# Patient Record
Sex: MALE | Race: WHITE | Employment: OTHER | URBAN - METROPOLITAN AREA
[De-identification: names, ages, dates, MRNs, and addresses within clinical notes are randomized per-mention and may not be internally consistent; named-entity substitution may affect disease eponyms.]

---

## 2017-06-29 PROBLEM — I72.3 ILIAC ARTERY ANEURYSM, BILATERAL (HCC): Status: ACTIVE | Noted: 2017-06-29

## 2017-07-27 PROBLEM — Z87.891 HISTORY OF TOBACCO USE: Status: ACTIVE | Noted: 2017-07-27

## 2019-06-25 ENCOUNTER — HOSPITAL ENCOUNTER (OUTPATIENT)
Age: 74
Discharge: HOME | End: 2019-06-25
Payer: MEDICARE

## 2019-06-25 LAB
ALBUMIN SERPL-MCNC: 4.1 G/DL (ref 3.5–5.2)
ALP BLD-CCNC: 73 U/L (ref 40–129)
ALT SERPL-CCNC: 20 U/L (ref 0–40)
ANION GAP SERPL CALCULATED.3IONS-SCNC: 12 MMOL/L (ref 7–16)
AST SERPL-CCNC: 25 U/L (ref 0–39)
BASOPHILS ABSOLUTE: 0.07 E9/L (ref 0–0.2)
BASOPHILS RELATIVE PERCENT: 0.8 % (ref 0–2)
BILIRUB SERPL-MCNC: 0.3 MG/DL (ref 0–1.2)
BUN BLDV-MCNC: 37 MG/DL (ref 8–23)
CALCIUM SERPL-MCNC: 9 MG/DL (ref 8.6–10.2)
CHLORIDE BLD-SCNC: 107 MMOL/L (ref 98–107)
CHOLESTEROL, FASTING: 167 MG/DL (ref 0–199)
CO2: 25 MMOL/L (ref 22–29)
CREAT SERPL-MCNC: 2.4 MG/DL (ref 0.7–1.2)
EOSINOPHILS ABSOLUTE: 0.24 E9/L (ref 0.05–0.5)
EOSINOPHILS RELATIVE PERCENT: 2.7 % (ref 0–6)
GFR AFRICAN AMERICAN: 32
GFR NON-AFRICAN AMERICAN: 26 ML/MIN/1.73
GLUCOSE FASTING: 104 MG/DL (ref 74–99)
HCT VFR BLD CALC: 44.1 % (ref 37–54)
HDLC SERPL-MCNC: 45 MG/DL
HEMOGLOBIN: 14.1 G/DL (ref 12.5–16.5)
IMMATURE GRANULOCYTES #: 0.04 E9/L
IMMATURE GRANULOCYTES %: 0.5 % (ref 0–5)
LDL CHOLESTEROL CALCULATED: 109 MG/DL (ref 0–99)
LYMPHOCYTES ABSOLUTE: 1.92 E9/L (ref 1.5–4)
LYMPHOCYTES RELATIVE PERCENT: 21.9 % (ref 20–42)
MCH RBC QN AUTO: 30.8 PG (ref 26–35)
MCHC RBC AUTO-ENTMCNC: 32 % (ref 32–34.5)
MCV RBC AUTO: 96.3 FL (ref 80–99.9)
MONOCYTES ABSOLUTE: 0.86 E9/L (ref 0.1–0.95)
MONOCYTES RELATIVE PERCENT: 9.8 % (ref 2–12)
NEUTROPHILS ABSOLUTE: 5.62 E9/L (ref 1.8–7.3)
NEUTROPHILS RELATIVE PERCENT: 64.3 % (ref 43–80)
PDW BLD-RTO: 14.6 FL (ref 11.5–15)
PLATELET # BLD: 197 E9/L (ref 130–450)
PMV BLD AUTO: 10.9 FL (ref 7–12)
POTASSIUM SERPL-SCNC: 5.1 MMOL/L (ref 3.5–5)
PROSTATE SPECIFIC ANTIGEN: 2.37 NG/ML (ref 0–4)
RBC # BLD: 4.58 E12/L (ref 3.8–5.8)
SODIUM BLD-SCNC: 144 MMOL/L (ref 132–146)
TOTAL PROTEIN: 7.1 G/DL (ref 6.4–8.3)
TRIGLYCERIDE, FASTING: 67 MG/DL (ref 0–149)
VLDLC SERPL CALC-MCNC: 13 MG/DL
WBC # BLD: 8.8 E9/L (ref 4.5–11.5)

## 2019-06-25 PROCEDURE — 83695 ASSAY OF LIPOPROTEIN(A): CPT

## 2019-06-25 PROCEDURE — 80061 LIPID PANEL: CPT

## 2019-06-25 PROCEDURE — 85025 COMPLETE CBC W/AUTO DIFF WBC: CPT

## 2019-06-25 PROCEDURE — 80053 COMPREHEN METABOLIC PANEL: CPT

## 2019-06-25 PROCEDURE — G0103 PSA SCREENING: HCPCS

## 2019-06-25 PROCEDURE — 36415 COLL VENOUS BLD VENIPUNCTURE: CPT

## 2019-06-27 LAB — LIPOPROTEIN (A): 20 MG/DL

## 2020-03-13 ENCOUNTER — HOSPITAL ENCOUNTER (OUTPATIENT)
Age: 75
Discharge: HOME | End: 2020-03-13
Payer: MEDICARE

## 2020-03-13 LAB
ALBUMIN SERPL-MCNC: 3.9 G/DL (ref 3.5–5.2)
ALP BLD-CCNC: 53 U/L (ref 40–129)
ALT SERPL-CCNC: 12 U/L (ref 0–40)
ANION GAP SERPL CALCULATED.3IONS-SCNC: 12 MMOL/L (ref 7–16)
AST SERPL-CCNC: 19 U/L (ref 0–39)
BACTERIA: ABNORMAL /HPF
BILIRUB SERPL-MCNC: 0.3 MG/DL (ref 0–1.2)
BILIRUBIN URINE: NEGATIVE
BLOOD, URINE: ABNORMAL
BUN BLDV-MCNC: 47 MG/DL (ref 8–23)
CALCIUM SERPL-MCNC: 8.7 MG/DL (ref 8.6–10.2)
CHLORIDE BLD-SCNC: 106 MMOL/L (ref 98–107)
CHOLESTEROL, FASTING: 167 MG/DL (ref 0–199)
CLARITY: CLEAR
CO2: 24 MMOL/L (ref 22–29)
COLOR: YELLOW
CREAT SERPL-MCNC: 2.8 MG/DL (ref 0.7–1.2)
CREATININE URINE: 95 MG/DL (ref 40–278)
GFR AFRICAN AMERICAN: 27
GFR NON-AFRICAN AMERICAN: 22 ML/MIN/1.73
GLUCOSE BLD-MCNC: 96 MG/DL (ref 74–99)
GLUCOSE URINE: NEGATIVE MG/DL
HBA1C MFR BLD: 5.6 % (ref 4–5.6)
HCT VFR BLD CALC: 40.1 % (ref 37–54)
HDLC SERPL-MCNC: 56 MG/DL
HEMOGLOBIN: 12.8 G/DL (ref 12.5–16.5)
KETONES, URINE: NEGATIVE MG/DL
LDL CHOLESTEROL CALCULATED: 100 MG/DL (ref 0–99)
LEUKOCYTE ESTERASE, URINE: NEGATIVE
MAGNESIUM: 2.5 MG/DL (ref 1.6–2.6)
MCH RBC QN AUTO: 31.3 PG (ref 26–35)
MCHC RBC AUTO-ENTMCNC: 31.9 % (ref 32–34.5)
MCV RBC AUTO: 98 FL (ref 80–99.9)
MICROALBUMIN UR-MCNC: 1410 MG/L
MICROALBUMIN/CREAT UR-RTO: 1484.2 (ref 0–30)
NITRITE, URINE: NEGATIVE
PARATHYROID HORMONE INTACT: 71 PG/ML (ref 15–65)
PDW BLD-RTO: 13.9 FL (ref 11.5–15)
PH UA: 5.5 (ref 5–9)
PHOSPHORUS: 5.1 MG/DL (ref 2.5–4.5)
PLATELET # BLD: 219 E9/L (ref 130–450)
PMV BLD AUTO: 11 FL (ref 7–12)
POTASSIUM SERPL-SCNC: 4.9 MMOL/L (ref 3.5–5)
PROSTATE SPECIFIC ANTIGEN: 2.42 NG/ML (ref 0–4)
PROTEIN UA: 100 MG/DL
RBC # BLD: 4.09 E12/L (ref 3.8–5.8)
RBC UA: ABNORMAL /HPF (ref 0–2)
SODIUM BLD-SCNC: 142 MMOL/L (ref 132–146)
SPECIFIC GRAVITY UA: >=1.03 (ref 1–1.03)
TOTAL PROTEIN: 6.3 G/DL (ref 6.4–8.3)
TRIGLYCERIDE, FASTING: 56 MG/DL (ref 0–149)
TSH SERPL DL<=0.05 MIU/L-ACNC: 3.22 UIU/ML (ref 0.27–4.2)
URIC ACID, SERUM: 4.1 MG/DL (ref 3.4–7)
UROBILINOGEN, URINE: 0.2 E.U./DL
VITAMIN D 25-HYDROXY: 35 NG/ML (ref 30–100)
VLDLC SERPL CALC-MCNC: 11 MG/DL
WBC # BLD: 7.7 E9/L (ref 4.5–11.5)
WBC UA: ABNORMAL /HPF (ref 0–5)

## 2020-03-13 PROCEDURE — 82570 ASSAY OF URINE CREATININE: CPT

## 2020-03-13 PROCEDURE — 83695 ASSAY OF LIPOPROTEIN(A): CPT

## 2020-03-13 PROCEDURE — 80053 COMPREHEN METABOLIC PANEL: CPT

## 2020-03-13 PROCEDURE — G0103 PSA SCREENING: HCPCS

## 2020-03-13 PROCEDURE — 84100 ASSAY OF PHOSPHORUS: CPT

## 2020-03-13 PROCEDURE — 80061 LIPID PANEL: CPT

## 2020-03-13 PROCEDURE — 84443 ASSAY THYROID STIM HORMONE: CPT

## 2020-03-13 PROCEDURE — 83735 ASSAY OF MAGNESIUM: CPT

## 2020-03-13 PROCEDURE — 83036 HEMOGLOBIN GLYCOSYLATED A1C: CPT

## 2020-03-13 PROCEDURE — 82306 VITAMIN D 25 HYDROXY: CPT

## 2020-03-13 PROCEDURE — 82044 UR ALBUMIN SEMIQUANTITATIVE: CPT

## 2020-03-13 PROCEDURE — 81001 URINALYSIS AUTO W/SCOPE: CPT

## 2020-03-13 PROCEDURE — 84550 ASSAY OF BLOOD/URIC ACID: CPT

## 2020-03-13 PROCEDURE — 85027 COMPLETE CBC AUTOMATED: CPT

## 2020-03-13 PROCEDURE — 36415 COLL VENOUS BLD VENIPUNCTURE: CPT

## 2020-03-13 PROCEDURE — 83970 ASSAY OF PARATHORMONE: CPT

## 2020-03-15 LAB — LIPOPROTEIN (A): 28 MG/DL

## 2021-05-11 ENCOUNTER — HOSPITAL ENCOUNTER (OUTPATIENT)
Age: 76
Discharge: HOME | End: 2021-05-11
Payer: MEDICARE

## 2021-05-11 LAB
ALBUMIN SERPL-MCNC: 3.9 G/DL (ref 3.5–5.2)
ALP BLD-CCNC: 56 U/L (ref 40–129)
ALT SERPL-CCNC: 14 U/L (ref 0–40)
ANION GAP SERPL CALCULATED.3IONS-SCNC: 13 MMOL/L (ref 7–16)
AST SERPL-CCNC: 21 U/L (ref 0–39)
BILIRUB SERPL-MCNC: 0.3 MG/DL (ref 0–1.2)
BUN BLDV-MCNC: 62 MG/DL (ref 6–23)
CALCIUM SERPL-MCNC: 8.5 MG/DL (ref 8.6–10.2)
CHLORIDE BLD-SCNC: 106 MMOL/L (ref 98–107)
CHOLESTEROL, FASTING: 183 MG/DL (ref 0–199)
CO2: 23 MMOL/L (ref 22–29)
CREAT SERPL-MCNC: 3.1 MG/DL (ref 0.7–1.2)
GFR AFRICAN AMERICAN: 24
GFR NON-AFRICAN AMERICAN: 20 ML/MIN/1.73
GLUCOSE BLD-MCNC: 95 MG/DL (ref 74–99)
HCT VFR BLD CALC: 39.3 % (ref 37–54)
HDLC SERPL-MCNC: 58 MG/DL
HEMOGLOBIN: 12.3 G/DL (ref 12.5–16.5)
LDL CHOLESTEROL CALCULATED: 111 MG/DL (ref 0–99)
MCH RBC QN AUTO: 30.4 PG (ref 26–35)
MCHC RBC AUTO-ENTMCNC: 31.3 % (ref 32–34.5)
MCV RBC AUTO: 97.3 FL (ref 80–99.9)
PDW BLD-RTO: 13.5 FL (ref 11.5–15)
PLATELET # BLD: 231 E9/L (ref 130–450)
PMV BLD AUTO: 9.8 FL (ref 7–12)
POTASSIUM SERPL-SCNC: 5.2 MMOL/L (ref 3.5–5)
PROSTATE SPECIFIC ANTIGEN: 2.68 NG/ML (ref 0–4)
RBC # BLD: 4.04 E12/L (ref 3.8–5.8)
SODIUM BLD-SCNC: 142 MMOL/L (ref 132–146)
TOTAL PROTEIN: 6.7 G/DL (ref 6.4–8.3)
TRIGLYCERIDE, FASTING: 72 MG/DL (ref 0–149)
VLDLC SERPL CALC-MCNC: 14 MG/DL
WBC # BLD: 9 E9/L (ref 4.5–11.5)

## 2021-05-11 PROCEDURE — 80053 COMPREHEN METABOLIC PANEL: CPT

## 2021-05-11 PROCEDURE — 85027 COMPLETE CBC AUTOMATED: CPT

## 2021-05-11 PROCEDURE — 36415 COLL VENOUS BLD VENIPUNCTURE: CPT

## 2021-05-11 PROCEDURE — 80061 LIPID PANEL: CPT

## 2021-05-11 PROCEDURE — 84153 ASSAY OF PSA TOTAL: CPT

## 2021-05-11 PROCEDURE — 83695 ASSAY OF LIPOPROTEIN(A): CPT

## 2021-05-14 LAB — LIPOPROTEIN (A): 27 MG/DL

## 2022-02-10 ENCOUNTER — HOSPITAL ENCOUNTER (OUTPATIENT)
Dept: ULTRASOUND IMAGING | Age: 77
Discharge: HOME | End: 2022-02-12
Payer: MEDICARE

## 2022-02-10 DIAGNOSIS — E78.2 MIXED HYPERLIPIDEMIA: ICD-10-CM

## 2022-02-10 DIAGNOSIS — N18.4 CHRONIC KIDNEY DISEASE (CKD), STAGE IV (SEVERE) (HCC): ICD-10-CM

## 2022-02-10 DIAGNOSIS — I10 ESSENTIAL HYPERTENSION: ICD-10-CM

## 2022-02-10 DIAGNOSIS — I48.0 PAROXYSMAL ATRIAL FIBRILLATION (HCC): ICD-10-CM

## 2022-02-10 PROCEDURE — 76770 US EXAM ABDO BACK WALL COMP: CPT

## 2022-02-10 PROCEDURE — 93975 VASCULAR STUDY: CPT

## 2022-02-16 ENCOUNTER — NURSE ONLY (OUTPATIENT)
Dept: CARDIOLOGY CLINIC | Age: 77
End: 2022-02-16

## 2022-02-16 ENCOUNTER — OFFICE VISIT (OUTPATIENT)
Dept: CARDIOLOGY CLINIC | Age: 77
End: 2022-02-16
Payer: MEDICARE

## 2022-02-16 VITALS
BODY MASS INDEX: 25.18 KG/M2 | OXYGEN SATURATION: 98 % | WEIGHT: 170 LBS | RESPIRATION RATE: 20 BRPM | HEART RATE: 44 BPM | DIASTOLIC BLOOD PRESSURE: 64 MMHG | HEIGHT: 69 IN | SYSTOLIC BLOOD PRESSURE: 150 MMHG

## 2022-02-16 DIAGNOSIS — R00.1 BRADYCARDIA: Primary | ICD-10-CM

## 2022-02-16 PROCEDURE — 99203 OFFICE O/P NEW LOW 30 MIN: CPT | Performed by: INTERNAL MEDICINE

## 2022-02-16 PROCEDURE — 93000 ELECTROCARDIOGRAM COMPLETE: CPT | Performed by: INTERNAL MEDICINE

## 2022-02-16 RX ORDER — TAMSULOSIN HYDROCHLORIDE 0.4 MG/1
0.4 CAPSULE ORAL DAILY
COMMUNITY

## 2022-02-16 ASSESSMENT — ENCOUNTER SYMPTOMS
BACK PAIN: 0
VOMITING: 0
COUGH: 0
BLOOD IN STOOL: 0
CONSTIPATION: 0
SHORTNESS OF BREATH: 0
NAUSEA: 0
DIARRHEA: 0
ABDOMINAL PAIN: 0
WHEEZING: 0

## 2022-02-16 NOTE — PROGRESS NOTES
OUTPATIENT CARDIOLOGY CONSULT    Name: Rica Hogan    Age: 64 y.o. Date of Service: 2/16/2022    Reason for Consultation:   Chief Complaint   Patient presents with    New Patient     Referral of Dr. Willis Malloy for bradycardia. Per wife, has had longstanding bradycardia and has always been asymptomatic. Referring Physician: Freddie Haddad MD    History of Present Illness:  78-year-old ex-smoker male who is referred for cardiac evaluation due to bradycardia. Patient is accompanied by his wife. He has longstanding history of bradycardia, hypertension, gout, peripheral arterial disease, paroxysmal atrial fibrillation during anesthesia prior to abdominal aortic aneurysm stenting at [facility] in 2017, status post bilateral bypass surgery to the lower extremity, syncopal event 3 years ago attributed to dehydration, chronic kidney disease and DVT. Patient is fairly active. He exercising at the  once or twice a week. He walks 2 to 3 miles on treadmill and bicycle and lifting. He denies chest discomfort or dyspnea on exertion. He denies palpitations, dizziness or syncope. He denies orthopnea or PND but admits to have chronic lower extremity edema. EKG done today revealed probable atrial fibrillation with slow ventricular response at 44 bpm, low voltage in frontal leads with nonspecific T wave changes, artifacts and prolonged QTC at 482 ms. Review of Systems:  Review of Systems   Constitutional: Negative for fatigue and fever. HENT: Negative for nosebleeds. Respiratory: Negative for cough, shortness of breath and wheezing. Cardiovascular: Positive for leg swelling. Gastrointestinal: Negative for abdominal pain, blood in stool, constipation, diarrhea, nausea and vomiting. Genitourinary: Negative for dysuria and hematuria. Musculoskeletal: Negative for back pain, joint swelling and myalgias. Neurological: Negative for syncope and light-headedness.    Psychiatric/Behavioral: The patient is not nervous/anxious. Past Medical History:  Past Medical History:   Diagnosis Date    Aneurysm of abdominal aorta ([facility] [place] [address] 2012    Deep vein thrombosis of calf (HCC)     R calf vein thrombosis involving post tibial vein with extension in close proximity  to popliteal vein    Gout     History of DVT (deep vein thrombosis) 3/12/2015    History of tobacco use 2017    Hypertension     Iliac artery aneurysm, bilateral ([facility] [place] [address] 2017    Loss of weight     diet and exercise    [email or web address] 3/12/2015       Past Surgical History:  Past Surgical History:   Procedure Laterality Date    ABDOMINAL AORTIC ANEURYSM REPAIR      2018    HERNIA REPAIR      OPEN REPAIR PERIPHERAL ANEURYSM Bilateral 2018    TONSILLECTOMY         Family History:  No family history on file. Social History:  Social History     Socioeconomic History    Marital status:      Spouse name: Not on file    Number of children: Not on file    Years of education: Not on file    Highest education level: Not on file   Occupational History    Not on file   Tobacco Use    Smoking status: Former Smoker     Packs/day: 2.00     Quit date: 3/12/1995     Years since quittin.9    Smokeless tobacco: Never Used   Substance and Sexual Activity    Alcohol use: Yes     Comment: socially    Drug use: Never    Sexual activity: Not on file   Other Topics Concern    Not on file   Social History Narrative    1 -2 cup coffee, 1 tea at night     Social Determinants of Health     Financial Resource Strain:     Difficulty of Paying Living Expenses: Not on file   Food Insecurity:     Worried About Running Out of Food in the Last Year: Not on file    Tate of Food in the Last Year: Not on file   Transportation Needs:     Lack of Transportation (Medical): Not on file    Lack of Transportation (Non-Medical):  Not on file   Physical Activity:     Days of Exercise per Week: Not on file    Minutes of Exercise per Session: Not on file   Stress:     Feeling of Stress : Not on file   Social Connections:     Frequency of Communication with Friends and Family: Not on file    Frequency of Social Gatherings with Friends and Family: Not on file    Attends Sabianism Services: Not on file    Active Member of Clubs or Organizations: Not on file    Attends Club or Organization Meetings: Not on file    Marital Status: Not on file   Intimate Partner Violence:     Fear of Current or Ex-Partner: Not on file    Emotionally Abused: Not on file    Physically Abused: Not on file    Sexually Abused: Not on file   Housing Stability:     Unable to Pay for Housing in the Last Year: Not on file    Number of [place] in the Last Year: Not on file    Unstable Housing in the Last Year: Not on file       Allergies: Allergies   Allergen Reactions    Penicillins     Seasonal        Current Medications:  Current Outpatient Medications   Medication Sig Dispense Refill    Apoaequorin (PREVAGEN PO) Take 1 capsule by mouth      apixaban (ELIQUIS) 2.5 MG TABS tablet Take 2.5 mg by mouth 2 times daily      tamsulosin (FLOMAX) 0.4 MG capsule Take 0.4 mg by mouth daily      Rosuvastatin Calcium 10 MG CPSP Take 1 capsule by mouth at bedtime      Probiotic Product (PROBIOTIC BLEND PO) Take by mouth      allopurinol (ZYLOPRIM) 100 MG tablet Take 100 mg by mouth daily       amLODIPine (NORVASC) 5 MG tablet Take 5 mg by mouth daily       aliskiren (TEKTURNA) 150 MG tablet Take 150 mg by mouth daily.  Cholecalciferol (VITAMIN D) 2000 UNITS CAPS capsule Take  by mouth daily. No current facility-administered medications for this visit.        Physical Exam:  BP (!) 160/62 (Site: Left Upper Arm, Position: Sitting, Cuff Size: Medium Adult)   Pulse (!) 44   Resp 20   Ht 5' 9\" (1.753 m)   Wt 170 lb (77.1 kg)   SpO2 98%   BMI 25.10 kg/m²   Wt Readings from Last 3 Encounters:   02/16/22 170 lb (77.1 kg)   10/14/17 191 lb (86.6 kg) Physical Exam:  BP (!) 160/62 (Site: Left Upper Arm, Position: Sitting, Cuff Size: Medium Adult)   Pulse (!) 44   Resp 20   Ht 5' 9\" (1.753 m)   Wt 170 lb (77.1 kg)   SpO2 98%   BMI 25.10 kg/m²   Wt Readings from Last 3 Encounters:   02/16/22 170 lb (77.1 kg)   10/14/17 191 lb (86.6 kg)     Physical Exam  Constitutional:       General: He is not in acute distress. Appearance: He is well-developed. HENT:      Head: Normocephalic and atraumatic. Neck:      Vascular: Carotid bruit present. No JVD. Comments: Soft bilateral carotid bruit versus radiating heart murmur. Cardiovascular:      Rate and Rhythm: Bradycardia present. Rhythm irregular. Heart sounds: No murmur heard. No friction rub. No gallop. Comments: 1/6 systolic murmur best heard at second intercostal space with decreased S1 and S2 intensity. Pulmonary:      Breath sounds: Normal breath sounds. No wheezing or rales. Chest:      Chest wall: No tenderness. Abdominal:      General: Bowel sounds are normal. There is no distension. Palpations: Abdomen is soft. There is no mass. Tenderness: There is no abdominal tenderness. Comments: No abdominal bruit. Musculoskeletal:      Cervical back: Neck supple. Right lower leg: Edema present. Left lower leg: Edema present. Comments: 2+ bilateral pitting leg edema. Skin:     General: Skin is warm and dry. Neurological:      Mental Status: He is alert and oriented to person, place, and time.           Laboratory Tests:  Lab Results   Component Value Date    CREATININE 3.1 (H) 05/11/2021    BUN 62 (H) 05/11/2021     05/11/2021    K 5.2 (H) 05/11/2021     05/11/2021    CO2 23 05/11/2021     Lab Results   Component Value Date    MG 2.5 03/13/2020     Lab Results   Component Value Date    WBC 9.0 05/11/2021    HGB 12.3 (L) 05/11/2021    HCT 39.3 05/11/2021    MCV 97.3 05/11/2021     05/11/2021     Lab Results   Component Value Date ALT 14 05/11/2021    AST 21 05/11/2021    ALKPHOS 56 05/11/2021    BILITOT 0.3 05/11/2021     Lab Results   Component Value Date    TROPONINI 0.01 10/14/2017     Lab Results   Component Value Date    INR 1.1 10/14/2017    PROTIME 12.0 10/14/2017     Lab Results   Component Value Date    TSH 3.220 03/13/2020     Lab Results   Component Value Date    LABA1C 5.6 03/13/2020     No results found for: EAG  Lab Results   Component Value Date    CHOL 188 07/17/2017    CHOL 189 09/08/2016    CHOL 194 06/27/2015     Lab Results   Component Value Date    TRIG 182 (H) 07/17/2017    TRIG 88 09/08/2016    TRIG 80 06/27/2015     Lab Results   Component Value Date    HDL 58 05/11/2021    HDL 56 03/13/2020    HDL 45 06/25/2019     Lab Results   Component Value Date    LDLCALC 111 (H) 05/11/2021    LDLCALC 100 (H) 03/13/2020    LDLCALC 109 (H) 06/25/2019     Lab Results   Component Value Date    LABVLDL 14 05/11/2021    LABVLDL 11 03/13/2020    LABVLDL 13 06/25/2019           ASSESSMENT / PLAN:  -Probable atrial fibrillation with slow ventricular response due to sick sinus syndrome. Patient is asymptomatic with no dizziness or syncope. -Hypertension: Mildly elevated but could be exacerbated by whitecoat hypertension.  -Chronic kidney disease.  -Lower extremity edema: Probably due to amlodipine use. -Status post stenting to abdominal aortic aneurysm and status post bypass surgery of both lower extremities.  -History of DVT. Will continue current cardiac meds. Will arrange for the patient to have a 48 hours Holter to assess his average heart rate and rule out significant pauses. Will arrange for the patient to have a carotid ultrasound to rule out significant carotid disease. Will arrange for the patient to have an echocardiogram to assess the etiology of his systolic murmur and assess his left and right ventricular systolic function.   We will arrange for the patient to have a modified Damir protocol with nuclear imaging to rule out significant myocardial ischemia. Will refer patient for EP consultation due to probable A. fib with sick sinus syndrome. We will follow up in my office in 6 months. Thank you for allowing me to participate in your patient's care. Please feel free to contact me if you have any questions or concerns.     Drew Cameron MD [facility] [place] [address] [facility]

## 2022-02-21 ENCOUNTER — HOSPITAL ENCOUNTER (OUTPATIENT)
Age: 77
Discharge: HOME | End: 2022-02-21
Payer: MEDICARE

## 2022-02-21 LAB
ALBUMIN SERPL-MCNC: 4.1 G/DL (ref 3.5–5.2)
ALP BLD-CCNC: 50 U/L (ref 40–129)
ALT SERPL-CCNC: 17 U/L (ref 0–40)
ANION GAP SERPL CALCULATED.3IONS-SCNC: 13 MMOL/L (ref 7–16)
AST SERPL-CCNC: 23 U/L (ref 0–39)
BASOPHILS ABSOLUTE: 0.05 E9/L (ref 0–0.2)
BASOPHILS RELATIVE PERCENT: 0.7 % (ref 0–2)
BILIRUB SERPL-MCNC: 0.4 MG/DL (ref 0–1.2)
BUN BLDV-MCNC: 54 MG/DL (ref 6–23)
CALCIUM SERPL-MCNC: 8.7 MG/DL (ref 8.6–10.2)
CHLORIDE BLD-SCNC: 106 MMOL/L (ref 98–107)
CHOLESTEROL, TOTAL: 109 MG/DL (ref 0–199)
CO2: 19 MMOL/L (ref 22–29)
CREAT SERPL-MCNC: 3.8 MG/DL (ref 0.7–1.2)
CREATININE URINE: 75 MG/DL (ref 40–278)
CREATININE URINE: 76 MG/DL (ref 40–278)
EOSINOPHILS ABSOLUTE: 0.23 E9/L (ref 0.05–0.5)
EOSINOPHILS RELATIVE PERCENT: 3.2 % (ref 0–6)
GFR AFRICAN AMERICAN: 19
GFR NON-AFRICAN AMERICAN: 15 ML/MIN/1.73
GLUCOSE BLD-MCNC: 90 MG/DL (ref 74–99)
HBA1C MFR BLD: 4.8 % (ref 4–5.6)
HCT VFR BLD CALC: 32.9 % (ref 37–54)
HDLC SERPL-MCNC: 65 MG/DL
HEMOGLOBIN: 10.3 G/DL (ref 12.5–16.5)
IMMATURE GRANULOCYTES #: 0.03 E9/L
IMMATURE GRANULOCYTES %: 0.4 % (ref 0–5)
IRON SATURATION: 29 % (ref 20–55)
IRON: 59 MCG/DL (ref 59–158)
LDL CHOLESTEROL CALCULATED: 28 MG/DL (ref 0–99)
LYMPHOCYTES ABSOLUTE: 1.57 E9/L (ref 1.5–4)
LYMPHOCYTES RELATIVE PERCENT: 22.1 % (ref 20–42)
MAGNESIUM: 2.6 MG/DL (ref 1.6–2.6)
MCH RBC QN AUTO: 30.8 PG (ref 26–35)
MCHC RBC AUTO-ENTMCNC: 31.3 % (ref 32–34.5)
MCV RBC AUTO: 98.5 FL (ref 80–99.9)
MICROALBUMIN UR-MCNC: 965.9 MG/L
MICROALBUMIN/CREAT UR-RTO: 1287.9 (ref 0–30)
MONOCYTES ABSOLUTE: 0.64 E9/L (ref 0.1–0.95)
MONOCYTES RELATIVE PERCENT: 9 % (ref 2–12)
NEUTROPHILS ABSOLUTE: 4.58 E9/L (ref 1.8–7.3)
NEUTROPHILS RELATIVE PERCENT: 64.6 % (ref 43–80)
PARATHYROID HORMONE INTACT: 55 PG/ML (ref 15–65)
PDW BLD-RTO: 13.7 FL (ref 11.5–15)
PHOSPHORUS: 4.9 MG/DL (ref 2.5–4.5)
PLATELET # BLD: 189 E9/L (ref 130–450)
PMV BLD AUTO: 9.9 FL (ref 7–12)
POTASSIUM SERPL-SCNC: 5.1 MMOL/L (ref 3.5–5)
PROTEIN PROTEIN: 134 MG/DL (ref 0–12)
PROTEIN/CREAT RATIO: 1.8
PROTEIN/CREAT RATIO: 1.8 (ref 0–0.2)
RBC # BLD: 3.34 E12/L (ref 3.8–5.8)
SODIUM BLD-SCNC: 138 MMOL/L (ref 132–146)
TOTAL IRON BINDING CAPACITY: 207 MCG/DL (ref 250–450)
TOTAL PROTEIN: 6.6 G/DL (ref 6.4–8.3)
TRIGL SERPL-MCNC: 82 MG/DL (ref 0–149)
URIC ACID, SERUM: 6.2 MG/DL (ref 3.4–7)
VITAMIN D 25-HYDROXY: 60 NG/ML (ref 30–100)
VLDLC SERPL CALC-MCNC: 16 MG/DL
WBC # BLD: 7.1 E9/L (ref 4.5–11.5)

## 2022-02-21 PROCEDURE — 84156 ASSAY OF PROTEIN URINE: CPT

## 2022-02-21 PROCEDURE — 83540 ASSAY OF IRON: CPT

## 2022-02-21 PROCEDURE — 36415 COLL VENOUS BLD VENIPUNCTURE: CPT

## 2022-02-21 PROCEDURE — 83036 HEMOGLOBIN GLYCOSYLATED A1C: CPT

## 2022-02-21 PROCEDURE — 80053 COMPREHEN METABOLIC PANEL: CPT

## 2022-02-21 PROCEDURE — 85025 COMPLETE CBC W/AUTO DIFF WBC: CPT

## 2022-02-21 PROCEDURE — 82306 VITAMIN D 25 HYDROXY: CPT

## 2022-02-21 PROCEDURE — 83550 IRON BINDING TEST: CPT

## 2022-02-21 PROCEDURE — 84100 ASSAY OF PHOSPHORUS: CPT

## 2022-02-21 PROCEDURE — 82044 UR ALBUMIN SEMIQUANTITATIVE: CPT

## 2022-02-21 PROCEDURE — 83735 ASSAY OF MAGNESIUM: CPT

## 2022-02-21 PROCEDURE — 84550 ASSAY OF BLOOD/URIC ACID: CPT

## 2022-02-21 PROCEDURE — 80061 LIPID PANEL: CPT

## 2022-02-21 PROCEDURE — 82570 ASSAY OF URINE CREATININE: CPT

## 2022-02-21 PROCEDURE — 83970 ASSAY OF PARATHORMONE: CPT

## 2022-03-01 ENCOUNTER — TELEPHONE (OUTPATIENT)
Dept: ADMINISTRATIVE | Age: 77
End: 2022-03-01

## 2022-03-01 DIAGNOSIS — R00.1 BRADYCARDIA: ICD-10-CM

## 2022-03-01 NOTE — TELEPHONE ENCOUNTER
Please call patient on cell. He states that no one has explained anything to him about the stress test that is scheduled for 03/04. He would like someone to call him and explain the details. He said he called hospital and was told he is not on schedule for stress test.   He would just like Dr Destin Atkins office to call him and explain things.  Thanks

## 2022-03-02 ENCOUNTER — TELEPHONE (OUTPATIENT)
Dept: CARDIOLOGY | Age: 77
End: 2022-03-02

## 2022-03-02 NOTE — TELEPHONE ENCOUNTER
Spoke to [place]'s wife June on the phone. She was reminded of his 8:30am stress test at [facility]. Cardiology and where to report. She was instructed to remind him of NPO after MN except meds with water and to avoid caffeine products for 12 hours. Covid questions and protocol was reviewed with her. She verbalized an understanding and will let him know.

## 2022-03-04 ENCOUNTER — HOSPITAL ENCOUNTER (OUTPATIENT)
Dept: CARDIOLOGY | Age: 77
Discharge: HOME | End: 2022-03-04
Payer: MEDICARE

## 2022-03-04 VITALS
DIASTOLIC BLOOD PRESSURE: 70 MMHG | WEIGHT: 160 LBS | SYSTOLIC BLOOD PRESSURE: 134 MMHG | BODY MASS INDEX: 23.7 KG/M2 | OXYGEN SATURATION: 98 % | HEIGHT: 69 IN | HEART RATE: 48 BPM

## 2022-03-04 DIAGNOSIS — R94.31 ABNORMAL EKG: Primary | ICD-10-CM

## 2022-03-04 DIAGNOSIS — R00.1 BRADYCARDIA: ICD-10-CM

## 2022-03-04 LAB
LV EF: 75 %
LVEF MODALITY: NORMAL

## 2022-03-04 PROCEDURE — 93017 CV STRESS TEST TRACING ONLY: CPT

## 2022-03-04 PROCEDURE — 78452 HT MUSCLE IMAGE SPECT MULT: CPT | Performed by: INTERNAL MEDICINE

## 2022-03-04 PROCEDURE — 93016 CV STRESS TEST SUPVJ ONLY: CPT | Performed by: INTERNAL MEDICINE

## 2022-03-04 PROCEDURE — 78452 HT MUSCLE IMAGE SPECT MULT: CPT

## 2022-03-04 PROCEDURE — 93018 CV STRESS TEST I&R ONLY: CPT | Performed by: INTERNAL MEDICINE

## 2022-03-04 PROCEDURE — 2580000003 HC RX 258: Performed by: INTERNAL MEDICINE

## 2022-03-04 PROCEDURE — 3430000000 HC RX DIAGNOSTIC RADIOPHARMACEUTICAL: Performed by: INTERNAL MEDICINE

## 2022-03-04 PROCEDURE — A9502 TC99M TETROFOSMIN: HCPCS | Performed by: INTERNAL MEDICINE

## 2022-03-04 RX ORDER — SODIUM CHLORIDE 0.9 % (FLUSH) 0.9 %
10 SYRINGE (ML) INJECTION PRN
Status: DISCONTINUED | OUTPATIENT
Start: 2022-03-04 | End: 2022-03-05 | Stop reason: HOSPADM

## 2022-03-04 RX ORDER — FUROSEMIDE 40 MG/1
TABLET ORAL
COMMUNITY
Start: 2022-02-22 | End: 2022-05-10 | Stop reason: ALTCHOICE

## 2022-03-04 RX ADMIN — SODIUM CHLORIDE, PRESERVATIVE FREE 10 ML: 5 INJECTION INTRAVENOUS at 08:57

## 2022-03-04 RX ADMIN — SODIUM CHLORIDE, PRESERVATIVE FREE 10 ML: 5 INJECTION INTRAVENOUS at 10:25

## 2022-03-04 RX ADMIN — TETROFOSMIN 8.3 MILLICURIE: 0.23 INJECTION, POWDER, LYOPHILIZED, FOR SOLUTION INTRAVENOUS at 08:57

## 2022-03-04 RX ADMIN — TETROFOSMIN 27.5 MILLICURIE: 0.23 INJECTION, POWDER, LYOPHILIZED, FOR SOLUTION INTRAVENOUS at 10:25

## 2022-03-04 NOTE — PROCEDURES
[id number] [facility],[address] and Vascular Lab - [address]. WINNIE erickson, [address] [place] [address]  [phone]                  Exercise Stress Nuclear Gated SPECT Study    Name: Kali Chandler Account Number: [de-identified]    :  1945      Sex: male              Date of Study:  3/4/2022    Height: 5' 9\" (175.3 cm)  Weight: 160 lb (72.6 kg)     Ordering Provider: Federica Washburn MD          PCP: Marcela Apley, MD      Cardiologist: Federica Washburn MD                    Interpreting Physician: Federica Washburn MD _________________________________________________________________________________    Indication: Bradycardia, assess chronotropic response to exercise and rule out ischemia. Clinical History:   Patient has no known history of coronary artery disease. Resting ECG: Sinus rhythm at 64 bpm with nonspecific T wave changes. Exercise: The patient exercised on a modified Damir protocol but could not get his heart rate above 80 bpm.  The stress test was then switched to a Damir protocol, completing 8:01 minutes and reaching an estimated work load of 46.6 metabolic equivalents (METS). Resting HR was 64. Peak exercise heart rate was 117 ( 82% of maximum predicted heart rate for age). Baseline /62. Peak exercise /70. The blood pressure response to exercise was normal      Exercise was terminated due to dyspnea and leg fatigue. The patient experienced no chest pain with exercise. Pulse oximetry was used to monitor oxygen saturation during the stress test.  The study was performed on Room Air. The resting pulse oximeter was 98%. The post stress O2 saturation seen during exercise was 98 %. Exercise ECG:   The patient demonstrated occasional PVC's during exercise and recovery. EKG revealed no ischemia above his abnormal baseline.     IMAGING: Myocardial perfusion imaging was performed at rest 30-35 minutes following the intravenous injection of 8.3 mCi of (Tc-tetrofosmin) followed by 10 ml of Normal Saline. At peak exercise, the patient was injected intravenously with 27.5 mCi of (Tc-tetrofosmin) followed by 10 ml of Normal Saline. Gated post-stress tomographic imaging was performed 20-25 minutes after stress. FINDINGS: The overall quality of the study was good. Left ventricular cavity size was noted to be at upper limit of normal during exercise. TID 1.25     Rotational analog analysis demonstrated increased bowel uptake noted on both stress and resting imaging. The gated SPECT stress imaging in the short, vertical long, and horizontal long axis demonstrated moderate size inferior defect of mild to moderate intensity. This defect was even worse at rest.    Gated SPECT left ventricular ejection fraction was calculated to be 75%, with absence of segmental wall motion abnormalities. Impression:    -Nondiagnostic exercise stress test by EKG criteria. -Acceptable chronotropic response to exercise.  -Moderate size inferior defect worse at rest and probably due to increased bowel uptake.  -Absence of segmental wall motion abnormalities. -TID at upper limit of normal.  -Ejection fraction 75%. -Low risk exercise nuclear stress test.    Thank you for sending your patient to this [employer].      Electronically signed by Beatriz Sacks, MD on 3/4/2022 at 11:46 AM

## 2022-03-30 ENCOUNTER — HOSPITAL ENCOUNTER (EMERGENCY)
Age: 77
Discharge: HOME | End: 2022-03-30
Attending: EMERGENCY MEDICINE
Payer: MEDICARE

## 2022-03-30 VITALS
RESPIRATION RATE: 16 BRPM | HEART RATE: 60 BPM | SYSTOLIC BLOOD PRESSURE: 150 MMHG | TEMPERATURE: 98.5 F | DIASTOLIC BLOOD PRESSURE: 64 MMHG | OXYGEN SATURATION: 97 %

## 2022-03-30 DIAGNOSIS — S81.811A LACERATION OF RIGHT LOWER EXTREMITY, INITIAL ENCOUNTER: Primary | ICD-10-CM

## 2022-03-30 PROCEDURE — 90471 IMMUNIZATION ADMIN: CPT | Performed by: EMERGENCY MEDICINE

## 2022-03-30 PROCEDURE — 6370000000 HC RX 637 (ALT 250 FOR IP): Performed by: EMERGENCY MEDICINE

## 2022-03-30 PROCEDURE — 90714 TD VACC NO PRESV 7 YRS+ IM: CPT | Performed by: EMERGENCY MEDICINE

## 2022-03-30 PROCEDURE — 6360000002 HC RX W HCPCS: Performed by: EMERGENCY MEDICINE

## 2022-03-30 PROCEDURE — 99283 EMERGENCY DEPT VISIT LOW MDM: CPT

## 2022-03-30 RX ORDER — DOXYCYCLINE HYCLATE 100 MG
100 TABLET ORAL 2 TIMES DAILY
Qty: 20 TABLET | Refills: 0 | Status: SHIPPED | OUTPATIENT
Start: 2022-03-30 | End: 2022-04-09

## 2022-03-30 RX ORDER — DOXYCYCLINE HYCLATE 100 MG/1
100 CAPSULE ORAL ONCE
Status: COMPLETED | OUTPATIENT
Start: 2022-03-30 | End: 2022-03-30

## 2022-03-30 RX ORDER — TETANUS AND DIPHTHERIA TOXOIDS ADSORBED 2; 2 [LF]/.5ML; [LF]/.5ML
0.5 INJECTION INTRAMUSCULAR ONCE
Status: COMPLETED | OUTPATIENT
Start: 2022-03-30 | End: 2022-03-30

## 2022-03-30 RX ORDER — LIDOCAINE HYDROCHLORIDE AND EPINEPHRINE 10; 10 MG/ML; UG/ML
INJECTION, SOLUTION INFILTRATION; PERINEURAL
Status: DISCONTINUED
Start: 2022-03-30 | End: 2022-03-30 | Stop reason: HOSPADM

## 2022-03-30 RX ADMIN — DOXYCYCLINE HYCLATE 100 MG: 100 CAPSULE ORAL at 19:05

## 2022-03-30 RX ADMIN — TETANUS AND DIPHTHERIA TOXOIDS ADSORBED 0.5 ML: 2; 2 INJECTION INTRAMUSCULAR at 19:05

## 2022-04-02 ASSESSMENT — ENCOUNTER SYMPTOMS
EYE REDNESS: 0
NAUSEA: 0
VOMITING: 0
SHORTNESS OF BREATH: 0
ABDOMINAL PAIN: 0

## 2022-04-02 NOTE — ED PROVIDER NOTES
Chief complaint: Laceration      HPI:  4/2/22, Time: 4:56 PM EDT    HPI               Jarred Lewis is a 64 y.o. male presenting to the ED for laceration. The history is obtained from the patient as well as patient's medical record. Patient is presenting emergency department the chief complaint laceration. He states that just prior to arrival he was walking and struck his right foot on a tree branch on the ground which caused a laceration. The patient denies any pain. Laceration is moderate in severity. Nothing made it better. Worsened by the tree branch. He does not recall the date of his last tetanus immunization. He did have a bandage placed by EMS. He denies any other injuries. ROS:   Review of Systems   Constitutional: Negative for chills and fever. HENT: Negative for congestion. Eyes: Negative for redness. Respiratory: Negative for shortness of breath. Cardiovascular: Negative for chest pain. Gastrointestinal: Negative for abdominal pain, nausea and vomiting. Genitourinary: Negative for dysuria. Musculoskeletal: Negative for arthralgias. Skin: Positive for wound. Negative for rash. Neurological: Negative for light-headedness. Psychiatric/Behavioral: Negative for confusion. All other systems reviewed and are negative.      --------------------------------------------- PAST HISTORY ---------------------------------------------  Past Medical History:  has a past medical history of Aneurysm of abdominal aorta (HCC), Deep vein thrombosis of calf ([address] Gout, History of DVT (deep vein thrombosis), History of tobacco use, Hypertension, Iliac artery aneurysm, bilateral ([address] Loss of weight, and Right leg DVT ([facility] [address] Past Surgical History:  has a past surgical history that includes hernia repair; Tonsillectomy; Abdominal aortic aneurysm repair; and open thoracic aortic aneurysm repair (Bilateral, 2018). Social History:  reports that he quit smoking about 27 years ago.  He smoked 2.00 packs per day. He has never used smokeless tobacco. He reports current alcohol use. He reports that he does not use drugs. Family History: family history is not on file. The patients home medications have been reviewed. Allergies: Penicillins and Seasonal    ---------------------------------------------------PHYSICAL EXAM--------------------------------------      Constitutional/General: Alert and oriented x3, well appearing, non toxic in NAD  Head: Normocephalic and atraumatic  Mouth: Oropharynx clear, handling secretions, no trismus  Neck: Supple, full ROM,  Pulmonary: Lungs clear to auscultation bilaterally, no wheezes, rales, or rhonchi. Not in respiratory distress  Cardiovascular:  Regular rate. Regular rhythm. No murmurs  Chest: no chest wall tenderness  Abdomen: Soft. Non tender. Non distended. No rebound, guarding, or rigidity. No pulsatile masses appreciated. Musculoskeletal: Moves all extremities x 4. Warm and well perfused, no clubbing, cyanosis, or edema. Capillary refill <3 seconds, patient does have a 9 cm see crescent-shaped laceration on his right anterior shin, no foreign body seen, there is 2+ dorsalis pedis and posterior tibial pulses present. Bleeding currently controlled. Skin: warm and dry. No rashes. 9 cm crescent-shaped laceration on his right anterior shin, no foreign body seen, there is 2+ dorsalis pedis and posterior tibial pulses present. Bleeding currently controlled. Neurologic: GCS 15, no gross focal neurologic deficits  Psych: Normal Affect    -------------------------------------------------- RESULTS -------------------------------------------------  I have personally reviewed all laboratory and imaging results for this patient. Results are listed below. LABS:  No results found for this visit on 03/30/22.     RADIOLOGY:  Interpreted by Radiologist.  No orders to display       ------------------------- NURSING NOTES AND VITALS REVIEWED ---------------------------   The nursing notes within the ED encounter and vital signs as below have been reviewed by myself. BP (!) 150/64   Pulse 60   Temp 98.5 °F (36.9 °C) (Oral)   Resp 16   SpO2 97%   Oxygen Saturation Interpretation: Normal    The patients available past medical records and past encounters were reviewed. ------------------------------ ED COURSE/MEDICAL DECISION MAKING----------------------  Medications   diptheria-tetanus toxoids [facility]) 2-2 LF/0.5ML injection 0.5 mL (0.5 mLs IntraMUSCular Given 3/30/22 1905)   doxycycline hyclate (VIBRAMYCIN) capsule 100 mg (100 mg Oral Given 3/30/22 1905)       Laceration Repair Procedure Note    Indication: Laceration    Procedure: The patient was placed in the appropriate position and anesthesia around the laceration was obtained by infiltration using 6.0 cc of 1% Lidocaine with epinephrine. The area was then irrigated with normal saline. The laceration was closed with 4-0 Ethilon using interrupted sutures. There were no additional lacerations requiring repair. The wound area was then dressed with a sterile dressing. Minimal debridement was preformed, flaps were aligned. No foreign body was identified. Total repaired wound length: 9 cm. Other Items: Suture count: 13    The patient tolerated the procedure well. Complications: None          Medical Decision Making:   I, Dr. Krupa Holland am the primary physician of record. Damian Avery is a 64 y.o. male who presents to the ED for laceration. The patient did present with laceration from a tree. His tetanus was updated. The patient did have his laceration irrigated and repaired in the emergency department. As this was a large gaping and dirty wound the patient will be discharged on antibiotics he will follow-up outpatient. Re-Evaluations/Consultations:              Patient in the bed no acute distress. Laceration was repaired.   He will be discharged            This patient's ED course included: History, physical examination, reevaluation prior to disposition, laceration repair    This patient has remained hemodynamically stable during their ED course. Counseling: The emergency provider has spoken with the patient and discussed todays results, in addition to providing specific details for the plan of care and counseling regarding the diagnosis and prognosis. Questions are answered at this time and they are agreeable with the plan.       --------------------------------- IMPRESSION AND DISPOSITION ---------------------------------    IMPRESSION  1. Laceration of right lower extremity, initial encounter        DISPOSITION  Disposition: Discharge to home  Patient condition is stable        NOTE: This report was transcribed using voice recognition software.  Every effort was made to ensure accuracy; however, inadvertent computerized transcription errors may be present         Verona Li DO  04/02/22 1740

## 2022-04-11 ENCOUNTER — HOSPITAL ENCOUNTER (OUTPATIENT)
Dept: ULTRASOUND IMAGING | Age: 77
Discharge: HOME | End: 2022-04-13
Payer: MEDICARE

## 2022-04-11 ENCOUNTER — HOSPITAL ENCOUNTER (OUTPATIENT)
Age: 77
Discharge: HOME | End: 2022-04-13
Payer: MEDICARE

## 2022-04-11 ENCOUNTER — OFFICE VISIT (OUTPATIENT)
Dept: SURGERY | Age: 77
End: 2022-04-11
Payer: MEDICARE

## 2022-04-11 VITALS
TEMPERATURE: 97.2 F | RESPIRATION RATE: 16 BRPM | WEIGHT: 162.6 LBS | OXYGEN SATURATION: 100 % | HEART RATE: 58 BPM | DIASTOLIC BLOOD PRESSURE: 71 MMHG | BODY MASS INDEX: 24.08 KG/M2 | SYSTOLIC BLOOD PRESSURE: 154 MMHG | HEIGHT: 69 IN

## 2022-04-11 DIAGNOSIS — I82.403 DEEP VEIN THROMBOSIS (DVT) OF BOTH LOWER EXTREMITIES, UNSPECIFIED CHRONICITY, UNSPECIFIED VEIN (HCC): ICD-10-CM

## 2022-04-11 DIAGNOSIS — M79.89 LEG SWELLING: ICD-10-CM

## 2022-04-11 DIAGNOSIS — S81.801D OPEN WOUND OF RIGHT LOWER LEG, SUBSEQUENT ENCOUNTER: Primary | ICD-10-CM

## 2022-04-11 PROCEDURE — 11042 DBRDMT SUBQ TIS 1ST 20SQCM/<: CPT | Performed by: SURGERY

## 2022-04-11 PROCEDURE — 93970 EXTREMITY STUDY: CPT

## 2022-04-11 PROCEDURE — 99203 OFFICE O/P NEW LOW 30 MIN: CPT | Performed by: SURGERY

## 2022-04-11 RX ORDER — COLOSTOMY BAG, NON-STERILE 1/3" (14")
EACH MISCELLANEOUS
Qty: 1 EACH | Refills: 5 | Status: SHIPPED
Start: 2022-04-11 | End: 2022-05-10

## 2022-04-11 RX ORDER — ROSUVASTATIN CALCIUM 10 MG/1
TABLET, COATED ORAL
COMMUNITY
Start: 2022-04-01

## 2022-04-11 NOTE — PROGRESS NOTES
History and Physical - General Surgery    Patient's Name/Date of Birth: Anurag Bowden / 1945    Date: 4/11/2022    PCP: Corrie Ingram MD    Referring Physician:   Sofia Orellana MD  [phone]      CHIEF COMPLAINT:    Chief Complaint   Patient presents with   [facility] New Patient    Wound Check     R leg Suture removal / venus insuffiency w/ swelling         HISTORY OF PRESENT ILLNESS:    Anurag Bowden is an 64 y.o. male who presents with a laceration s/p repair in the ER with sutures in place. He fell in the woods. This was two weeks ago. He said the wound doesn't look great and is black. The stitches are still in. He has some pain over the area. He also has swelling of the BLE, right greater than left. He has over the counter compression hose which he doesn't use because he said they are too tight.        Past Medical History:   Past Medical History:   Diagnosis Date    Aneurysm of abdominal aorta ([address] 9/27/2012    Deep vein thrombosis of calf (HCC)     R calf vein thrombosis involving post tibial vein with extension in close proximity  to popliteal vein    Gout     History of DVT (deep vein thrombosis) 3/12/2015    History of tobacco use 7/27/2017    Hypertension     Iliac artery aneurysm, bilateral ([address] 6/29/2017    Loss of weight     diet and exercise    Right leg DVT (HCC) 3/12/2015        Past Surgical History:   Past Surgical History:   Procedure Laterality Date    ABDOMINAL AORTIC ANEURYSM REPAIR      2018    HERNIA REPAIR      OPEN REPAIR PERIPHERAL ANEURYSM Bilateral 2018    TONSILLECTOMY          Allergies: Penicillins and Seasonal     Medications:   Current Outpatient Medications   Medication Sig Dispense Refill    mupirocin (BACTROBAN) 2 % ointment APPLY TOPICALLY 2 TIMES DAILY AS DIRECTED      rosuvastatin (CRESTOR) 10 MG tablet TAKE ONE TABLET BY MOUTH EVERY EVENING      furosemide (LASIX) 40 MG tablet TAKE ONE TABLET BY MOUTH EVERY DAY      Apoaequorin (PREVAGEN PO) Take 1 capsule by mouth      apixaban (ELIQUIS) 2.5 MG TABS tablet Take 2.5 mg by mouth 2 times daily      tamsulosin (FLOMAX) 0.4 MG capsule Take 0.4 mg by mouth daily      Rosuvastatin Calcium 10 MG CPSP Take 1 capsule by mouth at bedtime      Probiotic Product (PROBIOTIC BLEND PO) Take by mouth      allopurinol (ZYLOPRIM) 100 MG tablet Take 100 mg by mouth daily       amLODIPine (NORVASC) 5 MG tablet Take 5 mg by mouth daily       Cholecalciferol (VITAMIN D) 2000 UNITS CAPS capsule Take  by mouth daily.  aliskiren (TEKTURNA) 150 MG tablet Take 150 mg by mouth daily. (Patient not taking: Reported on 2022)       No current facility-administered medications for this visit. Social History:   Social History     Tobacco Use    Smoking status: Former Smoker     Packs/day: 2.00     Quit date: 3/12/1995     Years since quittin.1    Smokeless tobacco: Never Used   Substance Use Topics    Alcohol use: Yes     Comment: socially        Family History:   No family history on file. REVIEW OF SYSTEMS:    Constitutional: negative  Eyes: negative  Ears, nose, mouth, throat, and face: negative  Respiratory: negative  Cardiovascular: negative  Gastrointestinal: negative  Genitourinary:negative  Integument/breast: as in hpi  Hematologic/lymphatic: negative  Musculoskeletal:negative  Neurological: negative  Allergic/Immunologic: negative    PHYSICAL EXAM   BP (!) 154/71   Pulse 58   Temp 97.2 °F (36.2 °C)   Resp 16   Ht 5' 9\" (1.753 m)   Wt 162 lb 9.6 oz (73.8 kg)   SpO2 100%   BMI 24.01 kg/m²     General appearance: alert, cooperative and in no acute distress. Eyes: Grossly normal   Lungs: Normal work of breathing  Heart: regular rate  Abdomen: soft, non-tender, non distended  Skin: BLE edema with large eschar over right anterior calf  Neurologic: Alert and oriented x 3. Grossly normal  Musculoskeletal: No edema.       ASSESSMENT AND PLAN:     Rashad Pineda is an 64 y.o. male who presents with

## 2022-04-26 ENCOUNTER — HOSPITAL ENCOUNTER (OUTPATIENT)
Dept: WOUND CARE | Age: 77
Discharge: HOME | End: 2022-04-26
Payer: MEDICARE

## 2022-04-26 VITALS
WEIGHT: 155 LBS | HEART RATE: 58 BPM | HEIGHT: 69 IN | TEMPERATURE: 96.3 F | BODY MASS INDEX: 22.96 KG/M2 | RESPIRATION RATE: 18 BRPM | SYSTOLIC BLOOD PRESSURE: 144 MMHG | DIASTOLIC BLOOD PRESSURE: 66 MMHG

## 2022-04-26 DIAGNOSIS — S81.801D OPEN LEG WOUND, RIGHT, SUBSEQUENT ENCOUNTER: ICD-10-CM

## 2022-04-26 PROCEDURE — 11042 DBRDMT SUBQ TIS 1ST 20SQCM/<: CPT

## 2022-04-26 PROCEDURE — 11042 DBRDMT SUBQ TIS 1ST 20SQCM/<: CPT | Performed by: SURGERY

## 2022-04-26 PROCEDURE — 99213 OFFICE O/P EST LOW 20 MIN: CPT

## 2022-04-26 RX ORDER — CLOBETASOL PROPIONATE 0.5 MG/G
OINTMENT TOPICAL ONCE
Status: CANCELLED | OUTPATIENT
Start: 2022-04-26 | End: 2022-04-26

## 2022-04-26 RX ORDER — LIDOCAINE HYDROCHLORIDE 20 MG/ML
JELLY TOPICAL ONCE
Status: CANCELLED | OUTPATIENT
Start: 2022-04-26 | End: 2022-04-26

## 2022-04-26 RX ORDER — LIDOCAINE 40 MG/G
CREAM TOPICAL ONCE
Status: CANCELLED | OUTPATIENT
Start: 2022-04-26 | End: 2022-04-26

## 2022-04-26 RX ORDER — BETAMETHASONE DIPROPIONATE 0.05 %
OINTMENT (GRAM) TOPICAL ONCE
Status: CANCELLED | OUTPATIENT
Start: 2022-04-26 | End: 2022-04-26

## 2022-04-26 RX ORDER — LIDOCAINE 50 MG/G
OINTMENT TOPICAL ONCE
Status: CANCELLED | OUTPATIENT
Start: 2022-04-26 | End: 2022-04-26

## 2022-04-26 RX ORDER — GINSENG 100 MG
CAPSULE ORAL ONCE
Status: CANCELLED | OUTPATIENT
Start: 2022-04-26 | End: 2022-04-26

## 2022-04-26 RX ORDER — LIDOCAINE HYDROCHLORIDE 40 MG/ML
SOLUTION TOPICAL ONCE
Status: COMPLETED | OUTPATIENT
Start: 2022-04-26 | End: 2022-04-26

## 2022-04-26 RX ORDER — GENTAMICIN SULFATE 1 MG/G
OINTMENT TOPICAL ONCE
Status: CANCELLED | OUTPATIENT
Start: 2022-04-26 | End: 2022-04-26

## 2022-04-26 RX ORDER — BACITRACIN ZINC AND POLYMYXIN B SULFATE 500; 1000 [USP'U]/G; [USP'U]/G
OINTMENT TOPICAL ONCE
Status: CANCELLED | OUTPATIENT
Start: 2022-04-26 | End: 2022-04-26

## 2022-04-26 RX ORDER — LIDOCAINE HYDROCHLORIDE 40 MG/ML
SOLUTION TOPICAL ONCE
Status: CANCELLED | OUTPATIENT
Start: 2022-04-26 | End: 2022-04-26

## 2022-04-26 RX ORDER — BACITRACIN, NEOMYCIN, POLYMYXIN B 400; 3.5; 5 [USP'U]/G; MG/G; [USP'U]/G
OINTMENT TOPICAL ONCE
Status: CANCELLED | OUTPATIENT
Start: 2022-04-26 | End: 2022-04-26

## 2022-04-26 RX ADMIN — LIDOCAINE HYDROCHLORIDE: 40 SOLUTION TOPICAL at 14:24

## 2022-04-26 NOTE — PROGRESS NOTES
[address] [facility],[address]:     [facility] [address] [facility] Derek Oh  [phone] f: [address]:     [address]  [address]  [address] Dept: Purallieshannan Mcmanus6 [id number] [phone]    Patient Information:      Damian Avery  [address] [facility] [address]   [phone]   : 1945  AGE: 64 y.o. GENDER: male   EPISODE DATE: 2022    Insurance:      PRIMARY INSURANCE:  Plan: [id number] ESSENTIAL/PLUS  Coverage: BCBS MEDICARE  Effective Date: 2017  Group Number: [unfilled]  Subscriber Number: [id number] - (Medicare Managed)    Payor/Plan Subscr  Sex Relation Sub. Ins. ID Effective Group Num   1.  BCBS MEDICARERushie Loud 1945 Male Self [id number] [facility] [address]                                    [id number]       Patient Wound Information:      Problem List Items Addressed This Visit        Other    * (Principal) Open leg wound, right, subsequent encounter          WOUNDS REQUIRING DRESSING SUPPLIES:     Wound 22 Pretibial Right #1 (Active)   Wound Image   22 1421   Wound Etiology Traumatic 22 1421   Dressing Status New dressing applied 22 1456   Wound Cleansed Cleansed with saline 22 1456   Dressing/Treatment Alginate;Dry dressing 22 1456   Offloading for Diabetic Foot Ulcers Offloading not required 22 1456   Wound Length (cm) 5.3 cm 22 1421   Wound Width (cm) 2.9 cm 22 1421   Wound Depth (cm) 0.3 cm 22 1421   Wound Surface Area (cm^2) 15.37 cm^2 22 1421   Wound Volume (cm^3) 4.611 cm^3 22 1421   Post-Procedure Length (cm) 5.4 cm 22 1446   Post-Procedure Width (cm) 3 cm 22 1446   Post-Procedure Depth (cm) 0.3 cm 22 1446   Post-Procedure Surface Area (cm^2) 16.2 cm^2 22 1446   Post-Procedure Volume (cm^3) 4.86 cm^3 226   Wound Assessment Fibrin;Pink/red;Slough 04/26/22 1421   Drainage Amount Moderate 04/26/22 1421   Drainage Description Serosanguinous;Brown 04/26/22 1421   Odor None 04/26/22 1421   Mary Grace-wound Assessment Maceration;Blanchable erythema 04/26/22 1421   Number of days: 0          Supplies Requested :      WOUND #: 1   PRIMARY DRESSING:  Alginate pad   Cover and Secure with:  Other chioma noble     FREQUENCY OF DRESSING CHANGES:  Daily           ADDITIONAL ITEMS:  [] Gloves Small  [x] Gloves Medium [] Gloves Large [] Gloves Henrine Casey  [] Tape 1\" [x] Tape 2\" [] Tape 3\"  [] Medipore Tape  [x] Saline  [] Skin Prep   [] Adhesive Remover   [] Cotton Tip Applicators   [] Other:    Patient Wound(s) Debrided: [x] Yes if yes please add date 4/26/22  [] No    Debribement Type: Excisional/Sharp    Is the patient currently on an antibiotic for their Wound(s): [] Yes if yes please add name and dose   [x] No    Patient currently being seen by Home Health: [] Yes   [x] No    Duration for needed supplies:  []15  []30  []60  [x]90 Days    Electronically signed by Jatin Ellison RN on 4/26/2022 at 4:05 PM     Provider Information:      PROVIDER'S NAME: Matti Andrade    NPI: [id number]

## 2022-04-26 NOTE — PROGRESS NOTES
[facility] Followup Visit Note    Referring Physician : Deloris Nielsen MD  [address] RECORD NUMBER:  [id number]  AGE: 64 y.o. GENDER: male  : 1945  EPISODE DATE:  2022    Subjective:     Chief Complaint   Patient presents with    Wound Check     right leg      HISTORY of PRESENT ILLNESS HPI   Reed Albright is a 64 y.o. male who presents today in regards to follow up evaluation and treatment of wound/ulcer. That patient's past medical, family and social hx were reviewed and changes were made if present. History of Wound Context:    Reed Albright is an 64 y.o. male who presents with a laceration s/p repair in the ER with sutures in place. He fell in the woods. This was two weeks ago. He said the wound doesn't look great and is black. The stitches are still in. He has some pain over the area. He also has swelling of the BLE, right greater than left. He has over the counter compression hose which he doesn't use because he said they are too tight.     Wound/Ulcer Pain Timing/Severity: intermittent  Quality of pain: aching  Severity:  6 / 10   Modifying Factors: Pain worsens with walking and Pain is relieved/improved with rest  Associated Signs/Symptoms: pain    Ulcer Identification:  Ulcer Type: traumatic  Contributing Factors: edema    Diabetic/Pressure/Non Pressure Ulcers only:  Ulcer: Non-Pressure ulcer, fat layer exposed    Wound: N/A        PAST MEDICAL HISTORY      Diagnosis Date    Aneurysm of abdominal aorta (HCC) 2012    Deep vein thrombosis [facility] calf ([facility])     R calf vein thrombosis involving post tibial vein with extension in close proximity  to popliteal vein    Gout     History of DVT (deep vein thrombosis) 3/12/2015    History of tobacco use 2017    Hypertension     Iliac artery aneurysm, bilateral ([facility] 2017    Loss of weight     diet and exercise    Right leg DVT ([facility] 3/12/2015     Past Surgical History:   Procedure Laterality Date    ABDOMINAL AORTIC ANEURYSM REPAIR      2018    HERNIA REPAIR      OPEN REPAIR PERIPHERAL ANEURYSM Bilateral 2018    TONSILLECTOMY       History reviewed. No pertinent family history. Social History     Tobacco Use    Smoking status: Former Smoker     Packs/day: 2.00     Quit date: 3/12/1995     Years since quittin.1    Smokeless tobacco: Never Used   Vaping Use    Vaping Use: Never used   Substance Use Topics    Alcohol use: Yes     Comment: socially    Drug use: Never     Allergies   Allergen Reactions    Penicillins     Seasonal      Current Outpatient Medications on File Prior to Encounter   Medication Sig Dispense Refill    mupirocin (BACTROBAN) 2 % ointment APPLY TOPICALLY 2 TIMES DAILY AS DIRECTED      rosuvastatin (CRESTOR) 10 MG tablet TAKE ONE TABLET BY MOUTH EVERY EVENING      Wound Dressings (AQUACEL HYDROFIBER 0.39\"X18\") MISC APPLY DAILY 1 each 5    furosemide (LASIX) 40 MG tablet TAKE ONE TABLET BY MOUTH EVERY DAY      Apoaequorin (PREVAGEN PO) Take 1 capsule by mouth      apixaban (ELIQUIS) 2.5 MG TABS tablet Take 2.5 mg by mouth 2 times daily      tamsulosin (FLOMAX) 0.4 MG capsule Take 0.4 mg by mouth daily      Rosuvastatin Calcium 10 MG CPSP Take 1 capsule by mouth at bedtime      Probiotic Product (PROBIOTIC BLEND PO) Take by mouth      allopurinol (ZYLOPRIM) 100 MG tablet Take 100 mg by mouth daily       amLODIPine (NORVASC) 5 MG tablet Take 5 mg by mouth daily       Cholecalciferol (VITAMIN D) 2000 UNITS CAPS capsule Take  by mouth daily. No current facility-administered medications on file prior to encounter.        REVIEW OF SYSTEMS See HPI    Objective:    BP (!) 144/66   Pulse 58   Temp 96.3 °F (35.7 °C) (Temporal)   Resp 18   Ht 5' 9\" (1.753 m)   Wt 155 lb (70.3 kg)   BMI 22.89 kg/m²   Wt Readings from Last 3 Encounters:   22 155 lb (70.3 kg)   22 162 lb 9.6 oz (73.8 kg)   22 160 lb (72.6 kg)     PHYSICAL EXAM  CONSTITUTIONAL: Awake, alert, cooperative   EYES:  lids and lashes normal   ENT: external ears and nose without lesions   NECK:  supple, symmetrical, trachea midline   SKIN:  Open wound Present    Assessment:     Problem List Items Addressed This Visit     * (Principal) Open leg wound, right, subsequent encounter          Pre Debridement Measurements:  Are located in the [facility]  Documentation Flow Sheet  Post Debridement Measurements:  Wound/Ulcer Descriptions are Pre Debridement except measurements:     Wound 04/26/22 Pretibial Right #1 (Active)   Wound Image   04/26/22 1421   Wound Etiology Traumatic 04/26/22 1421   Wound Length (cm) 5.3 cm 04/26/22 1421   Wound Width (cm) 2.9 cm 04/26/22 1421   Wound Depth (cm) 0.3 cm 04/26/22 1421   Wound Surface Area (cm^2) 15.37 cm^2 04/26/22 1421   Wound Volume (cm^3) 4.611 cm^3 04/26/22 1421   Post-Procedure Length (cm) 5.4 cm 04/26/22 1446   Post-Procedure Width (cm) 3 cm 04/26/22 1446   Post-Procedure Depth (cm) 0.3 cm 04/26/22 1446   Post-Procedure Surface Area (cm^2) 16.2 cm^2 04/26/22 1446   Post-Procedure Volume (cm^3) 4.86 cm^3 04/26/22 1446   Wound Assessment Fibrin;Pink/red;Slough 04/26/22 1421   Drainage Amount Moderate 04/26/22 1421   Drainage Description Serosanguinous;Brown 04/26/22 1421   Odor None 04/26/22 1421   Mary Grace-wound Assessment Maceration;Blanchable erythema 04/26/22 1421   Number of days: 0          Procedure Note  Indications:  Based on my examination of this patient's wound(s)/ulcer(s) today, debridement is required to promote healing and evaluate the wound base. Performed by: Mone Stevenson MD    Consent obtained:  Yes    Time out taken:  Yes    Pain Control: Anesthetic  Anesthetic: 4% Lidocaine Liquid Topical     Debridement:Excisional Debridement    Using curette the wound(s)/ulcer(s) was/were sharply debrided down through and including the removal of subcutaneous tissue.         Devitalized Tissue Debrided:  fibrin, biofilm, slough, exudate and callus to stimulate bleeding to promote healing, post debridement good bleeding base and wound edges noted    Wound/Ulcer #: 1    Percent of Wound/Ulcer Debrided: 100%    Total Surface Area Debrided:  16.2 sq cm     Estimated Blood Loss:  Minimal  Hemostasis Achieved: By pressure    Procedural Pain:  5  / 10   Post Procedural Pain:  3 / 10     Response to treatment:  Well tolerated by patient. Plan:   Treatment Note please see attached Discharge Instructions    Written patient dismissal instructions given to patient and signed by patient or POA. Discharge Instructions       Visit Discharge/Physician Orders    Discharge condition: Stable    Assessment of pain at discharge:  none    Anesthetic used: 4% lidocaine solution    Discharge to: Home    Left via:Private automobile    Accompanied by: accompanied by self    ECF/HHA:     Dressing Orders: right leg ulcer cleanse with normal saline apply alginate and dry dressing daily    Treatment Orders:  Elevate right leg    Eat foods high in protein and vitamin c    Take multivitamin daily  schedule follow up with [facility]  [address] [place] [address],[address] followup visit _________one week____________________  (Please note your next appointment above and if you are unable to keep, kindly give a 24 hour notice. Thank you.)    Physician signature:__________________________      If you experience any of the following, please call the [facility] during business hours:    * Increase in Pain  * Temperature over 101  * Increase in drainage from your wound  * Drainage with a foul odor  * Bleeding  * Increase in swelling  * Need for compression bandage changes due to slippage, breakthrough drainage. If you need medical attention outside of the business hours of the [facility] please contact your PCP or go to the nearest emergency room.         Electronically signed by Thomas Morales MD on 4/26/2022 at 2:51 PM

## 2022-05-03 ENCOUNTER — HOSPITAL ENCOUNTER (OUTPATIENT)
Dept: WOUND CARE | Age: 77
Discharge: HOME | End: 2022-05-03
Payer: MEDICARE

## 2022-05-03 VITALS
BODY MASS INDEX: 22.96 KG/M2 | DIASTOLIC BLOOD PRESSURE: 70 MMHG | SYSTOLIC BLOOD PRESSURE: 138 MMHG | WEIGHT: 155 LBS | HEIGHT: 69 IN | HEART RATE: 50 BPM | TEMPERATURE: 97 F | RESPIRATION RATE: 18 BRPM

## 2022-05-03 DIAGNOSIS — S81.801D OPEN LEG WOUND, RIGHT, SUBSEQUENT ENCOUNTER: Primary | ICD-10-CM

## 2022-05-03 PROCEDURE — 6370000000 HC RX 637 (ALT 250 FOR IP): Performed by: SURGERY

## 2022-05-03 PROCEDURE — 11042 DBRDMT SUBQ TIS 1ST 20SQCM/<: CPT | Performed by: SURGERY

## 2022-05-03 PROCEDURE — 11042 DBRDMT SUBQ TIS 1ST 20SQCM/<: CPT

## 2022-05-03 RX ORDER — BETAMETHASONE DIPROPIONATE 0.05 %
OINTMENT (GRAM) TOPICAL ONCE
Status: CANCELLED | OUTPATIENT
Start: 2022-05-03 | End: 2022-05-03

## 2022-05-03 RX ORDER — BACITRACIN, NEOMYCIN, POLYMYXIN B 400; 3.5; 5 [USP'U]/G; MG/G; [USP'U]/G
OINTMENT TOPICAL ONCE
Status: CANCELLED | OUTPATIENT
Start: 2022-05-03 | End: 2022-05-03

## 2022-05-03 RX ORDER — LIDOCAINE 50 MG/G
OINTMENT TOPICAL ONCE
Status: CANCELLED | OUTPATIENT
Start: 2022-05-03 | End: 2022-05-03

## 2022-05-03 RX ORDER — LIDOCAINE HYDROCHLORIDE 20 MG/ML
JELLY TOPICAL ONCE
Status: CANCELLED | OUTPATIENT
Start: 2022-05-03 | End: 2022-05-03

## 2022-05-03 RX ORDER — LIDOCAINE HYDROCHLORIDE 40 MG/ML
SOLUTION TOPICAL ONCE
Status: CANCELLED | OUTPATIENT
Start: 2022-05-03 | End: 2022-05-03

## 2022-05-03 RX ORDER — LIDOCAINE HYDROCHLORIDE 40 MG/ML
SOLUTION TOPICAL ONCE
Status: COMPLETED | OUTPATIENT
Start: 2022-05-03 | End: 2022-05-03

## 2022-05-03 RX ORDER — CLOBETASOL PROPIONATE 0.5 MG/G
OINTMENT TOPICAL ONCE
Status: CANCELLED | OUTPATIENT
Start: 2022-05-03 | End: 2022-05-03

## 2022-05-03 RX ORDER — GINSENG 100 MG
CAPSULE ORAL ONCE
Status: CANCELLED | OUTPATIENT
Start: 2022-05-03 | End: 2022-05-03

## 2022-05-03 RX ORDER — BACITRACIN ZINC AND POLYMYXIN B SULFATE 500; 1000 [USP'U]/G; [USP'U]/G
OINTMENT TOPICAL ONCE
Status: CANCELLED | OUTPATIENT
Start: 2022-05-03 | End: 2022-05-03

## 2022-05-03 RX ORDER — LIDOCAINE 40 MG/G
CREAM TOPICAL ONCE
Status: CANCELLED | OUTPATIENT
Start: 2022-05-03 | End: 2022-05-03

## 2022-05-03 RX ORDER — GENTAMICIN SULFATE 1 MG/G
OINTMENT TOPICAL ONCE
Status: CANCELLED | OUTPATIENT
Start: 2022-05-03 | End: 2022-05-03

## 2022-05-03 RX ADMIN — LIDOCAINE HYDROCHLORIDE 10 ML: 40 SOLUTION TOPICAL at 15:40

## 2022-05-03 ASSESSMENT — PAIN SCALES - GENERAL: PAINLEVEL_OUTOF10: 0

## 2022-05-03 NOTE — PROGRESS NOTES
Compression [address] [facility] for [facility]ression Stockings:     [facility] [address] [place], [place]  [phone] f: [phone]     Sophia:     Leonardo Duffy [address]  [address] [place] [address]  [phone]  WOUND CARE Dept: [facility] [place] APEZ [phone]    Patient Information:      Tuan Butler  [address] [place] [address]   [phone]   : 1945  AGE: 64 y.o. GENDER: male   TODAYS DATE:  5/3/2022    Insurance:      PRIMARY INSURANCE:  Plan: [employer] ESSENTIAL/PLUS  Coverage: BCBS MEDICARE  Effective Date: 2017  Group Number: [unfilled]  Subscriber Number: [id number] - (Medicare Managed)    Payor/Plan Subscr  Sex Relation Sub. Ins. ID Effective Group Num   1.  BCBS MEDICAREArntonjao Pucker 1945 Male Self [id number] [facility]                                    [id number]       Patient Information:      Problem List Items Addressed This Visit        Other    Open leg wound, right, subsequent encounter - Primary    Relevant Orders    Initiate Outpatient Wound Care Protocol          Wound 22 Pretibial Right #1 (Active)   Wound Image   22 1421   Wound Etiology Traumatic 22 1421   Dressing Status New dressing applied 22 1456   Wound Cleansed Cleansed with saline 22 1456   Dressing/Treatment Alginate;Dry dressing 22 1456   Offloading for Diabetic Foot Ulcers Offloading not required 22 1456   Wound Length (cm) 6.2 cm 22 1535   Wound Width (cm) 2.8 cm 22 1535   Wound Depth (cm) 0.1 cm 22 1535   Wound Surface Area (cm^2) 17.36 cm^2 22 1535   Change in Wound Size % (l*w) -12.95 22 1535   Wound Volume (cm^3) 1.736 cm^3 22 1535   Wound Healing % 62 22 1535   Post-Procedure Length (cm) 6.5 cm 22   Post-Procedure Width (cm) 2.9 cm 228   Post-Procedure Depth (cm) 0.2 cm 22   Post-Procedure Surface Area (cm^2) 18.85 cm^2 05/03/22 1548   Post-Procedure Volume (cm^3) 3.77 cm^3 05/03/22 1548   Wound Assessment Fibrin;Slough;Pink/red 05/03/22 1535   Drainage Amount Moderate 05/03/22 1535   Drainage Description Serosanguinous 05/03/22 1535   Odor None 05/03/22 1535   Mary Grace-wound Assessment Blanchable erythema 05/03/22 1535   Number of days: 7       Right Leg Measurements: (ALL measurements are in cm)  Heel to calf 45cm  Calf-42cm  Ankle 29cm    Left Leg Measurements: (ALL measurements are in cm)       Supplies Requested :     Medicare Requirements  Patient must have a qualifying Active Venus Ulcer if ordering Bilateral Compression Wounds MUST be present on both legs for Medicare Coverage. The patient can Not be on home health or have had a Medicare part A stay in the past 24 hours.     Patient Wound(s) Debrided: [x] Yes if yes please add date 5/3/22   [] No    Debribement Type: Excisional/Sharp    Patient currently being seen by Home Health: [] Yes   [x] No     Compression Type: Circaid Juxtalite, Original, 30-40 mm/Hg, RIGHT lower leg     Provider Information:      PROVIDER'S Jr Hallman    NPI: [id number]

## 2022-05-03 NOTE — PROGRESS NOTES
[facility] Followup Visit Note    Referring Physician : Kimmie Daugherty MD  [address] RECORD NUMBER:  [id number]  AGE: 64 y.o. GENDER: male  : 1945  EPISODE DATE:  5/3/2022    Subjective:     Chief Complaint   Patient presents with    Wound Check     RIGHT LEG      HISTORY of PRESENT ILLNESS HPI   Eve De Souza is a 64 y.o. male who presents today in regards to follow up evaluation and treatment of wound/ulcer. That patient's past medical, family and social hx were reviewed and changes were made if present. History of Wound Context:    Eve De Souza is an 64 y.o. male who presents with a laceration s/p repair in the ER with sutures in place. He fell in the woods. This was two weeks ago. He said the wound doesn't look great and is black. The stitches are still in. He has some pain over the area. He also has swelling of the BLE, right greater than left. He has over the counter compression hose which he doesn't use because he said they are too tight.     Wound/Ulcer Pain Timing/Severity: intermittent  Quality of pain: aching  Severity:  6 / 10   Modifying Factors: Pain worsens with walking and Pain is relieved/improved with rest  Associated Signs/Symptoms: pain    Ulcer Identification:  Ulcer Type: traumatic  Contributing Factors: edema    Diabetic/Pressure/Non Pressure Ulcers only:  Ulcer: Non-Pressure ulcer, fat layer exposed    Wound: N/A        PAST MEDICAL HISTORY      Diagnosis Date    Aneurysm of abdominal aorta (HCC) 2012    Deep vein thrombosis of calf ([facility])     R calf vein thrombosis involving post tibial vein with extension in close proximity  to popliteal vein    Gout     History of DVT (deep vein thrombosis) 3/12/2015    History of tobacco use 2017    Hypertension     Iliac artery aneurysm, bilateral ([facility] 2017    Loss of weight     diet and exercise    Right leg DVT ([facility] [address] 3/12/2015     Past Surgical History:   Procedure Laterality Date    ABDOMINAL AORTIC ANEURYSM REPAIR      2018    HERNIA REPAIR      OPEN REPAIR PERIPHERAL ANEURYSM Bilateral 2018    TONSILLECTOMY       History reviewed. No pertinent family history. Social History     Tobacco Use    Smoking status: Former Smoker     Packs/day: 2.00     Quit date: 3/12/1995     Years since quittin.1    Smokeless tobacco: Never Used   Vaping Use    Vaping Use: Never used   Substance Use Topics    Alcohol use: Yes     Comment: socially    Drug use: Never     Allergies   Allergen Reactions    Penicillins     Seasonal      Current Outpatient Medications on File Prior to Encounter   Medication Sig Dispense Refill    mupirocin (BACTROBAN) 2 % ointment APPLY TOPICALLY 2 TIMES DAILY AS DIRECTED      rosuvastatin (CRESTOR) 10 MG tablet TAKE ONE TABLET BY MOUTH EVERY EVENING      Wound Dressings (AQUACEL HYDROFIBER 0.39\"X18\") MISC APPLY DAILY 1 each 5    furosemide (LASIX) 40 MG tablet TAKE ONE TABLET BY MOUTH EVERY DAY      Apoaequorin (PREVAGEN PO) Take 1 capsule by mouth      apixaban (ELIQUIS) 2.5 MG TABS tablet Take 2.5 mg by mouth 2 times daily      tamsulosin (FLOMAX) 0.4 MG capsule Take 0.4 mg by mouth daily      Rosuvastatin Calcium 10 MG CPSP Take 1 capsule by mouth at bedtime      Probiotic Product (PROBIOTIC BLEND PO) Take by mouth      allopurinol (ZYLOPRIM) 100 MG tablet Take 100 mg by mouth daily       amLODIPine (NORVASC) 5 MG tablet Take 5 mg by mouth daily       Cholecalciferol (VITAMIN D) 2000 UNITS CAPS capsule Take  by mouth daily. No current facility-administered medications on file prior to encounter.        REVIEW OF SYSTEMS See HPI    Objective:    /70   Pulse 50   Temp 97 °F (36.1 °C) (Tympanic)   Resp 18   Ht 5' 9\" (1.753 m)   Wt 155 lb (70.3 kg)   BMI 22.89 kg/m²   Wt Readings from Last 3 Encounters:   22 155 lb (70.3 kg)   22 155 lb (70.3 kg)   22 162 lb 9.6 oz (73.8 kg)     PHYSICAL EXAM  CONSTITUTIONAL:   Awake, alert, cooperative   EYES:  lids and lashes normal   ENT: external ears and nose without lesions   NECK:  supple, symmetrical, trachea midline   SKIN:  Open wound Present    Assessment:     Problem List Items Addressed This Visit     * (Principal) Open leg wound, right, subsequent encounter - Primary    Relevant Orders    Initiate Outpatient Wound Care Protocol          Pre Debridement Measurements:  Are located in the [place]  Documentation Flow Sheet  Post Debridement Measurements:  Wound/Ulcer Descriptions are Pre Debridement except measurements:     Wound 04/26/22 Pretibial Right #1 (Active)   Wound Image   04/26/22 1421   Wound Etiology Traumatic 04/26/22 1421   Dressing Status New dressing applied 05/03/22 1554   Wound Cleansed Cleansed with saline 05/03/22 1554   Dressing/Treatment Alginate;Dry dressing 05/03/22 1554   Offloading for Diabetic Foot Ulcers Offloading not required 04/26/22 1456   Wound Length (cm) 6.2 cm 05/03/22 1535   Wound Width (cm) 2.8 cm 05/03/22 1535   Wound Depth (cm) 0.1 cm 05/03/22 1535   Wound Surface Area (cm^2) 17.36 cm^2 05/03/22 1535   Change in Wound Size % (l*w) -12.95 05/03/22 1535   Wound Volume (cm^3) 1.736 cm^3 05/03/22 1535   Wound Healing % 62 05/03/22 1535   Post-Procedure Length (cm) 6.5 cm 05/03/22 1548   Post-Procedure Width (cm) 2.9 cm 05/03/22 1548   Post-Procedure Depth (cm) 0.2 cm 05/03/22 1548   Post-Procedure Surface Area (cm^2) 18.85 cm^2 05/03/22 1548   Post-Procedure Volume (cm^3) 3.77 cm^3 05/03/22 1548   Wound Assessment Fibrin;Slough;Pink/red 05/03/22 1535   Drainage Amount Moderate 05/03/22 1535   Drainage Description Serosanguinous 05/03/22 1535   Odor None 05/03/22 1535   Mary Grace-wound Assessment Blanchable erythema 05/03/22 1535   Number of days: 7          Procedure Note  Indications:  Based on my examination of this patient's wound(s)/ulcer(s) today, debridement is required to promote healing and evaluate the wound base.     Performed by: Devaughn Polanco MD JESSICA    Consent obtained:  Yes    Time out taken:  Yes    Pain Control: Anesthetic  Anesthetic: 4% Lidocaine Liquid Topical     Debridement:Excisional Debridement    Using curette the wound(s)/ulcer(s) was/were sharply debrided down through and including the removal of subcutaneous tissue. Devitalized Tissue Debrided:  fibrin, biofilm, slough, exudate and callus to stimulate bleeding to promote healing, post debridement good bleeding base and wound edges noted    Wound/Ulcer #: 1    Percent of Wound/Ulcer Debrided: 100%    Total Surface Area Debrided:  18.85 sq cm     Estimated Blood Loss:  Minimal  Hemostasis Achieved: By pressure    Procedural Pain:  5 / 10   Post Procedural Pain:  3 / 10     Response to treatment:  Well tolerated by patient. Plan:   Treatment Note please see attached Discharge Instructions    Written patient dismissal instructions given to patient and signed by patient or POA. Discharge Instructions         Visit Discharge/Physician Orders     Discharge condition: Stable     Assessment of pain at discharge:  none     Anesthetic used: 4% lidocaine solution     Discharge to: Home     Left via:Private automobile     Accompanied by: accompanied by self     ECF/HHA:      Dressing Orders: right leg ulcer cleanse with normal saline apply alginate and dry dressing daily     Treatment Orders:  Elevate right leg     Eat foods high in protein and vitamin c     Take multivitamin daily  schedule follow up with [facility]  [address] [place] [address],[address] followup visit _________one week____________________  (Please note your next appointment above and if you are unable to keep, kindly give a 24 hour notice.  Thank you.)     Physician signature:__________________________        If you experience any of the following, please call the [address] [place] [address] during business hours:     * Increase in Pain  * Temperature over 101  * Increase in drainage from your wound  * Drainage with a foul odor  * Bleeding  * Increase in swelling  * Need for compression bandage changes due to slippage, breakthrough drainage.     If you need medical attention outside of the business hours of the [address] [place] [address] Road please contact your PCP or go to the nearest emergency room.                   Electronically signed by Flory Hood MD on 5/3/2022 at 4:10 PM

## 2022-05-10 ENCOUNTER — TELEPHONE (OUTPATIENT)
Dept: NON INVASIVE DIAGNOSTICS | Age: 77
End: 2022-05-10

## 2022-05-10 ENCOUNTER — HOSPITAL ENCOUNTER (OUTPATIENT)
Dept: WOUND CARE | Age: 77
Discharge: HOME | End: 2022-05-10
Payer: MEDICARE

## 2022-05-10 VITALS
RESPIRATION RATE: 18 BRPM | SYSTOLIC BLOOD PRESSURE: 124 MMHG | BODY MASS INDEX: 22.96 KG/M2 | WEIGHT: 155 LBS | DIASTOLIC BLOOD PRESSURE: 62 MMHG | HEART RATE: 48 BPM | TEMPERATURE: 97.1 F | HEIGHT: 69 IN

## 2022-05-10 DIAGNOSIS — S81.801D OPEN LEG WOUND, RIGHT, SUBSEQUENT ENCOUNTER: Primary | ICD-10-CM

## 2022-05-10 PROCEDURE — 6370000000 HC RX 637 (ALT 250 FOR IP): Performed by: SURGERY

## 2022-05-10 PROCEDURE — 11042 DBRDMT SUBQ TIS 1ST 20SQCM/<: CPT

## 2022-05-10 PROCEDURE — 11042 DBRDMT SUBQ TIS 1ST 20SQCM/<: CPT | Performed by: SURGERY

## 2022-05-10 RX ORDER — LIDOCAINE 40 MG/G
CREAM TOPICAL ONCE
Status: CANCELLED | OUTPATIENT
Start: 2022-05-10 | End: 2022-05-10

## 2022-05-10 RX ORDER — BACITRACIN, NEOMYCIN, POLYMYXIN B 400; 3.5; 5 [USP'U]/G; MG/G; [USP'U]/G
OINTMENT TOPICAL ONCE
Status: CANCELLED | OUTPATIENT
Start: 2022-05-10 | End: 2022-05-10

## 2022-05-10 RX ORDER — CLOBETASOL PROPIONATE 0.5 MG/G
OINTMENT TOPICAL ONCE
Status: CANCELLED | OUTPATIENT
Start: 2022-05-10 | End: 2022-05-10

## 2022-05-10 RX ORDER — LIDOCAINE HYDROCHLORIDE 20 MG/ML
JELLY TOPICAL ONCE
Status: CANCELLED | OUTPATIENT
Start: 2022-05-10 | End: 2022-05-10

## 2022-05-10 RX ORDER — LIDOCAINE HYDROCHLORIDE 40 MG/ML
SOLUTION TOPICAL ONCE
Status: COMPLETED | OUTPATIENT
Start: 2022-05-10 | End: 2022-05-10

## 2022-05-10 RX ORDER — BACITRACIN ZINC AND POLYMYXIN B SULFATE 500; 1000 [USP'U]/G; [USP'U]/G
OINTMENT TOPICAL ONCE
Status: CANCELLED | OUTPATIENT
Start: 2022-05-10 | End: 2022-05-10

## 2022-05-10 RX ORDER — GENTAMICIN SULFATE 1 MG/G
OINTMENT TOPICAL ONCE
Status: CANCELLED | OUTPATIENT
Start: 2022-05-10 | End: 2022-05-10

## 2022-05-10 RX ORDER — GINSENG 100 MG
CAPSULE ORAL ONCE
Status: CANCELLED | OUTPATIENT
Start: 2022-05-10 | End: 2022-05-10

## 2022-05-10 RX ORDER — LIDOCAINE HYDROCHLORIDE 40 MG/ML
SOLUTION TOPICAL ONCE
Status: CANCELLED | OUTPATIENT
Start: 2022-05-10 | End: 2022-05-10

## 2022-05-10 RX ORDER — LIDOCAINE 50 MG/G
OINTMENT TOPICAL ONCE
Status: CANCELLED | OUTPATIENT
Start: 2022-05-10 | End: 2022-05-10

## 2022-05-10 RX ORDER — BETAMETHASONE DIPROPIONATE 0.05 %
OINTMENT (GRAM) TOPICAL ONCE
Status: CANCELLED | OUTPATIENT
Start: 2022-05-10 | End: 2022-05-10

## 2022-05-10 RX ADMIN — LIDOCAINE HYDROCHLORIDE 8 ML: 40 SOLUTION TOPICAL at 16:14

## 2022-05-10 NOTE — PROGRESS NOTES
Wound Healing Center Followup Visit Note    Referring Physician : Heidi Orourke MD  [address] RECORD NUMBER:  [id number]  AGE: 64 y.o. GENDER: male  : 1945  EPISODE DATE:  5/10/2022    Subjective:     Chief Complaint   Patient presents with    Wound Check     RIGHT PRETIB      HISTORY of PRESENT ILLNESS HPI   Stef Dos Santos is a 64 y.o. male who presents today in regards to follow up evaluation and treatment of wound/ulcer. That patient's past medical, family and social hx were reviewed and changes were made if present. History of Wound Context:    Stef Dos Santos is an 64 y.o. male who presents with a laceration s/p repair in the ER with sutures in place. He fell in the woods. This was two weeks ago. He said the wound doesn't look great and is black. The stitches are still in. He has some pain over the area. He also has swelling of the BLE, right greater than left. He has over the counter compression hose which he doesn't use because he said they are too tight.     Wound/Ulcer Pain Timing/Severity: intermittent  Quality of pain: aching  Severity:  6 / 10   Modifying Factors: Pain worsens with walking and Pain is relieved/improved with rest  Associated Signs/Symptoms: pain    Ulcer Identification:  Ulcer Type: traumatic  Contributing Factors: edema    Diabetic/Pressure/Non Pressure Ulcers only:  Ulcer: Non-Pressure ulcer, fat layer exposed    Wound: N/A        PAST MEDICAL HISTORY      Diagnosis Date    Aneurysm of abdominal aorta (HCC) 2012    Deep vein thrombosis [facility] calf ([facility])     R calf vein thrombosis involving post tibial vein with extension in close proximity  to popliteal vein    Gout     History of DVT (deep vein thrombosis) 3/12/2015    History of tobacco use 2017    Hypertension     Iliac artery aneurysm, bilateral ([address] 2017    Loss of weight     diet and exercise    Right leg DVT ([address] 3/12/2015     Past Surgical History:   Procedure Laterality Date    ABDOMINAL AORTIC ANEURYSM REPAIR      2018    HERNIA REPAIR      OPEN REPAIR PERIPHERAL ANEURYSM Bilateral 2018    TONSILLECTOMY       History reviewed. No pertinent family history. Social History     Tobacco Use    Smoking status: Former Smoker     Packs/day: 2.00     Quit date: 3/12/1995     Years since quittin.1    Smokeless tobacco: Never Used   Vaping Use    Vaping Use: Never used   Substance Use Topics    Alcohol use: Yes     Comment: socially    Drug use: Never     Allergies   Allergen Reactions    Penicillins     Seasonal      Current Outpatient Medications on File Prior to Encounter   Medication Sig Dispense Refill    rosuvastatin (CRESTOR) 10 MG tablet TAKE ONE TABLET BY MOUTH EVERY EVENING      Apoaequorin (PREVAGEN PO) Take 1 capsule by mouth      apixaban (ELIQUIS) 2.5 MG TABS tablet Take 2.5 mg by mouth 2 times daily      tamsulosin (FLOMAX) 0.4 MG capsule Take 0.4 mg by mouth daily      Probiotic Product (PROBIOTIC BLEND PO) Take by mouth      allopurinol (ZYLOPRIM) 100 MG tablet Take 100 mg by mouth daily       amLODIPine (NORVASC) 5 MG tablet Take 5 mg by mouth daily       Cholecalciferol (VITAMIN D) 2000 UNITS CAPS capsule Take  by mouth daily. No current facility-administered medications on file prior to encounter.        REVIEW OF SYSTEMS See HPI    Objective:    /62   Pulse (!) 48   Temp 97.1 °F (36.2 °C) (Temporal)   Resp 18   Ht 5' 9\" (1.753 m)   Wt 155 lb (70.3 kg)   BMI 22.89 kg/m²   Wt Readings from Last 3 Encounters:   05/10/22 155 lb (70.3 kg)   22 155 lb (70.3 kg)   22 155 lb (70.3 kg)     PHYSICAL EXAM  CONSTITUTIONAL:   Awake, alert, cooperative   EYES:  lids and lashes normal   ENT: external ears and nose without lesions   NECK:  supple, symmetrical, trachea midline   SKIN:  Open wound Present    Assessment:     Problem List Items Addressed This Visit     * (Principal) Open leg wound, right, subsequent encounter - Primary Relevant Orders    Initiate Outpatient Wound Care Protocol          Pre Debridement Measurements:  Are located in the [place]  Documentation Flow Sheet  Post Debridement Measurements:  Wound/Ulcer Descriptions are Pre Debridement except measurements:     Wound 04/26/22 Pretibial Right #1 (Active)   Wound Image   04/26/22 1421   Wound Etiology Venous 05/10/22 1610   Dressing Status New dressing applied 05/03/22 1554   Wound Cleansed Cleansed with saline 05/03/22 1554   Dressing/Treatment Alginate;Dry dressing 05/03/22 1554   Offloading for Diabetic Foot Ulcers Offloading not required 04/26/22 1456   Wound Length (cm) 5.2 cm 05/10/22 1610   Wound Width (cm) 2.8 cm 05/10/22 1610   Wound Depth (cm) 0.2 cm 05/10/22 1610   Wound Surface Area (cm^2) 14.56 cm^2 05/10/22 1610   Change in Wound Size % (l*w) 5.27 05/10/22 1610   Wound Volume (cm^3) 2.912 cm^3 05/10/22 1610   Wound Healing % 37 05/10/22 1610   Post-Procedure Length (cm) 5.3 cm 05/10/22 1619   Post-Procedure Width (cm) 2.9 cm 05/10/22 1619   Post-Procedure Depth (cm) 0.3 cm 05/10/22 1619   Post-Procedure Surface Area (cm^2) 15.37 cm^2 05/10/22 1619   Post-Procedure Volume (cm^3) 4.611 cm^3 05/10/22 1619   Wound Assessment Pink/red;Fibrin 05/10/22 1610   Drainage Amount Moderate 05/10/22 1610   Drainage Description Serosanguinous; Serous 05/10/22 1610   Odor None 05/10/22 1610   Mary Grace-wound Assessment Hyperpigmented;Blanchable erythema 05/10/22 1610   Number of days: 14          Procedure Note  Indications:  Based on my examination of this patient's wound(s)/ulcer(s) today, debridement is required to promote healing and evaluate the wound base.     Performed by: Lindsey Duque MD    Consent obtained:  Yes    Time out taken:  Yes    Pain Control: Anesthetic  Anesthetic: 4% Lidocaine Liquid Topical     Debridement:Excisional Debridement    Using curette the wound(s)/ulcer(s) was/were sharply debrided down through and including the removal of subcutaneous tissue. Devitalized Tissue Debrided:  fibrin, biofilm, slough, exudate and callus to stimulate bleeding to promote healing, post debridement good bleeding base and wound edges noted    Wound/Ulcer #: 1    Percent of Wound/Ulcer Debrided: 100%    Total Surface Area Debrided:  15.37 sq cm     Estimated Blood Loss:  Minimal  Hemostasis Achieved: By pressure    Procedural Pain:  5  / 10   Post Procedural Pain:  3 / 10     Response to treatment:  Well tolerated by patient. Plan:   Treatment Note please see attached Discharge Instructions    Written patient dismissal instructions given to patient and signed by patient or POA. Discharge Instructions       Visit Discharge/Physician Orders     Discharge condition: Stable     Assessment of pain at discharge:  none     Anesthetic used: 4% lidocaine solution     Discharge to: Home     Left via:Private automobile     Accompanied by: accompanied by self     ECF/HHA:      Dressing Orders: right leg ulcer cleanse with normal saline apply alginate and dry dressing daily     Treatment Orders:  Elevate right leg     Eat foods high in protein and vitamin c     Take multivitamin daily    schedule follow up with [facility]    Lymphedema therapy    [facility] followup visit _________one week____________________  (Please note your next appointment above and if you are unable to keep, kindly give a 24 hour notice.  Thank you.)     Physician signature:__________________________        If you experience any of the following, please call the [address] during business hours:     * Increase in Pain  * Temperature over 101  * Increase in drainage from your wound  * Drainage with a foul odor  * Bleeding  * Increase in swelling  * Need for compression bandage changes due to slippage, breakthrough drainage.     If you need medical attention outside of the business hours of the [facility] please contact your PCP or go to the nearest emergency room.                            Electronically signed by Sherley Garcia MD on 5/10/2022 at 4:23 PM
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
## 2022-05-17 ENCOUNTER — HOSPITAL ENCOUNTER (OUTPATIENT)
Dept: WOUND CARE | Age: 77
Discharge: HOME | End: 2022-05-17
Payer: MEDICARE

## 2022-05-17 VITALS
RESPIRATION RATE: 18 BRPM | HEIGHT: 69 IN | DIASTOLIC BLOOD PRESSURE: 60 MMHG | WEIGHT: 155 LBS | TEMPERATURE: 97.3 F | HEART RATE: 60 BPM | BODY MASS INDEX: 22.96 KG/M2 | SYSTOLIC BLOOD PRESSURE: 130 MMHG

## 2022-05-17 DIAGNOSIS — S81.801D OPEN LEG WOUND, RIGHT, SUBSEQUENT ENCOUNTER: Primary | ICD-10-CM

## 2022-05-17 PROCEDURE — 11042 DBRDMT SUBQ TIS 1ST 20SQCM/<: CPT | Performed by: SURGERY

## 2022-05-17 PROCEDURE — 11042 DBRDMT SUBQ TIS 1ST 20SQCM/<: CPT

## 2022-05-17 RX ORDER — BETAMETHASONE DIPROPIONATE 0.05 %
OINTMENT (GRAM) TOPICAL ONCE
Status: CANCELLED | OUTPATIENT
Start: 2022-05-17 | End: 2022-05-17

## 2022-05-17 RX ORDER — LIDOCAINE HYDROCHLORIDE 40 MG/ML
SOLUTION TOPICAL ONCE
Status: CANCELLED | OUTPATIENT
Start: 2022-05-17 | End: 2022-05-17

## 2022-05-17 RX ORDER — GENTAMICIN SULFATE 1 MG/G
OINTMENT TOPICAL ONCE
Status: CANCELLED | OUTPATIENT
Start: 2022-05-17 | End: 2022-05-17

## 2022-05-17 RX ORDER — LIDOCAINE HYDROCHLORIDE 20 MG/ML
JELLY TOPICAL ONCE
Status: CANCELLED | OUTPATIENT
Start: 2022-05-17 | End: 2022-05-17

## 2022-05-17 RX ORDER — LIDOCAINE HYDROCHLORIDE 40 MG/ML
SOLUTION TOPICAL ONCE
Status: COMPLETED | OUTPATIENT
Start: 2022-05-17 | End: 2022-05-17

## 2022-05-17 RX ORDER — LIDOCAINE 40 MG/G
CREAM TOPICAL ONCE
Status: CANCELLED | OUTPATIENT
Start: 2022-05-17 | End: 2022-05-17

## 2022-05-17 RX ORDER — CLOBETASOL PROPIONATE 0.5 MG/G
OINTMENT TOPICAL ONCE
Status: CANCELLED | OUTPATIENT
Start: 2022-05-17 | End: 2022-05-17

## 2022-05-17 RX ORDER — BACITRACIN, NEOMYCIN, POLYMYXIN B 400; 3.5; 5 [USP'U]/G; MG/G; [USP'U]/G
OINTMENT TOPICAL ONCE
Status: CANCELLED | OUTPATIENT
Start: 2022-05-17 | End: 2022-05-17

## 2022-05-17 RX ORDER — GINSENG 100 MG
CAPSULE ORAL ONCE
Status: CANCELLED | OUTPATIENT
Start: 2022-05-17 | End: 2022-05-17

## 2022-05-17 RX ORDER — BACITRACIN ZINC AND POLYMYXIN B SULFATE 500; 1000 [USP'U]/G; [USP'U]/G
OINTMENT TOPICAL ONCE
Status: CANCELLED | OUTPATIENT
Start: 2022-05-17 | End: 2022-05-17

## 2022-05-17 RX ORDER — LIDOCAINE 50 MG/G
OINTMENT TOPICAL ONCE
Status: CANCELLED | OUTPATIENT
Start: 2022-05-17 | End: 2022-05-17

## 2022-05-17 RX ADMIN — LIDOCAINE HYDROCHLORIDE: 40 SOLUTION TOPICAL at 15:22

## 2022-05-17 ASSESSMENT — PAIN SCALES - GENERAL: PAINLEVEL_OUTOF10: 0

## 2022-05-17 NOTE — PROGRESS NOTES
[facility] Followup Visit Note    Referring Physician : Cristiana Stallings MD  [address] RECORD NUMBER:  [id number]  AGE: 64 y.o. GENDER: male  : 1945  EPISODE DATE:  2022    Subjective:     Chief Complaint   Patient presents with    Wound Check     right leg      HISTORY of PRESENT ILLNESS HPI   Jair Laws is a 64 y.o. male who presents today in regards to follow up evaluation and treatment of wound/ulcer. That patient's past medical, family and social hx were reviewed and changes were made if present. History of Wound Context:    Jair Laws is an 64 y.o. male who presents with a laceration s/p repair in the ER with sutures in place. He fell in the woods. This was two weeks ago. He said the wound doesn't look great and is black. The stitches are still in. He has some pain over the area. He also has swelling of the BLE, right greater than left. He has over the counter compression hose which he doesn't use because he said they are too tight.     Wound/Ulcer Pain Timing/Severity: intermittent  Quality of pain: aching  Severity:  6 / 10   Modifying Factors: Pain worsens with walking and Pain is relieved/improved with rest  Associated Signs/Symptoms: pain    Ulcer Identification:  Ulcer Type: traumatic  Contributing Factors: edema    Diabetic/Pressure/Non Pressure Ulcers only:  Ulcer: Non-Pressure ulcer, fat layer exposed    Wound: N/A        PAST MEDICAL HISTORY      Diagnosis Date    Aneurysm of abdominal aorta (HCC) 2012    Deep vein thrombosis of calf ([facility])     R calf vein thrombosis involving post tibial vein with extension in close proximity  to popliteal vein    Gout     History of DVT (deep vein thrombosis) 3/12/2015    History of tobacco use 2017    Hypertension     Iliac artery aneurysm, bilateral ([address] 2017    Loss of weight     diet and exercise    Right leg D[address] ([facility] [address] 3/12/2015     Past Surgical History:   Procedure Laterality Date    ABDOMINAL AORTIC ANEURYSM REPAIR      2018    HERNIA REPAIR      OPEN REPAIR PERIPHERAL ANEURYSM Bilateral 2018    TONSILLECTOMY       History reviewed. No pertinent family history. Social History     Tobacco Use    Smoking status: Former Smoker     Packs/day: 2.00     Quit date: 3/12/1995     Years since quittin.2    Smokeless tobacco: Never Used   Vaping Use    Vaping Use: Never used   Substance Use Topics    Alcohol use: Yes     Comment: socially    Drug use: Never     Allergies   Allergen Reactions    Penicillins     Seasonal      Current Outpatient Medications on File Prior to Encounter   Medication Sig Dispense Refill    rosuvastatin (CRESTOR) 10 MG tablet TAKE ONE TABLET BY MOUTH EVERY EVENING      Apoaequorin (PREVAGEN PO) Take 1 capsule by mouth      apixaban (ELIQUIS) 2.5 MG TABS tablet Take 2.5 mg by mouth 2 times daily      tamsulosin (FLOMAX) 0.4 MG capsule Take 0.4 mg by mouth daily      Probiotic Product (PROBIOTIC BLEND PO) Take by mouth      allopurinol (ZYLOPRIM) 100 MG tablet Take 100 mg by mouth daily       amLODIPine (NORVASC) 5 MG tablet Take 5 mg by mouth daily       Cholecalciferol (VITAMIN D) 2000 UNITS CAPS capsule Take  by mouth daily. No current facility-administered medications on file prior to encounter.        REVIEW OF SYSTEMS See HPI    Objective:    /60   Pulse 60   Temp 97.3 °F (36.3 °C) (Temporal)   Resp 18   Ht 5' 9\" (1.753 m)   Wt 155 lb (70.3 kg)   BMI 22.89 kg/m²   Wt Readings from Last 3 Encounters:   22 155 lb (70.3 kg)   05/10/22 155 lb (70.3 kg)   22 155 lb (70.3 kg)     PHYSICAL EXAM  CONSTITUTIONAL:   Awake, alert, cooperative   EYES:  lids and lashes normal   ENT: external ears and nose without lesions   NECK:  supple, symmetrical, trachea midline   SKIN:  Open wound Present    Assessment:     Problem List Items Addressed This Visit     * (Principal) Open leg wound, right, subsequent encounter - Primary    Relevant Orders    Initiate Outpatient Wound Care Protocol          Pre Debridement Measurements:  Are located in the [facility]  Documentation Flow Sheet  Post Debridement Measurements:  Wound/Ulcer Descriptions are Pre Debridement except measurements:     Wound 04/26/22 Pretibial Right #1 (Active)   Wound Image   04/26/22 1421   Wound Etiology Venous 05/10/22 1610   Dressing Status New dressing applied 05/03/22 1554   Wound Cleansed Cleansed with saline 05/03/22 1554   Dressing/Treatment Alginate;Dry dressing 05/03/22 1554   Offloading for Diabetic Foot Ulcers Offloading not required 04/26/22 1456   Wound Length (cm) 4.3 cm 05/17/22 1523   Wound Width (cm) 2.3 cm 05/17/22 1523   Wound Depth (cm) 0.1 cm 05/17/22 1523   Wound Surface Area (cm^2) 9.89 cm^2 05/17/22 1523   Change in Wound Size % (l*w) 35.65 05/17/22 1523   Wound Volume (cm^3) 0.989 cm^3 05/17/22 1523   Wound Healing % 79 05/17/22 1523   Post-Procedure Length (cm) 4.4 cm 05/17/22 1533   Post-Procedure Width (cm) 2.5 cm 05/17/22 1533   Post-Procedure Depth (cm) 0.3 cm 05/17/22 1533   Post-Procedure Surface Area (cm^2) 11 cm^2 05/17/22 1533   Post-Procedure Volume (cm^3) 3.3 cm^3 05/17/22 1533   Wound Assessment Fibrin;Slough;Granulation tissue 05/17/22 1523   Drainage Amount Moderate 05/17/22 1523   Drainage Description Serosanguinous; Yellow 05/17/22 1523   Odor None 05/17/22 1523   Mary Grace-wound Assessment Blanchable erythema 05/17/22 1523   Number of days: 21          Procedure Note  Indications:  Based on my examination of this patient's wound(s)/ulcer(s) today, debridement is required to promote healing and evaluate the wound base. Performed by: Latanya Rea MD    Consent obtained:  Yes    Time out taken:  Yes    Pain Control: Anesthetic  Anesthetic: 4% Lidocaine Liquid Topical     Debridement:Excisional Debridement    Using curette the wound(s)/ulcer(s) was/were sharply debrided down through and including the removal of subcutaneous tissue. Devitalized Tissue Debrided:  fibrin, biofilm, slough, exudate and callus to stimulate bleeding to promote healing, post debridement good bleeding base and wound edges noted    Wound/Ulcer #: 1    Percent of Wound/Ulcer Debrided: 100%    Total Surface Area Debrided:  11 sq cm     Estimated Blood Loss:  Minimal  Hemostasis Achieved: By pressure    Procedural Pain:  5  / 10   Post Procedural Pain:  3 / 10     Response to treatment:  Well tolerated by patient. Plan:   Treatment Note please see attached Discharge Instructions    Written patient dismissal instructions given to patient and signed by patient or POA. Discharge Instructions       Visit Discharge/Physician Orders     Discharge condition: Stable     Assessment of pain at discharge:  none     Anesthetic used: 4% lidocaine solution     Discharge to: Home     Left via:Private automobile     Accompanied by: accompanied by self     ECF/HHA:      Dressing Orders: right leg ulcer cleanse with normal saline apply alginate and dry dressing daily     Treatment Orders:  Elevate right leg     Eat foods high in protein and vitamin c     Take multivitamin daily     schedule follow up with [facility]     Lymphedema therapy     [facility] followup visit _________one week____________________  (Please note your next appointment above and if you are unable to keep, kindly give a 24 hour notice.  Thank you.)     Physician signature:__________________________        If you experience any of the following, please call the [facility] during business hours:     * Increase in Pain  * Temperature over 101  * Increase in drainage from your wound  * Drainage with a foul odor  * Bleeding  * Increase in swelling  * Need for compression bandage changes due to slippage, breakthrough drainage.     If you need medical attention outside of the business hours of the [facility] please contact your PCP or go to the nearest emergency room.                                           Electronically signed by Ivan Marino MD on 5/17/2022 at 3:43 PM

## 2022-05-24 ENCOUNTER — HOSPITAL ENCOUNTER (OUTPATIENT)
Dept: WOUND CARE | Age: 77
Discharge: HOME | End: 2022-05-24
Payer: MEDICARE

## 2022-05-24 VITALS
TEMPERATURE: 97.4 F | RESPIRATION RATE: 16 BRPM | SYSTOLIC BLOOD PRESSURE: 126 MMHG | HEART RATE: 58 BPM | DIASTOLIC BLOOD PRESSURE: 68 MMHG

## 2022-05-24 DIAGNOSIS — S81.801D OPEN LEG WOUND, RIGHT, SUBSEQUENT ENCOUNTER: Primary | ICD-10-CM

## 2022-05-24 PROCEDURE — 6370000000 HC RX 637 (ALT 250 FOR IP): Performed by: SURGERY

## 2022-05-24 PROCEDURE — 11042 DBRDMT SUBQ TIS 1ST 20SQCM/<: CPT

## 2022-05-24 PROCEDURE — 11042 DBRDMT SUBQ TIS 1ST 20SQCM/<: CPT | Performed by: SURGERY

## 2022-05-24 RX ORDER — GENTAMICIN SULFATE 1 MG/G
OINTMENT TOPICAL ONCE
Status: CANCELLED | OUTPATIENT
Start: 2022-05-24 | End: 2022-05-24

## 2022-05-24 RX ORDER — LIDOCAINE 40 MG/G
CREAM TOPICAL ONCE
Status: CANCELLED | OUTPATIENT
Start: 2022-05-24 | End: 2022-05-24

## 2022-05-24 RX ORDER — BACITRACIN, NEOMYCIN, POLYMYXIN B 400; 3.5; 5 [USP'U]/G; MG/G; [USP'U]/G
OINTMENT TOPICAL ONCE
Status: CANCELLED | OUTPATIENT
Start: 2022-05-24 | End: 2022-05-24

## 2022-05-24 RX ORDER — LIDOCAINE HYDROCHLORIDE 40 MG/ML
SOLUTION TOPICAL ONCE
Status: CANCELLED | OUTPATIENT
Start: 2022-05-24 | End: 2022-05-24

## 2022-05-24 RX ORDER — LIDOCAINE 50 MG/G
OINTMENT TOPICAL ONCE
Status: CANCELLED | OUTPATIENT
Start: 2022-05-24 | End: 2022-05-24

## 2022-05-24 RX ORDER — BACITRACIN ZINC AND POLYMYXIN B SULFATE 500; 1000 [USP'U]/G; [USP'U]/G
OINTMENT TOPICAL ONCE
Status: CANCELLED | OUTPATIENT
Start: 2022-05-24 | End: 2022-05-24

## 2022-05-24 RX ORDER — BETAMETHASONE DIPROPIONATE 0.05 %
OINTMENT (GRAM) TOPICAL ONCE
Status: CANCELLED | OUTPATIENT
Start: 2022-05-24 | End: 2022-05-24

## 2022-05-24 RX ORDER — GINSENG 100 MG
CAPSULE ORAL ONCE
Status: CANCELLED | OUTPATIENT
Start: 2022-05-24 | End: 2022-05-24

## 2022-05-24 RX ORDER — LIDOCAINE HYDROCHLORIDE 20 MG/ML
JELLY TOPICAL ONCE
Status: CANCELLED | OUTPATIENT
Start: 2022-05-24 | End: 2022-05-24

## 2022-05-24 RX ORDER — LIDOCAINE HYDROCHLORIDE 40 MG/ML
SOLUTION TOPICAL ONCE
Status: COMPLETED | OUTPATIENT
Start: 2022-05-24 | End: 2022-05-24

## 2022-05-24 RX ORDER — CLOBETASOL PROPIONATE 0.5 MG/G
OINTMENT TOPICAL ONCE
Status: CANCELLED | OUTPATIENT
Start: 2022-05-24 | End: 2022-05-24

## 2022-05-24 RX ADMIN — LIDOCAINE HYDROCHLORIDE 6 ML: 40 SOLUTION TOPICAL at 15:50

## 2022-05-24 NOTE — PLAN OF CARE
Problem: Cognitive:  Goal: Knowledge of wound care  Description: Knowledge of wound care  Outcome: Progressing  Goal: Understands risk factors for wounds  Description: Understands risk factors for wounds  Outcome: Progressing     Problem: Wound:  Goal: Will show signs of wound healing; wound closure and no evidence of infection  Description: Will show signs of wound healing; wound closure and no evidence of infection  Outcome: Progressing

## 2022-05-24 NOTE — PROGRESS NOTES
Wound Healing Center Followup Visit Note    Referring Physician : Anne Lugo MD  [address] RECORD NUMBER:  [id number]  AGE: 64 y.o. GENDER: male  : 1945  EPISODE DATE:  2022    Subjective:     Chief Complaint   Patient presents with    Wound Check     wound right leg      HISTORY of PRESENT ILLNESS HPI   Rashad Pineda is a 64 y.o. male who presents today in regards to follow up evaluation and treatment of wound/ulcer. That patient's past medical, family and social hx were reviewed and changes were made if present. History of Wound Context:    Rashad Pineda is an 64 y.o. male who presents with a laceration s/p repair in the ER with sutures in place. He fell in the woods. This was two weeks ago. He said the wound doesn't look great and is black. The stitches are still in. He has some pain over the area. He also has swelling of the BLE, right greater than left. He has over the counter compression hose which he doesn't use because he said they are too tight.     Wound/Ulcer Pain Timing/Severity: intermittent  Quality of pain: aching  Severity:  6 / 10   Modifying Factors: Pain worsens with walking and Pain is relieved/improved with rest  Associated Signs/Symptoms: pain    Ulcer Identification:  Ulcer Type: traumatic  Contributing Factors: edema    Diabetic/Pressure/Non Pressure Ulcers only:  Ulcer: Non-Pressure ulcer, fat layer exposed    Wound: N/A        PAST MEDICAL HISTORY      Diagnosis Date    Aneurysm of abdominal aorta (HCC) 2012    Deep vein thrombosis of calf ([facility])     R calf vein thrombosis involving post tibial vein with extension in close proximity  to popliteal vein    Gout     History of DVT (deep vein thrombosis) 3/12/2015    History of tobacco use 2017    Hypertension     Iliac artery aneurysm, bilateral ([address] 2017    Loss of weight     diet and exercise    Right leg DVT ([facility] [address] 3/12/2015     Past Surgical History:   Procedure Laterality Date  ABDOMINAL AORTIC ANEURYSM REPAIR      2018    HERNIA REPAIR      OPEN REPAIR PERIPHERAL ANEURYSM Bilateral 2018    TONSILLECTOMY       History reviewed. No pertinent family history. Social History     Tobacco Use    Smoking status: Former Smoker     Packs/day: 2.00     Quit date: 3/12/1995     Years since quittin.2    Smokeless tobacco: Never Used   Vaping Use    Vaping Use: Never used   Substance Use Topics    Alcohol use: Yes     Comment: socially    Drug use: Never     Allergies   Allergen Reactions    Penicillins     Seasonal      Current Outpatient Medications on File Prior to Encounter   Medication Sig Dispense Refill    rosuvastatin (CRESTOR) 10 MG tablet TAKE ONE TABLET BY MOUTH EVERY EVENING      Apoaequorin (PREVAGEN PO) Take 1 capsule by mouth      apixaban (ELIQUIS) 2.5 MG TABS tablet Take 2.5 mg by mouth 2 times daily      tamsulosin (FLOMAX) 0.4 MG capsule Take 0.4 mg by mouth daily      Probiotic Product (PROBIOTIC BLEND PO) Take by mouth      allopurinol (ZYLOPRIM) 100 MG tablet Take 100 mg by mouth daily       amLODIPine (NORVASC) 5 MG tablet Take 5 mg by mouth daily       Cholecalciferol (VITAMIN D) 2000 UNITS CAPS capsule Take  by mouth daily. No current facility-administered medications on file prior to encounter.        REVIEW OF SYSTEMS See HPI    Objective:    /68   Pulse 58   Temp 97.4 °F (36.3 °C) (Temporal)   Resp 16   Wt Readings from Last 3 Encounters:   22 155 lb (70.3 kg)   05/10/22 155 lb (70.3 kg)   22 155 lb (70.3 kg)     PHYSICAL EXAM  CONSTITUTIONAL:   Awake, alert, cooperative   EYES:  lids and lashes normal   ENT: external ears and nose without lesions   NECK:  supple, symmetrical, trachea midline   SKIN:  Open wound Present    Assessment:     Problem List Items Addressed This Visit     * (Principal) Open leg wound, right, subsequent encounter - Primary    Relevant Orders    Initiate Outpatient Wound Care Protocol Pre Debridement Measurements:  Are located in the [facility]  Documentation Flow Sheet  Post Debridement Measurements:  Wound/Ulcer Descriptions are Pre Debridement except measurements:     Wound 04/26/22 Pretibial Right #1 (Active)   Wound Image   05/24/22 1548   Wound Etiology Venous 05/10/22 1610   Dressing Status New dressing applied 05/03/22 1554   Wound Cleansed Cleansed with saline 05/03/22 1554   Dressing/Treatment Alginate;Dry dressing 05/03/22 1554   Offloading for Diabetic Foot Ulcers Offloading not required 04/26/22 1456   Wound Length (cm) 4.1 cm 05/24/22 1548   Wound Width (cm) 1.9 cm 05/24/22 1548   Wound Depth (cm) 0.1 cm 05/24/22 1548   Wound Surface Area (cm^2) 7.79 cm^2 05/24/22 1548   Change in Wound Size % (l*w) 49.32 05/24/22 1548   Wound Volume (cm^3) 0.779 cm^3 05/24/22 1548   Wound Healing % 83 05/24/22 1548   Post-Procedure Length (cm) 4.2 cm 05/24/22 1557   Post-Procedure Width (cm) 2 cm 05/24/22 1557   Post-Procedure Depth (cm) 0.1 cm 05/24/22 1557   Post-Procedure Surface Area (cm^2) 8.4 cm^2 05/24/22 1557   Post-Procedure Volume (cm^3) 0.84 cm^3 05/24/22 1557   Wound Assessment Pink/red;Fibrin 05/24/22 1548   Drainage Amount Moderate 05/24/22 1548   Drainage Description Yellow 05/24/22 1548   Odor None 05/24/22 1548   Mary Grace-wound Assessment Edematous; Intact 05/24/22 1548   Number of days: 28          Procedure Note  Indications:  Based on my examination of this patient's wound(s)/ulcer(s) today, debridement is required to promote healing and evaluate the wound base. Performed by: Jovan Jacobson MD    Consent obtained:  Yes    Time out taken:  Yes    Pain Control: Anesthetic  Anesthetic: 4% Lidocaine Liquid Topical     Debridement:Excisional Debridement    Using curette the wound(s)/ulcer(s) was/were sharply debrided down through and including the removal of subcutaneous tissue.         Devitalized Tissue Debrided:  fibrin, biofilm, slough, exudate and callus to stimulate bleeding to promote healing, post debridement good bleeding base and wound edges noted    Wound/Ulcer #: 1    Percent of Wound/Ulcer Debrided: 100%    Total Surface Area Debrided:  8.4 sq cm     Estimated Blood Loss:  Minimal  Hemostasis Achieved: By pressure    Procedural Pain:  5  / 10   Post Procedural Pain:  3 / 10     Response to treatment:  Well tolerated by patient. Plan:   Treatment Note please see attached Discharge Instructions    Written patient dismissal instructions given to patient and signed by patient or POA. Discharge Instructions       Visit Discharge/Physician Orders     Discharge condition: Stable     Assessment of pain at discharge:  none     Anesthetic used: 4% lidocaine solution     Discharge to: Home     Left via:Private automobile     Accompanied by: accompanied by self     ECF/HHA:      Dressing Orders: right leg ulcer cleanse with normal saline apply alginate and dry dressing daily     Treatment Orders:  Elevate right leg     Eat foods high in protein and vitamin c     Take multivitamin daily     schedule follow up with [facility]     Lymphedema therapy     [facility] followup visit _________one week____________________  (Please note your next appointment above and if you are unable to keep, kindly give a 24 hour notice.  Thank you.)     Physician signature:__________________________        If you experience any of the following, please call the [facility] during business hours:     * Increase in Pain  * Temperature over 101  * Increase in drainage from your wound  * Drainage with a foul odor  * Bleeding  * Increase in swelling  * Need for compression bandage changes due to slippage, breakthrough drainage.     If you need medical attention outside of the business hours of the [facility] please contact your PCP or go to the nearest emergency room.                                                                          Electronically signed by Helen Antonio MD on 5/24/2022 at 3:59 PM

## 2022-05-31 ENCOUNTER — HOSPITAL ENCOUNTER (OUTPATIENT)
Dept: WOUND CARE | Age: 77
Discharge: HOME | End: 2022-05-31
Payer: MEDICARE

## 2022-05-31 VITALS
BODY MASS INDEX: 22.96 KG/M2 | HEIGHT: 69 IN | WEIGHT: 155 LBS | RESPIRATION RATE: 16 BRPM | HEART RATE: 54 BPM | TEMPERATURE: 98.1 F | DIASTOLIC BLOOD PRESSURE: 70 MMHG | SYSTOLIC BLOOD PRESSURE: 154 MMHG

## 2022-05-31 DIAGNOSIS — S81.801D OPEN LEG WOUND, RIGHT, SUBSEQUENT ENCOUNTER: Primary | ICD-10-CM

## 2022-05-31 PROCEDURE — 11042 DBRDMT SUBQ TIS 1ST 20SQCM/<: CPT | Performed by: SURGERY

## 2022-05-31 PROCEDURE — 11042 DBRDMT SUBQ TIS 1ST 20SQCM/<: CPT

## 2022-05-31 RX ORDER — LIDOCAINE HYDROCHLORIDE 20 MG/ML
JELLY TOPICAL ONCE
Status: CANCELLED | OUTPATIENT
Start: 2022-05-31 | End: 2022-05-31

## 2022-05-31 RX ORDER — LIDOCAINE 40 MG/G
CREAM TOPICAL ONCE
Status: CANCELLED | OUTPATIENT
Start: 2022-05-31 | End: 2022-05-31

## 2022-05-31 RX ORDER — GINSENG 100 MG
CAPSULE ORAL ONCE
Status: CANCELLED | OUTPATIENT
Start: 2022-05-31 | End: 2022-05-31

## 2022-05-31 RX ORDER — LIDOCAINE 50 MG/G
OINTMENT TOPICAL ONCE
Status: CANCELLED | OUTPATIENT
Start: 2022-05-31 | End: 2022-05-31

## 2022-05-31 RX ORDER — BACITRACIN ZINC AND POLYMYXIN B SULFATE 500; 1000 [USP'U]/G; [USP'U]/G
OINTMENT TOPICAL ONCE
Status: CANCELLED | OUTPATIENT
Start: 2022-05-31 | End: 2022-05-31

## 2022-05-31 RX ORDER — BETAMETHASONE DIPROPIONATE 0.05 %
OINTMENT (GRAM) TOPICAL ONCE
Status: CANCELLED | OUTPATIENT
Start: 2022-05-31 | End: 2022-05-31

## 2022-05-31 RX ORDER — LIDOCAINE HYDROCHLORIDE 40 MG/ML
SOLUTION TOPICAL ONCE
Status: CANCELLED | OUTPATIENT
Start: 2022-05-31 | End: 2022-05-31

## 2022-05-31 RX ORDER — GENTAMICIN SULFATE 1 MG/G
OINTMENT TOPICAL ONCE
Status: CANCELLED | OUTPATIENT
Start: 2022-05-31 | End: 2022-05-31

## 2022-05-31 RX ORDER — BACITRACIN, NEOMYCIN, POLYMYXIN B 400; 3.5; 5 [USP'U]/G; MG/G; [USP'U]/G
OINTMENT TOPICAL ONCE
Status: CANCELLED | OUTPATIENT
Start: 2022-05-31 | End: 2022-05-31

## 2022-05-31 RX ORDER — CLOBETASOL PROPIONATE 0.5 MG/G
OINTMENT TOPICAL ONCE
Status: CANCELLED | OUTPATIENT
Start: 2022-05-31 | End: 2022-05-31

## 2022-05-31 RX ORDER — LIDOCAINE HYDROCHLORIDE 40 MG/ML
SOLUTION TOPICAL ONCE
Status: DISCONTINUED | OUTPATIENT
Start: 2022-05-31 | End: 2022-06-01 | Stop reason: HOSPADM

## 2022-05-31 NOTE — PROGRESS NOTES
[facility]aling Center Followup Visit Note    Referring Physician : Mallory Peck MD  [address] RECORD NUMBER:  [id number]  AGE: 64 y.o. GENDER: male  : 1945  EPISODE DATE:  2022    Subjective:     Chief Complaint   Patient presents with    Wound Check     right leg      HISTORY of PRESENT ILLNESS HPI   Suly Ballesteros is a 64 y.o. male who presents today in regards to follow up evaluation and treatment of wound/ulcer. That patient's past medical, family and social hx were reviewed and changes were made if present. History of Wound Context:    Suly Ballesteros is an 64 y.o. male who presents with a laceration s/p repair in the ER with sutures in place. He fell in the woods. This was two weeks ago. He said the wound doesn't look great and is black. The stitches are still in. He has some pain over the area. He also has swelling of the BLE, right greater than left. He has over the counter compression hose which he doesn't use because he said they are too tight.     Wound/Ulcer Pain Timing/Severity: intermittent  Quality of pain: aching  Severity:  6 / 10   Modifying Factors: Pain worsens with walking and Pain is relieved/improved with rest  Associated Signs/Symptoms: pain    Ulcer Identification:  Ulcer Type: traumatic  Contributing Factors: edema    Diabetic/Pressure/Non Pressure Ulcers only:  Ulcer: Non-Pressure ulcer, fat layer exposed    Wound: N/A        PAST MEDICAL HISTORY      Diagnosis Date    Aneurysm of abdominal aorta (HCC) 2012    Deep vein thrombosis of calf ([facility])     R calf vein thrombosis involving post tibial vein with extension in close proximity  to popliteal vein    Gout     History of DVT (deep vein thrombosis) 3/12/2015    History of tobacco use 2017    Hypertension     Iliac artery aneurysm, bilateral ([facility] 2017    Loss of weight     diet and exercise    Right leg DVT ([facility] [address] 3/12/2015     Past Surgical History:   Procedure Laterality Date    ABDOMINAL AORTIC ANEURYSM REPAIR      2018    HERNIA REPAIR      OPEN REPAIR PERIPHERAL ANEURYSM Bilateral 2018    TONSILLECTOMY       History reviewed. No pertinent family history. Social History     Tobacco Use    Smoking status: Former Smoker     Packs/day: 2.00     Quit date: 3/12/1995     Years since quittin.2    Smokeless tobacco: Never Used   Vaping Use    Vaping Use: Never used   Substance Use Topics    Alcohol use: Yes     Comment: socially    Drug use: Never     Allergies   Allergen Reactions    Penicillins     Seasonal      Current Outpatient Medications on File Prior to Encounter   Medication Sig Dispense Refill    rosuvastatin (CRESTOR) 10 MG tablet TAKE ONE TABLET BY MOUTH EVERY EVENING      Apoaequorin (PREVAGEN PO) Take 1 capsule by mouth      apixaban (ELIQUIS) 2.5 MG TABS tablet Take 2.5 mg by mouth 2 times daily      tamsulosin (FLOMAX) 0.4 MG capsule Take 0.4 mg by mouth daily      Probiotic Product (PROBIOTIC BLEND PO) Take by mouth      allopurinol (ZYLOPRIM) 100 MG tablet Take 100 mg by mouth daily       amLODIPine (NORVASC) 5 MG tablet Take 5 mg by mouth daily       Cholecalciferol (VITAMIN D) 2000 UNITS CAPS capsule Take  by mouth daily. No current facility-administered medications on file prior to encounter.        REVIEW OF SYSTEMS See HPI    Objective:    BP (!) 154/70   Pulse 54   Temp 98.1 °F (36.7 °C) (Temporal)   Resp 16   Ht 5' 9\" (1.753 m)   Wt 155 lb (70.3 kg)   BMI 22.89 kg/m²   Wt Readings from Last 3 Encounters:   22 155 lb (70.3 kg)   22 155 lb (70.3 kg)   05/10/22 155 lb (70.3 kg)     PHYSICAL EXAM  CONSTITUTIONAL:   Awake, alert, cooperative   EYES:  lids and lashes normal   ENT: external ears and nose without lesions   NECK:  supple, symmetrical, trachea midline   SKIN:  Open wound Present    Assessment:     Problem List Items Addressed This Visit     * (Principal) Open leg wound, right, subsequent encounter - Primary Relevant Medications    lidocaine (XYLOCAINE) 4 % external solution (Start on 5/31/2022  3:30 PM)    Other Relevant Orders    Initiate Outpatient Wound Care Protocol          Pre Debridement Measurements:  Are located in the [place]  Documentation Flow Sheet  Post Debridement Measurements:  Wound/Ulcer Descriptions are Pre Debridement except measurements:     Wound 04/26/22 Pretibial Right #1 (Active)   Wound Image   05/24/22 1548   Wound Etiology Venous 05/10/22 1610   Dressing Status New dressing applied 05/24/22 1557   Wound Cleansed Cleansed with saline 05/24/22 1557   Dressing/Treatment Alginate;ABD;Dry dressing 05/24/22 1557   Offloading for Diabetic Foot Ulcers Offloading not required 05/24/22 1557   Wound Length (cm) 3 cm 05/31/22 1504   Wound Width (cm) 1.5 cm 05/31/22 1504   Wound Depth (cm) 0.1 cm 05/31/22 1504   Wound Surface Area (cm^2) 4.5 cm^2 05/31/22 1504   Change in Wound Size % (l*w) 70.72 05/31/22 1504   Wound Volume (cm^3) 0.45 cm^3 05/31/22 1504   Wound Healing % 90 05/31/22 1504   Post-Procedure Length (cm) 3 cm 05/31/22 1515   Post-Procedure Width (cm) 1.6 cm 05/31/22 1515   Post-Procedure Depth (cm) 0.2 cm 05/31/22 1515   Post-Procedure Surface Area (cm^2) 4.8 cm^2 05/31/22 1515   Post-Procedure Volume (cm^3) 0.96 cm^3 05/31/22 1515   Wound Assessment Hyper granulation tissue 05/31/22 1504   Drainage Amount Moderate 05/31/22 1504   Drainage Description Yellow 05/31/22 1504   Odor None 05/31/22 1504   Mary Grace-wound Assessment Edematous; Intact 05/31/22 1504   Number of days: 35          Procedure Note  Indications:  Based on my examination of this patient's wound(s)/ulcer(s) today, debridement is required to promote healing and evaluate the wound base.     Performed by: Delia Benavidez MD    Consent obtained:  Yes    Time out taken:  Yes    Pain Control: Anesthetic  Anesthetic: 4% Lidocaine Liquid Topical     Debridement:Excisional Debridement    Using curette the wound(s)/ulcer(s) was/were sharply debrided down through and including the removal of subcutaneous tissue. Devitalized Tissue Debrided:  fibrin, biofilm, slough, exudate and callus to stimulate bleeding to promote healing, post debridement good bleeding base and wound edges noted    Wound/Ulcer #: 1    Percent of Wound/Ulcer Debrided: 100%    Total Surface Area Debrided:  4.8 sq cm     Estimated Blood Loss:  Minimal  Hemostasis Achieved: By pressure    Procedural Pain:  5  / 10   Post Procedural Pain:  3 / 10     Response to treatment:  Well tolerated by patient. Plan:   Treatment Note please see attached Discharge Instructions    Written patient dismissal instructions given to patient and signed by patient or POA. Discharge Instructions        Visit Discharge/Physician Orders     Discharge condition: Stable     Assessment of pain at discharge:  none     Anesthetic used: 4% lidocaine solution     Discharge to: Home     Left via:Private automobile     Accompanied by: accompanied by self     ECF/HHA:      Dressing Orders: right leg ulcer cleanse with normal saline apply alginate and dry dressing daily     Treatment Orders:  Elevate right leg     Eat foods high in protein and vitamin c     Take multivitamin daily     schedule follow up with [facility]     Lymphedema therapy     [facility] followup visit _________one week____________________  (Please note your next appointment above and if you are unable to keep, kindly give a 24 hour notice.  Thank you.)     Physician signature:__________________________        If you experience any of the following, please call the [facility] during business hours:     * Increase in Pain  * Temperature over 101  * Increase in drainage from your wound  * Drainage with a foul odor  * Bleeding  * Increase in swelling  * Need for compression bandage changes due to slippage, breakthrough drainage.     If you need medical attention outside of the business hours of the [facility] please contact your PCP or go to the nearest emergency room.                                                                                               Electronically signed by Fyl Lee MD on 5/31/2022 at 3:20 PM

## 2022-06-07 ENCOUNTER — HOSPITAL ENCOUNTER (OUTPATIENT)
Dept: WOUND CARE | Age: 77
Discharge: HOME | End: 2022-06-07
Payer: MEDICARE

## 2022-06-07 VITALS
TEMPERATURE: 96.9 F | SYSTOLIC BLOOD PRESSURE: 122 MMHG | RESPIRATION RATE: 18 BRPM | BODY MASS INDEX: 22.07 KG/M2 | WEIGHT: 149 LBS | HEART RATE: 64 BPM | DIASTOLIC BLOOD PRESSURE: 68 MMHG | HEIGHT: 69 IN

## 2022-06-07 DIAGNOSIS — S81.801D OPEN LEG WOUND, RIGHT, SUBSEQUENT ENCOUNTER: Primary | ICD-10-CM

## 2022-06-07 PROCEDURE — 11042 DBRDMT SUBQ TIS 1ST 20SQCM/<: CPT | Performed by: NURSE PRACTITIONER

## 2022-06-07 PROCEDURE — 11042 DBRDMT SUBQ TIS 1ST 20SQCM/<: CPT

## 2022-06-07 RX ORDER — CLOBETASOL PROPIONATE 0.5 MG/G
OINTMENT TOPICAL ONCE
Status: CANCELLED | OUTPATIENT
Start: 2022-06-07 | End: 2022-06-07

## 2022-06-07 RX ORDER — LIDOCAINE HYDROCHLORIDE 40 MG/ML
SOLUTION TOPICAL ONCE
Status: CANCELLED | OUTPATIENT
Start: 2022-06-07 | End: 2022-06-07

## 2022-06-07 RX ORDER — LIDOCAINE 50 MG/G
OINTMENT TOPICAL ONCE
Status: CANCELLED | OUTPATIENT
Start: 2022-06-07 | End: 2022-06-07

## 2022-06-07 RX ORDER — LIDOCAINE 40 MG/G
CREAM TOPICAL ONCE
Status: CANCELLED | OUTPATIENT
Start: 2022-06-07 | End: 2022-06-07

## 2022-06-07 RX ORDER — BETAMETHASONE DIPROPIONATE 0.05 %
OINTMENT (GRAM) TOPICAL ONCE
Status: CANCELLED | OUTPATIENT
Start: 2022-06-07 | End: 2022-06-07

## 2022-06-07 RX ORDER — LIDOCAINE HYDROCHLORIDE 20 MG/ML
JELLY TOPICAL ONCE
Status: CANCELLED | OUTPATIENT
Start: 2022-06-07 | End: 2022-06-07

## 2022-06-07 RX ORDER — GENTAMICIN SULFATE 1 MG/G
OINTMENT TOPICAL ONCE
Status: CANCELLED | OUTPATIENT
Start: 2022-06-07 | End: 2022-06-07

## 2022-06-07 RX ORDER — GINSENG 100 MG
CAPSULE ORAL ONCE
Status: CANCELLED | OUTPATIENT
Start: 2022-06-07 | End: 2022-06-07

## 2022-06-07 RX ORDER — BACITRACIN, NEOMYCIN, POLYMYXIN B 400; 3.5; 5 [USP'U]/G; MG/G; [USP'U]/G
OINTMENT TOPICAL ONCE
Status: CANCELLED | OUTPATIENT
Start: 2022-06-07 | End: 2022-06-07

## 2022-06-07 RX ORDER — BACITRACIN ZINC AND POLYMYXIN B SULFATE 500; 1000 [USP'U]/G; [USP'U]/G
OINTMENT TOPICAL ONCE
Status: CANCELLED | OUTPATIENT
Start: 2022-06-07 | End: 2022-06-07

## 2022-06-07 RX ORDER — LIDOCAINE HYDROCHLORIDE 40 MG/ML
SOLUTION TOPICAL ONCE
Status: DISCONTINUED | OUTPATIENT
Start: 2022-06-07 | End: 2022-06-08 | Stop reason: HOSPADM

## 2022-06-07 NOTE — PROGRESS NOTES
Wound Healing Center Followup Visit Note    Referring Physician : Raza Solomon MD  [address] RECORD NUMBER:  [id number]  AGE: 64 y.o. GENDER: male  : 1945  EPISODE DATE:  2022    Subjective:     Chief Complaint   Patient presents with    Wound Check     RIGHT LEG      HISTORY of PRESENT ILLNESS HPI   Linda Bell is a 64 y.o. male who presents today in regards to follow up evaluation and treatment of wound/ulcer. That patient's past medical, family and social hx were reviewed and changes were made if present. History of Wound Context:    Linda Bell is an 64 y.o. male who presents with a laceration s/p repair in the ER with sutures in place. He fell in the woods. This was two weeks ago. He said the wound doesn't look great and is black. The stitches are still in. He has some pain over the area. He also has swelling of the BLE, right greater than left. He has over the counter compression hose which he doesn't use because he said they are too tight.     22  · Right leg wound debrided   · Overall wound improved    · Continue alginate and dry dressing    Wound/Ulcer Pain Timing/Severity: intermittent  Quality of pain: aching  Severity:  6 / 10   Modifying Factors: Pain worsens with walking and Pain is relieved/improved with rest  Associated Signs/Symptoms: pain    Ulcer Identification:  Ulcer Type: traumatic  Contributing Factors: edema    Diabetic/Pressure/Non Pressure Ulcers only:  Ulcer: Non-Pressure ulcer, fat layer exposed    Wound: N/A        PAST MEDICAL HISTORY      Diagnosis Date    Aneurysm of abdominal aorta ([address] 2012    Deep vein thrombosis of calf (HCC)     R calf vein thrombosis involving post tibial vein with extension in close proximity  to popliteal vein    Gout     History of DVT (deep vein thrombosis) 3/12/2015    History of tobacco use 2017    Hypertension     Iliac artery aneurysm, bilateral ([address] 2017    Loss of weight     diet and exercise    Right leg DVT ([facility] [address] 3/12/2015     Past Surgical History:   Procedure Laterality Date    ABDOMINAL AORTIC ANEURYSM REPAIR      2018    HERNIA REPAIR      OPEN REPAIR PERIPHERAL ANEURYSM Bilateral 2018    TONSILLECTOMY       History reviewed. No pertinent family history. Social History     Tobacco Use    Smoking status: Former Smoker     Packs/day: 2.00     Quit date: 3/12/1995     Years since quittin.2    Smokeless tobacco: Never Used   Vaping Use    Vaping Use: Never used   Substance Use Topics    Alcohol use: Yes     Comment: socially    Drug use: Never     Allergies   Allergen Reactions    Penicillins     Seasonal      Current Outpatient Medications on File Prior to Encounter   Medication Sig Dispense Refill    rosuvastatin (CRESTOR) 10 MG tablet TAKE ONE TABLET BY MOUTH EVERY EVENING      Apoaequorin (PREVAGEN PO) Take 1 capsule by mouth      apixaban (ELIQUIS) 2.5 MG TABS tablet Take 2.5 mg by mouth 2 times daily      tamsulosin (FLOMAX) 0.4 MG capsule Take 0.4 mg by mouth daily      Probiotic Product (PROBIOTIC BLEND PO) Take by mouth      allopurinol (ZYLOPRIM) 100 MG tablet Take 100 mg by mouth daily       amLODIPine (NORVASC) 5 MG tablet Take 5 mg by mouth daily       Cholecalciferol (VITAMIN D) 2000 UNITS CAPS capsule Take  by mouth daily. No current facility-administered medications on file prior to encounter.        REVIEW OF SYSTEMS See HPI    Objective:    /68   Pulse 64   Temp 96.9 °F (36.1 °C) (Temporal)   Resp 18   Ht 5' 9\" (1.753 m)   Wt 149 lb (67.6 kg)   BMI 22.00 kg/m²   Wt Readings from Last 3 Encounters:   22 149 lb (67.6 kg)   22 155 lb (70.3 kg)   22 155 lb (70.3 kg)     PHYSICAL EXAM  CONSTITUTIONAL:   Awake, alert, cooperative   EYES:  lids and lashes normal   ENT: external ears and nose without lesions   NECK:  supple, symmetrical, trachea midline   SKIN:  Open wound Present    Assessment:     Problem List Items Addressed This Visit     Open leg wound, right, subsequent encounter - Primary    Relevant Medications    lidocaine (XYLOCAINE) 4 % external solution    Other Relevant Orders    Initiate Outpatient Wound Care Protocol          Pre Debridement Measurements:  Are located in the [place]  Documentation Flow Sheet  Post Debridement Measurements:  Wound/Ulcer Descriptions are Pre Debridement except measurements:     Wound 04/26/22 Pretibial Right #1 (Active)   Wound Image   05/24/22 1548   Wound Etiology Venous 05/10/22 1610   Dressing Status New dressing applied 05/31/22 1529   Wound Cleansed Cleansed with saline 05/31/22 1529   Dressing/Treatment Alginate;ABD;Dry dressing 05/31/22 1529   Offloading for Diabetic Foot Ulcers Offloading not required 05/24/22 1557   Wound Length (cm) 2.9 cm 06/07/22 1508   Wound Width (cm) 1.1 cm 06/07/22 1508   Wound Depth (cm) 0.1 cm 06/07/22 1508   Wound Surface Area (cm^2) 3.19 cm^2 06/07/22 1508   Change in Wound Size % (l*w) 79.25 06/07/22 1508   Wound Volume (cm^3) 0.319 cm^3 06/07/22 1508   Wound Healing % 93 06/07/22 1508   Post-Procedure Length (cm) 2.9 cm 06/07/22 1516   Post-Procedure Width (cm) 1.2 cm 06/07/22 1516   Post-Procedure Depth (cm) 0.2 cm 06/07/22 1516   Post-Procedure Surface Area (cm^2) 3.48 cm^2 06/07/22 1516   Post-Procedure Volume (cm^3) 0.696 cm^3 06/07/22 1516   Wound Assessment Hyper granulation tissue 06/07/22 1508   Drainage Amount Scant 06/07/22 1508   Drainage Description Thin;Yellow 06/07/22 1508   Odor None 06/07/22 1508   Mary Grace-wound Assessment Dry/flaky 06/07/22 1508   Number of days: 42          Procedure Note  Indications:  Based on my examination of this patient's wound(s)/ulcer(s) today, debridement is required to promote healing and evaluate the wound base.     Performed by: Willy Osler, APRN - CNP    Consent obtained:  Yes    Time out taken:  Yes    Pain Control: Anesthetic  Anesthetic: 4% Lidocaine Liquid Topical Debridement:Excisional Debridement    Using curette the wound(s)/ulcer(s) was/were sharply debrided down through and including the removal of subcutaneous tissue. Devitalized Tissue Debrided:  fibrin, biofilm and slough to stimulate bleeding to promote healing, post debridement good bleeding base and wound edges noted    Wound/Ulcer #: 1    Percent of Wound/Ulcer Debrided: 100%    Total Surface Area Debrided:  3.48 sq cm     Estimated Blood Loss:  Minimal  Hemostasis Achieved: By pressure    Procedural Pain:  5  / 10   Post Procedural Pain:  3 / 10     Response to treatment:  Well tolerated by patient. Plan:   Treatment Note please see attached Discharge Instructions    Written patient dismissal instructions given to patient and signed by patient or POA. Discharge Instructions         Visit Discharge/Physician Orders     Discharge condition: Stable     Assessment of pain at discharge:  none     Anesthetic used: 4% lidocaine solution     Discharge to: Home     Left via:Private automobile     Accompanied by: accompanied by self     ECF/HHA:      Dressing Orders: right leg ulcer cleanse with normal saline apply alginate and dry dressing daily     Treatment Orders:  Elevate right leg     Eat foods high in protein and vitamin c     Take multivitamin daily     schedule follow up with [facility]     Lymphedema therapy     [facility] followup visit _________one week____________________  (Please note your next appointment above and if you are unable to keep, kindly give a 24 hour notice.  Thank you.)     Physician signature:__________________________        If you experience any of the following, please call the [facility] [address] during business hours:     * Increase in Pain  * Temperature over 101  * Increase in drainage from your wound  * Drainage with a foul odor  * Bleeding  * Increase in swelling  * Need for compression bandage changes due to slippage, breakthrough drainage.     If you need medical attention outside of the business hours of the [address] [place] [address] Road please contact your PCP or go to the nearest emergency room.                                                                                                                    Electronically signed by ABRIL Watson CNP on 6/7/2022 at 3:49 PM

## 2022-06-07 NOTE — PROGRESS NOTES
[address] [facility],[address]:     bioCSoutheast [facility] Medical Centerp[facility] [address] [facility] , [address]  P:[phone] f: [address]:     [address]  [facility]  [place] [address]  [address] [facility] Dept: [address] [facility] [phone]    Patient Information:      Eva Hebert  [address] [address]   [phone]   : 1945  AGE: 64 y.o. GENDER: male   EPISODE DATE: 2022    Insurance:      PRIMARY INSURANCE:  Plan: [employer] ESSENTIAL/PLUS  Coverage: [place] MEDICARE  Effective Date: 2017  Group Number: [unfilled]  Subscriber Number: [id number] - (Medicare Managed)    Payor/Plan Subscr  Sex Relation Sub. Ins. ID Effective Group Num   1.  BCBS MEDICAREJeronimo Víctor 1945 Male Self [id number] [facility] [id number]                                    [id number]       Patient Wound Information:      Problem List Items Addressed This Visit        Other    Open leg wound, right, subsequent encounter - Primary    Relevant Medications    lidocaine (XYLOCAINE) 4 % external solution (Start on 2022  3:30 PM)    Other Relevant Orders    Initiate Outpatient Wound Care Protocol          WOUNDS REQUIRING DRESSING SUPPLIES:     Wound 22 Pretibial Right #1 (Active)   Wound Image   22 1548   Wound Etiology Venous 05/10/22 1610   Dressing Status New dressing applied 22 1529   Wound Cleansed Cleansed with saline 22 1529   Dressing/Treatment Alginate;ABD;Dry dressing 22 1529   Offloading for Diabetic Foot Ulcers Offloading not required 22 1557   Wound Length (cm) 2.9 cm 22 1508   Wound Width (cm) 1.1 cm 22 1508   Wound Depth (cm) 0.1 cm 22 1508   Wound Surface Area (cm^2) 3.19 cm^2 22 1508   Change in Wound Size % (l*w) 79.25 22 1508   Wound Volume (cm^3) 0.319 cm^3 22 1508   Wound Healing % 93 22 1508   Post-Procedure Length (cm) 2.9 cm 06/07/22 1516   Post-Procedure Width (cm) 1.2 cm 06/07/22 1516   Post-Procedure Depth (cm) 0.2 cm 06/07/22 1516   Post-Procedure Surface Area (cm^2) 3.48 cm^2 06/07/22 1516   Post-Procedure Volume (cm^3) 0.696 cm^3 06/07/22 1516   Wound Assessment Hyper granulation tissue 06/07/22 1508   Drainage Amount Scant 06/07/22 1508   Drainage Description Thin;Yellow 06/07/22 1508   Odor None 06/07/22 1508   Mary Grace-wound Assessment Dry/flaky 06/07/22 1508   Number of days: 42          Supplies Requested :      WOUND #: 1   PRIMARY DRESSING:  Alginate pad   Cover and Secure with: ABD pad BULKY GUAZE ROLL     FREQUENCY OF DRESSING CHANGES:  Daily           ADDITIONAL ITEMS:  [] Gloves Small  [x] Gloves Medium [] Gloves Large [] Gloves XLarge  [] Tape 1\" [x] Tape 2\" [] Tape 3\"  [] Medipore Tape  [x] Saline  [] Skin Prep   [] Adhesive Remover   [] Cotton Tip Applicators   [] Other:    Patient Wound(s) Debrided: [x] Yes if yes please add date 6/7/22  [] No    Debribement Type: Excisional/Sharp    Is the patient currently on an antibiotic for their Wound(s): [] Yes if yes please add name and dose   [x] No    Patient currently being seen by Home Health: [] Yes   [x] No    Duration for needed supplies:  []15  []30  []60  [x]90 Days    Electronically signed by Jasmin Hodge RN on 6/7/2022 at 3:20 PM     Provider Information:        PROVIDER'S NAME: Anne Marie Mcintosh    NPI: [id number]

## 2022-06-14 ENCOUNTER — HOSPITAL ENCOUNTER (OUTPATIENT)
Dept: WOUND CARE | Age: 77
Discharge: HOME | End: 2022-06-14
Payer: MEDICARE

## 2022-06-14 VITALS
WEIGHT: 150 LBS | DIASTOLIC BLOOD PRESSURE: 62 MMHG | HEART RATE: 66 BPM | BODY MASS INDEX: 22.22 KG/M2 | TEMPERATURE: 97.3 F | HEIGHT: 69 IN | RESPIRATION RATE: 18 BRPM | SYSTOLIC BLOOD PRESSURE: 136 MMHG

## 2022-06-14 PROCEDURE — 11042 DBRDMT SUBQ TIS 1ST 20SQCM/<: CPT

## 2022-06-14 PROCEDURE — 97597 DBRDMT OPN WND 1ST 20 CM/<: CPT | Performed by: SURGERY

## 2022-06-14 PROCEDURE — 97597 DBRDMT OPN WND 1ST 20 CM/<: CPT

## 2022-06-14 NOTE — PROGRESS NOTES
W[facility] Healing Center Followup Visit Note    Referring Physician : Verónica Vickers MD  [address] RECORD NUMBER:  [id number]  AGE: 64 y.o. GENDER: male  : 1945  EPISODE DATE:  2022    Subjective:     Chief Complaint   Patient presents with    Wound Check     right leg      HISTORY of PRESENT ILLNESS HPI   Sony Martinez is a 64 y.o. male who presents today in regards to follow up evaluation and treatment of wound/ulcer. That patient's past medical, family and social hx were reviewed and changes were made if present. History of Wound Context:    Sony Martinez is an 64 y.o. male who presents with a laceration s/p repair in the ER with sutures in place. He fell in the woods. This was two weeks ago. He said the wound doesn't look great and is black. The stitches are still in. He has some pain over the area. He also has swelling of the BLE, right greater than left. He has over the counter compression hose which he doesn't use because he said they are too tight.     Wound/Ulcer Pain Timing/Severity: intermittent  Quality of pain: aching  Severity:  6 / 10   Modifying Factors: Pain worsens with walking and Pain is relieved/improved with rest  Associated Signs/Symptoms: pain    Ulcer Identification:  Ulcer Type: traumatic  Contributing Factors: edema    Diabetic/Pressure/Non Pressure Ulcers only:  Ulcer: Non-Pressure ulcer, fat layer exposed    Wound: N/A        PAST MEDICAL HISTORY      Diagnosis Date    Aneurysm of abdominal aorta (HCC) 2012    Deep vein thrombosis of calf ([facility])     R calf vein thrombosis involving post tibial vein with extension in close proximity  to popliteal vein    Gout     History of DVT (deep vein thrombosis) 3/12/2015    History of tobacco use 2017    Hypertension     Iliac artery aneurysm, bilateral ([facility] [address] 2017    Loss of weight     diet and exercise    Right leg DVT ([facility] [address] 3/12/2015     Past Surgical History:   Procedure Laterality Date    ABDOMINAL AORTIC ANEURYSM REPAIR      2018    HERNIA REPAIR      OPEN REPAIR PERIPHERAL ANEURYSM Bilateral 2018    TONSILLECTOMY       History reviewed. No pertinent family history. Social History     Tobacco Use    Smoking status: Former Smoker     Packs/day: 2.00     Quit date: 3/12/1995     Years since quittin.2    Smokeless tobacco: Never Used   Vaping Use    Vaping Use: Never used   Substance Use Topics    Alcohol use: Yes     Comment: socially    Drug use: Never     Allergies   Allergen Reactions    Penicillins     Seasonal      Current Outpatient Medications on File Prior to Encounter   Medication Sig Dispense Refill    rosuvastatin (CRESTOR) 10 MG tablet TAKE ONE TABLET BY MOUTH EVERY EVENING      Apoaequorin (PREVAGEN PO) Take 1 capsule by mouth      apixaban (ELIQUIS) 2.5 MG TABS tablet Take 2.5 mg by mouth 2 times daily      tamsulosin (FLOMAX) 0.4 MG capsule Take 0.4 mg by mouth daily      Probiotic Product (PROBIOTIC BLEND PO) Take by mouth      allopurinol (ZYLOPRIM) 100 MG tablet Take 100 mg by mouth daily       amLODIPine (NORVASC) 5 MG tablet Take 5 mg by mouth daily       Cholecalciferol (VITAMIN D) 2000 UNITS CAPS capsule Take  by mouth daily. No current facility-administered medications on file prior to encounter.        REVIEW OF SYSTEMS See HPI    Objective:    /62   Pulse 66   Temp 97.3 °F (36.3 °C) (Temporal)   Resp 18   Ht 5' 9\" (1.753 m)   Wt 150 lb (68 kg)   BMI 22.15 kg/m²   Wt Readings from Last 3 Encounters:   22 150 lb (68 kg)   22 149 lb (67.6 kg)   22 155 lb (70.3 kg)     PHYSICAL EXAM  CONSTITUTIONAL:   Awake, alert, cooperative   EYES:  lids and lashes normal   ENT: external ears and nose without lesions   NECK:  supple, symmetrical, trachea midline   SKIN:  Open wound Present    Assessment:     Problem List Items Addressed This Visit     None          Pre Debridement Measurements:  Are located in the [place]  Documentation Flow Sheet  Post Debridement Measurements:  Wound/Ulcer Descriptions are Pre Debridement except measurements:     Wound 04/26/22 Pretibial Right #1 (Active)   Wound Image   05/24/22 1548   Wound Etiology Venous 05/10/22 1610   Dressing Status New dressing applied 05/31/22 1529   Wound Cleansed Cleansed with saline 05/31/22 1529   Dressing/Treatment Alginate;ABD;Dry dressing 05/31/22 1529   Offloading for Diabetic Foot Ulcers Offloading not required 05/24/22 1557   Wound Length (cm) 3.5 cm 06/14/22 1532   Wound Width (cm) 0.5 cm 06/14/22 1532   Wound Depth (cm) 0.1 cm 06/14/22 1532   Wound Surface Area (cm^2) 1.75 cm^2 06/14/22 1532   Change in Wound Size % (l*w) 88.61 06/14/22 1532   Wound Volume (cm^3) 0.175 cm^3 06/14/22 1532   Wound Healing % 96 06/14/22 1532   Post-Procedure Length (cm) 3.5 cm 06/14/22 1537   Post-Procedure Width (cm) 0.6 cm 06/14/22 1537   Post-Procedure Depth (cm) 0.2 cm 06/14/22 1537   Post-Procedure Surface Area (cm^2) 2.1 cm^2 06/14/22 1537   Post-Procedure Volume (cm^3) 0.42 cm^3 06/14/22 1537   Wound Assessment Fibrin;Pink/red 06/14/22 1532   Drainage Amount Scant 06/14/22 1532   Drainage Description Thin;Yellow 06/14/22 1532   Odor None 06/14/22 1532   Mary Grace-wound Assessment Blanchable erythema 06/14/22 1532   Number of days: 49          Procedure Note  Indications:  Based on my examination of this patient's wound(s)/ulcer(s) today, debridement is required to promote healing and evaluate the wound base. Performed by: Thomas Morales MD    Consent obtained:  Yes    Time out taken:  Yes    Pain Control:       Debridement:Excisional Debridement    Using curette the wound(s)/ulcer(s) was/were sharply debrided down through and including the removal of epidermis and dermis.         Devitalized Tissue Debrided:  fibrin, biofilm, slough, exudate and callus to stimulate bleeding to promote healing, post debridement good bleeding base and wound edges noted    Wound/Ulcer #: 1    Percent of Wound/Ulcer Debrided: 100%    Total Surface Area Debrided:  2.1 sq cm     Estimated Blood Loss:  Minimal  Hemostasis Achieved: By pressure    Procedural Pain:  5  / 10   Post Procedural Pain:  3 / 10     Response to treatment:  Well tolerated by patient. Plan:   Treatment Note please see attached Discharge Instructions    Written patient dismissal instructions given to patient and signed by patient or POA. Discharge Instructions        Visit Discharge/Physician Orders     Discharge condition: Stable     Assessment of pain at discharge:  none     Anesthetic used: 4% lidocaine solution     Discharge to: Home     Left via:Private automobile     Accompanied by: accompanied by self     ECF/HHA:      Dressing Orders: right leg ulcer cleanse with normal saline apply alginate and dry dressing daily     Treatment Orders:  Elevate right leg     Eat foods high in protein and vitamin c     Take multivitamin daily     schedule follow up with [facility]     Lymphedema therapy     [facility] followup visit _________2 weeks____________________  (Please note your next appointment above and if you are unable to keep, kindly give a 24 hour notice.  Thank you.)     Physician signature:__________________________        If you experience any of the following, please call the [facility] during business hours:     * Increase in Pain  * Temperature over 101  * Increase in drainage from your wound  * Drainage with a foul odor  * Bleeding  * Increase in swelling  * Need for compression bandage changes due to slippage, breakthrough drainage.     If you need medical attention outside of the business hours of the [facility] please contact your PCP or go to the nearest emergency room.                                                                                                                                         Electronically signed by Benny Lee MD on 6/14/2022 at 3:39 PM

## 2022-06-28 ENCOUNTER — HOSPITAL ENCOUNTER (OUTPATIENT)
Dept: WOUND CARE | Age: 77
Discharge: HOME | End: 2022-06-28
Payer: MEDICARE

## 2022-06-28 VITALS
WEIGHT: 150 LBS | SYSTOLIC BLOOD PRESSURE: 140 MMHG | DIASTOLIC BLOOD PRESSURE: 72 MMHG | HEART RATE: 60 BPM | BODY MASS INDEX: 22.22 KG/M2 | RESPIRATION RATE: 18 BRPM | HEIGHT: 69 IN | TEMPERATURE: 97.1 F

## 2022-06-28 DIAGNOSIS — S81.801D OPEN LEG WOUND, RIGHT, SUBSEQUENT ENCOUNTER: Primary | ICD-10-CM

## 2022-06-28 PROCEDURE — 99212 OFFICE O/P EST SF 10 MIN: CPT | Performed by: SURGERY

## 2022-06-28 PROCEDURE — 99212 OFFICE O/P EST SF 10 MIN: CPT

## 2022-06-28 RX ORDER — BETAMETHASONE DIPROPIONATE 0.05 %
OINTMENT (GRAM) TOPICAL ONCE
Status: CANCELLED | OUTPATIENT
Start: 2022-06-28 | End: 2022-06-28

## 2022-06-28 RX ORDER — LIDOCAINE HYDROCHLORIDE 20 MG/ML
JELLY TOPICAL ONCE
Status: CANCELLED | OUTPATIENT
Start: 2022-06-28 | End: 2022-06-28

## 2022-06-28 RX ORDER — GENTAMICIN SULFATE 1 MG/G
OINTMENT TOPICAL ONCE
Status: CANCELLED | OUTPATIENT
Start: 2022-06-28 | End: 2022-06-28

## 2022-06-28 RX ORDER — GINSENG 100 MG
CAPSULE ORAL ONCE
Status: CANCELLED | OUTPATIENT
Start: 2022-06-28 | End: 2022-06-28

## 2022-06-28 RX ORDER — LIDOCAINE HYDROCHLORIDE 40 MG/ML
SOLUTION TOPICAL ONCE
Status: DISCONTINUED | OUTPATIENT
Start: 2022-06-28 | End: 2022-06-28

## 2022-06-28 RX ORDER — CLOBETASOL PROPIONATE 0.5 MG/G
OINTMENT TOPICAL ONCE
Status: CANCELLED | OUTPATIENT
Start: 2022-06-28 | End: 2022-06-28

## 2022-06-28 RX ORDER — LIDOCAINE 50 MG/G
OINTMENT TOPICAL ONCE
Status: CANCELLED | OUTPATIENT
Start: 2022-06-28 | End: 2022-06-28

## 2022-06-28 RX ORDER — BACITRACIN, NEOMYCIN, POLYMYXIN B 400; 3.5; 5 [USP'U]/G; MG/G; [USP'U]/G
OINTMENT TOPICAL ONCE
Status: CANCELLED | OUTPATIENT
Start: 2022-06-28 | End: 2022-06-28

## 2022-06-28 RX ORDER — LIDOCAINE HYDROCHLORIDE 40 MG/ML
SOLUTION TOPICAL ONCE
Status: CANCELLED | OUTPATIENT
Start: 2022-06-28 | End: 2022-06-28

## 2022-06-28 RX ORDER — BACITRACIN ZINC AND POLYMYXIN B SULFATE 500; 1000 [USP'U]/G; [USP'U]/G
OINTMENT TOPICAL ONCE
Status: CANCELLED | OUTPATIENT
Start: 2022-06-28 | End: 2022-06-28

## 2022-06-28 RX ORDER — LIDOCAINE 40 MG/G
CREAM TOPICAL ONCE
Status: CANCELLED | OUTPATIENT
Start: 2022-06-28 | End: 2022-06-28

## 2022-06-28 NOTE — PLAN OF CARE
Problem: Cognitive:  Goal: Knowledge of wound care  Description: Knowledge of wound care  6/28/2022 1521 by Mile Kingsley RN  Outcome: Completed  6/28/2022 1502 by Mile Kingsley RN  Outcome: Progressing  Goal: Understands risk factors for wounds  Description: Understands risk factors for wounds  6/28/2022 1521 by Mile Kingsley RN  Outcome: Completed  6/28/2022 1502 by Mile Kingsley RN  Outcome: Progressing     Problem: Wound:  Goal: Will show signs of wound healing; wound closure and no evidence of infection  Description: Will show signs of wound healing; wound closure and no evidence of infection  6/28/2022 1521 by Mile Kingsley RN  Outcome: Completed  6/28/2022 1502 by Mile Kingsley RN  Outcome: Progressing

## 2022-06-28 NOTE — PROGRESS NOTES
Wound Healing Center Followup Visit Note    Referring Physician : Minh Rivera MD  [address] RECORD NUMBER:  [id number]  AGE: 64 y.o. GENDER: male  : 1945  EPISODE DATE:  2022    Subjective:     Chief Complaint   Patient presents with    Wound Check     right leg      HISTORY of PRESENT ILLNESS HPI   Lazaro Ivory is a 64 y.o. male who presents today in regards to follow up evaluation and treatment of wound/ulcer. That patient's past medical, family and social hx were reviewed and changes were made if present. History of Wound Context:    Lazaro Ivory is an 64 y.o. male who presents with a laceration s/p repair in the ER with sutures in place. He fell in the woods. This was two weeks ago. He said the wound doesn't look great and is black. The stitches are still in. He has some pain over the area. He also has swelling of the BLE, right greater than left. He has over the counter compression hose which he doesn't use because he said they are too tight.     Wound/Ulcer Pain Timing/Severity: intermittent  Quality of pain: aching  Severity:  6  10   Modifying Factors: Pain worsens with walking and Pain is relieved/improved with rest  Associated Signs/Symptoms: pain    Ulcer Identification:  Ulcer Type: traumatic  Contributing Factors: edema    Diabetic/Pressure/Non Pressure Ulcers only:  Ulcer: Non-Pressure ulcer, fat layer exposed    Wound: N/A        PAST MEDICAL HISTORY      Diagnosis Date    Aneurysm of abdominal aorta (HCC) 2012    [facility] vein thrombosis of calf ([facility])     R calf vein thrombosis involving post tibial vein with extension in close proximity  to popliteal vein    Gout     History of DVT (deep vein thrombosis) 3/12/2015    History of tobacco use 2017    Hypertension     Iliac artery aneurysm, bilateral ([address] 2017    Loss of weight     diet and exercise    Right leg DVT ([facility] [address] 3/12/2015     Past Surgical History:   Procedure Laterality Date    ABDOMINAL AORTIC ANEURYSM REPAIR      2018    HERNIA REPAIR      OPEN REPAIR PERIPHERAL ANEURYSM Bilateral 2018    TONSILLECTOMY       History reviewed. No pertinent family history. Social History     Tobacco Use    Smoking status: Former Smoker     Packs/day: 2.00     Quit date: 3/12/1995     Years since quittin.3    Smokeless tobacco: Never Used   Vaping Use    Vaping Use: Never used   Substance Use Topics    Alcohol use: Yes     Comment: socially    Drug use: Never     Allergies   Allergen Reactions    Penicillins     Seasonal      Current Outpatient Medications on File Prior to Encounter   Medication Sig Dispense Refill    rosuvastatin (CRESTOR) 10 MG tablet TAKE ONE TABLET BY MOUTH EVERY EVENING      Apoaequorin (PREVAGEN PO) Take 1 capsule by mouth      apixaban (ELIQUIS) 2.5 MG TABS tablet Take 2.5 mg by mouth 2 times daily      tamsulosin (FLOMAX) 0.4 MG capsule Take 0.4 mg by mouth daily      Probiotic Product (PROBIOTIC BLEND PO) Take by mouth      allopurinol (ZYLOPRIM) 100 MG tablet Take 100 mg by mouth daily       amLODIPine (NORVASC) 5 MG tablet Take 5 mg by mouth daily       Cholecalciferol (VITAMIN D) 2000 UNITS CAPS capsule Take  by mouth daily. No current facility-administered medications on file prior to encounter.        REVIEW OF SYSTEMS See HPI    Objective:    BP (!) 140/72   Pulse 60   Temp 97.1 °F (36.2 °C) (Temporal)   Resp 18   Ht 5' 9\" (1.753 m)   Wt 150 lb (68 kg)   BMI 22.15 kg/m²   Wt Readings from Last 3 Encounters:   22 150 lb (68 kg)   22 150 lb (68 kg)   22 149 lb (67.6 kg)     PHYSICAL EXAM  CONSTITUTIONAL:   Awake, alert, cooperative   EYES:  lids and lashes normal   ENT: external ears and nose without lesions   NECK:  supple, symmetrical, trachea midline   SKIN:  Open wound healed    Assessment:     Problem List Items Addressed This Visit     * (Principal) Open leg wound, right, subsequent encounter - Primary    Relevant Medications    lidocaine (XYLOCAINE) 4 % external solution (Start on 6/28/2022  3:30 PM)    Other Relevant Orders    Initiate Outpatient Wound Care Protocol          Pre Debridement Measurements:  Are located in the [facility]  Documentation Flow Sheet  Post Debridement Measurements:  Wound/Ulcer Descriptions are Pre Debridement except measurements:     Wound 04/26/22 Pretibial Right #1 (Active)   Wound Image   06/28/22 1502   Wound Etiology Venous 05/10/22 1610   Dressing Status New dressing applied 06/14/22 1549   Wound Cleansed Cleansed with saline 06/14/22 1549   Dressing/Treatment Alginate;ABD;Dry dressing 06/14/22 1549   Offloading for Diabetic Foot Ulcers Offloading not required 05/24/22 1557   Wound Length (cm) 0 cm 06/28/22 1502   Wound Width (cm) 0 cm 06/28/22 1502   Wound Depth (cm) 0 cm 06/28/22 1502   Wound Surface Area (cm^2) 0 cm^2 06/28/22 1502   Change in Wound Size % (l*w) 100 06/28/22 1502   Wound Volume (cm^3) 0 cm^3 06/28/22 1502   Wound Healing % 100 06/28/22 1502   Post-Procedure Length (cm) 3.5 cm 06/14/22 1537   Post-Procedure Width (cm) 0.6 cm 06/14/22 1537   Post-Procedure Depth (cm) 0.2 cm 06/14/22 1537   Post-Procedure Surface Area (cm^2) 2.1 cm^2 06/14/22 1537   Post-Procedure Volume (cm^3) 0.42 cm^3 06/14/22 1537   Wound Assessment Fibrin;Pink/red 06/14/22 1532   Drainage Amount Scant 06/14/22 1532   Drainage Description Thin;Yellow 06/14/22 1532   Odor None 06/14/22 1532   Mary Grace-wound Assessment Blanchable erythema 06/14/22 1532   Number of days: 63       wound healed    Plan:   Treatment Note please see attached Discharge Instructions    Written patient dismissal instructions given to patient and signed by patient or POA.          Discharge Instructions       Visit Discharge/Physician Orders     Discharge condition: Stable     Assessment of pain at discharge:  none     Anesthetic used: 4% lidocaine solution     Discharge to: Home     Left via:Private automobile     Accompanied by: accompanied by self     ECF/HHA:      Dressing Orders: right leg ulcer healed wear compression daily- okay to pad and protect     Treatment Orders:  Elevate right leg     Eat foods high in protein and vitamin c     Take multivitamin daily     schedule follow up with [facility]     Lymphedema therapy     [facility] followup visit ________as needed____________  (Please note your next appointment above and if you are unable to keep, kindly give a 24 hour notice.  Thank you.)     Physician signature:__________________________        If you experience any of the following, please call the [facility] during business hours:     * Increase in Pain  * Temperature over 101  * Increase in drainage from your wound  * Drainage with a foul odor  * Bleeding  * Increase in swelling  * Need for compression bandage changes due to slippage, breakthrough drainage.     If you need medical attention outside of the business hours of the [facility] please contact your PCP or go to the nearest emergency room.                                                                                                                                                              Electronically signed by Ivan Marino MD on 6/28/2022 at 3:15 PM

## 2022-09-17 ENCOUNTER — HOSPITAL ENCOUNTER (OUTPATIENT)
Age: 77
Discharge: HOME | End: 2022-09-17
Payer: MEDICARE

## 2022-09-17 LAB
ALBUMIN SERPL-MCNC: 4.5 G/DL (ref 3.5–5.2)
ALP BLD-CCNC: 54 U/L (ref 40–129)
ALT SERPL-CCNC: 14 U/L (ref 0–40)
ANION GAP SERPL CALCULATED.3IONS-SCNC: 13 MMOL/L (ref 7–16)
AST SERPL-CCNC: 18 U/L (ref 0–39)
BILIRUB SERPL-MCNC: 0.3 MG/DL (ref 0–1.2)
BUN BLDV-MCNC: 72 MG/DL (ref 6–23)
CALCIUM SERPL-MCNC: 8.9 MG/DL (ref 8.6–10.2)
CHLORIDE BLD-SCNC: 104 MMOL/L (ref 98–107)
CHOLESTEROL, TOTAL: 121 MG/DL (ref 0–199)
CO2: 24 MMOL/L (ref 22–29)
CREAT SERPL-MCNC: 4.8 MG/DL (ref 0.7–1.2)
GFR AFRICAN AMERICAN: 14
GFR NON-AFRICAN AMERICAN: 12 ML/MIN/1.73
GLUCOSE BLD-MCNC: 85 MG/DL (ref 74–99)
HBA1C MFR BLD: 5.6 % (ref 4–5.6)
HCT VFR BLD CALC: 34.1 % (ref 37–54)
HDLC SERPL-MCNC: 66 MG/DL
HEMOGLOBIN: 10.8 G/DL (ref 12.5–16.5)
LDL CHOLESTEROL CALCULATED: 43 MG/DL (ref 0–99)
MCH RBC QN AUTO: 30.5 PG (ref 26–35)
MCHC RBC AUTO-ENTMCNC: 31.7 % (ref 32–34.5)
MCV RBC AUTO: 96.3 FL (ref 80–99.9)
PDW BLD-RTO: 14.6 FL (ref 11.5–15)
PLATELET # BLD: 236 E9/L (ref 130–450)
PMV BLD AUTO: 10.1 FL (ref 7–12)
POTASSIUM SERPL-SCNC: 4.2 MMOL/L (ref 3.5–5)
PROSTATE SPECIFIC ANTIGEN: 2.41 NG/ML (ref 0–4)
RBC # BLD: 3.54 E12/L (ref 3.8–5.8)
SODIUM BLD-SCNC: 141 MMOL/L (ref 132–146)
TOTAL PROTEIN: 7.4 G/DL (ref 6.4–8.3)
TRIGL SERPL-MCNC: 61 MG/DL (ref 0–149)
VLDLC SERPL CALC-MCNC: 12 MG/DL
WBC # BLD: 8.7 E9/L (ref 4.5–11.5)

## 2022-09-17 PROCEDURE — 80061 LIPID PANEL: CPT

## 2022-09-17 PROCEDURE — 85027 COMPLETE CBC AUTOMATED: CPT

## 2022-09-17 PROCEDURE — 36415 COLL VENOUS BLD VENIPUNCTURE: CPT

## 2022-09-17 PROCEDURE — 80053 COMPREHEN METABOLIC PANEL: CPT

## 2022-09-17 PROCEDURE — 83695 ASSAY OF LIPOPROTEIN(A): CPT

## 2022-09-17 PROCEDURE — G0103 PSA SCREENING: HCPCS

## 2022-09-17 PROCEDURE — 83036 HEMOGLOBIN GLYCOSYLATED A1C: CPT

## 2022-09-20 LAB — LIPOPROTEIN (A): 22 MG/DL

## 2022-11-09 ENCOUNTER — TRANSCRIBE ORDERS (OUTPATIENT)
Dept: ADMINISTRATIVE | Age: 77
End: 2022-11-09

## 2022-11-09 DIAGNOSIS — I82.413 ACUTE DEEP VEIN THROMBOSIS OF BOTH FEMORAL VEINS (HCC): Primary | ICD-10-CM

## 2022-11-22 ENCOUNTER — HOSPITAL ENCOUNTER (OUTPATIENT)
Dept: ULTRASOUND IMAGING | Age: 77
Discharge: HOME | End: 2022-11-24
Payer: MEDICARE

## 2022-11-22 DIAGNOSIS — I82.413 ACUTE DEEP VEIN THROMBOSIS OF BOTH FEMORAL VEINS (HCC): ICD-10-CM

## 2022-11-22 PROCEDURE — 93970 EXTREMITY STUDY: CPT

## 2022-12-07 ENCOUNTER — CLINICAL DOCUMENTATION (OUTPATIENT)
Dept: SURGERY | Age: 77
End: 2022-12-07

## 2022-12-07 NOTE — PROGRESS NOTES
MA received referral from Dr Glory Lynch.   Electronically signed by Tammy Jason MA on 12/7/2022 at 1:09 PM

## 2023-01-03 DIAGNOSIS — N18.5 ANEMIA OF CHRONIC RENAL FAILURE, STAGE 5 (HCC): ICD-10-CM

## 2023-01-03 DIAGNOSIS — D63.1 ANEMIA OF CHRONIC RENAL FAILURE, STAGE 5 (HCC): ICD-10-CM

## 2023-01-09 RX ORDER — SODIUM CHLORIDE 9 MG/ML
INJECTION, SOLUTION INTRAVENOUS CONTINUOUS
OUTPATIENT
Start: 2023-01-12

## 2023-01-09 RX ORDER — EPINEPHRINE 1 MG/ML
0.3 INJECTION, SOLUTION, CONCENTRATE INTRAVENOUS PRN
OUTPATIENT
Start: 2023-01-12

## 2023-01-09 RX ORDER — HEPARIN SODIUM (PORCINE) LOCK FLUSH IV SOLN 100 UNIT/ML 100 UNIT/ML
500 SOLUTION INTRAVENOUS PRN
OUTPATIENT
Start: 2023-01-12

## 2023-01-09 RX ORDER — SODIUM CHLORIDE 9 MG/ML
5-250 INJECTION, SOLUTION INTRAVENOUS PRN
OUTPATIENT
Start: 2023-01-12

## 2023-01-09 RX ORDER — DIPHENHYDRAMINE HYDROCHLORIDE 50 MG/ML
50 INJECTION INTRAMUSCULAR; INTRAVENOUS
OUTPATIENT
Start: 2023-01-12

## 2023-01-09 RX ORDER — SODIUM CHLORIDE 0.9 % (FLUSH) 0.9 %
5-40 SYRINGE (ML) INJECTION PRN
OUTPATIENT
Start: 2023-01-12

## 2023-01-13 ENCOUNTER — TELEPHONE (OUTPATIENT)
Dept: CARDIOLOGY | Age: 78
End: 2023-01-13

## 2023-01-20 ENCOUNTER — HOSPITAL ENCOUNTER (OUTPATIENT)
Dept: INFUSION THERAPY | Age: 78
Setting detail: INFUSION SERIES
Discharge: HOME | End: 2023-01-20
Payer: MEDICARE

## 2023-01-20 VITALS
SYSTOLIC BLOOD PRESSURE: 132 MMHG | DIASTOLIC BLOOD PRESSURE: 67 MMHG | HEART RATE: 50 BPM | WEIGHT: 150 LBS | OXYGEN SATURATION: 99 % | HEIGHT: 68 IN | BODY MASS INDEX: 22.73 KG/M2 | RESPIRATION RATE: 16 BRPM | TEMPERATURE: 96.8 F

## 2023-01-20 DIAGNOSIS — D63.1 ANEMIA OF CHRONIC RENAL FAILURE, STAGE 5 (HCC): Primary | ICD-10-CM

## 2023-01-20 DIAGNOSIS — N18.5 ANEMIA OF CHRONIC RENAL FAILURE, STAGE 5 (HCC): Primary | ICD-10-CM

## 2023-01-20 PROCEDURE — 6360000002 HC RX W HCPCS: Performed by: INTERNAL MEDICINE

## 2023-01-20 PROCEDURE — 96366 THER/PROPH/DIAG IV INF ADDON: CPT

## 2023-01-20 PROCEDURE — 96365 THER/PROPH/DIAG IV INF INIT: CPT

## 2023-01-20 PROCEDURE — 2580000003 HC RX 258: Performed by: INTERNAL MEDICINE

## 2023-01-20 RX ORDER — SODIUM CHLORIDE 9 MG/ML
5-250 INJECTION, SOLUTION INTRAVENOUS PRN
Status: CANCELLED | OUTPATIENT
Start: 2023-01-27

## 2023-01-20 RX ORDER — EPINEPHRINE 1 MG/ML
0.3 INJECTION, SOLUTION, CONCENTRATE INTRAVENOUS PRN
Status: CANCELLED | OUTPATIENT
Start: 2023-01-27

## 2023-01-20 RX ORDER — DIPHENHYDRAMINE HYDROCHLORIDE 50 MG/ML
50 INJECTION INTRAMUSCULAR; INTRAVENOUS
Status: CANCELLED | OUTPATIENT
Start: 2023-01-27

## 2023-01-20 RX ORDER — SODIUM CHLORIDE 9 MG/ML
5-250 INJECTION, SOLUTION INTRAVENOUS PRN
Status: DISCONTINUED | OUTPATIENT
Start: 2023-01-20 | End: 2023-01-21 | Stop reason: HOSPADM

## 2023-01-20 RX ORDER — SODIUM CHLORIDE 0.9 % (FLUSH) 0.9 %
5-40 SYRINGE (ML) INJECTION PRN
Status: CANCELLED | OUTPATIENT
Start: 2023-01-27

## 2023-01-20 RX ORDER — SODIUM CHLORIDE 9 MG/ML
INJECTION, SOLUTION INTRAVENOUS CONTINUOUS
Status: CANCELLED | OUTPATIENT
Start: 2023-01-27

## 2023-01-20 RX ORDER — HEPARIN SODIUM (PORCINE) LOCK FLUSH IV SOLN 100 UNIT/ML 100 UNIT/ML
500 SOLUTION INTRAVENOUS PRN
Status: CANCELLED | OUTPATIENT
Start: 2023-01-27

## 2023-01-20 RX ORDER — SODIUM CHLORIDE 0.9 % (FLUSH) 0.9 %
5-40 SYRINGE (ML) INJECTION PRN
Status: DISCONTINUED | OUTPATIENT
Start: 2023-01-20 | End: 2023-01-21 | Stop reason: HOSPADM

## 2023-01-20 RX ADMIN — SODIUM CHLORIDE 100 MG: 9 INJECTION, SOLUTION INTRAVENOUS at 08:27

## 2023-01-20 RX ADMIN — SODIUM CHLORIDE, PRESERVATIVE FREE 10 ML: 5 INJECTION INTRAVENOUS at 07:32

## 2023-01-20 RX ADMIN — SODIUM CHLORIDE 20 ML/HR: 9 INJECTION, SOLUTION INTRAVENOUS at 07:35

## 2023-01-20 RX ADMIN — SODIUM CHLORIDE, PRESERVATIVE FREE 10 ML: 5 INJECTION INTRAVENOUS at 09:40

## 2023-01-20 RX ADMIN — SODIUM CHLORIDE 25 MG: 9 INJECTION, SOLUTION INTRAVENOUS at 07:38

## 2023-01-20 NOTE — PROGRESS NOTES
Tolerated infusion well. Reviewed therapy plan, offered education material and/or discharge material, reviewed medication information and signs and symptoms  and educated on possible side effects, verbalizes good knowledge of current plan patient verbalizes understanding, and has no signs or symptoms to report at this time. Patient discharged. Patient alert and oriented x3. No distress noted. Vital signs stable. Patient denies any new or worsening pain. Patient denies any needs. All questions answered. Next appointment scheduled. declined copy of AVS. Patient stayed for 30 minute observation after completion of infusion.

## 2023-01-27 ENCOUNTER — HOSPITAL ENCOUNTER (OUTPATIENT)
Dept: INFUSION THERAPY | Age: 78
Setting detail: INFUSION SERIES
Discharge: HOME | End: 2023-01-27
Payer: MEDICARE

## 2023-01-27 VITALS
WEIGHT: 150 LBS | HEART RATE: 43 BPM | SYSTOLIC BLOOD PRESSURE: 158 MMHG | DIASTOLIC BLOOD PRESSURE: 57 MMHG | TEMPERATURE: 97.2 F | HEIGHT: 68 IN | RESPIRATION RATE: 16 BRPM | OXYGEN SATURATION: 99 % | BODY MASS INDEX: 22.73 KG/M2

## 2023-01-27 DIAGNOSIS — N18.5 ANEMIA OF CHRONIC RENAL FAILURE, STAGE 5 (HCC): Primary | ICD-10-CM

## 2023-01-27 DIAGNOSIS — D63.1 ANEMIA OF CHRONIC RENAL FAILURE, STAGE 5 (HCC): Primary | ICD-10-CM

## 2023-01-27 PROCEDURE — 2580000003 HC RX 258: Performed by: INTERNAL MEDICINE

## 2023-01-27 PROCEDURE — 96365 THER/PROPH/DIAG IV INF INIT: CPT

## 2023-01-27 PROCEDURE — 6360000002 HC RX W HCPCS: Performed by: INTERNAL MEDICINE

## 2023-01-27 RX ORDER — DIPHENHYDRAMINE HYDROCHLORIDE 50 MG/ML
50 INJECTION INTRAMUSCULAR; INTRAVENOUS
Status: CANCELLED | OUTPATIENT
Start: 2023-02-03

## 2023-01-27 RX ORDER — SODIUM CHLORIDE 9 MG/ML
INJECTION, SOLUTION INTRAVENOUS CONTINUOUS
Status: CANCELLED | OUTPATIENT
Start: 2023-02-03

## 2023-01-27 RX ORDER — SODIUM CHLORIDE 0.9 % (FLUSH) 0.9 %
5-40 SYRINGE (ML) INJECTION PRN
Status: DISCONTINUED | OUTPATIENT
Start: 2023-01-27 | End: 2023-01-28 | Stop reason: HOSPADM

## 2023-01-27 RX ORDER — SODIUM CHLORIDE 0.9 % (FLUSH) 0.9 %
5-40 SYRINGE (ML) INJECTION PRN
Status: CANCELLED | OUTPATIENT
Start: 2023-02-03

## 2023-01-27 RX ORDER — HEPARIN SODIUM (PORCINE) LOCK FLUSH IV SOLN 100 UNIT/ML 100 UNIT/ML
500 SOLUTION INTRAVENOUS PRN
Status: CANCELLED | OUTPATIENT
Start: 2023-02-03

## 2023-01-27 RX ORDER — SODIUM CHLORIDE 9 MG/ML
5-250 INJECTION, SOLUTION INTRAVENOUS PRN
Status: CANCELLED | OUTPATIENT
Start: 2023-02-03

## 2023-01-27 RX ORDER — SODIUM CHLORIDE 9 MG/ML
5-250 INJECTION, SOLUTION INTRAVENOUS PRN
Status: DISCONTINUED | OUTPATIENT
Start: 2023-01-27 | End: 2023-01-28 | Stop reason: HOSPADM

## 2023-01-27 RX ORDER — EPINEPHRINE 1 MG/ML
0.3 INJECTION, SOLUTION, CONCENTRATE INTRAVENOUS PRN
Status: CANCELLED | OUTPATIENT
Start: 2023-02-03

## 2023-01-27 RX ADMIN — SODIUM CHLORIDE, PRESERVATIVE FREE 10 ML: 5 INJECTION INTRAVENOUS at 12:59

## 2023-01-27 RX ADMIN — SODIUM CHLORIDE 125 MG: 9 INJECTION, SOLUTION INTRAVENOUS at 11:58

## 2023-01-27 RX ADMIN — SODIUM CHLORIDE 20 ML/HR: 9 INJECTION, SOLUTION INTRAVENOUS at 11:55

## 2023-01-27 RX ADMIN — SODIUM CHLORIDE, PRESERVATIVE FREE 10 ML: 5 INJECTION INTRAVENOUS at 11:47

## 2023-02-03 ENCOUNTER — HOSPITAL ENCOUNTER (OUTPATIENT)
Dept: INFUSION THERAPY | Age: 78
Setting detail: INFUSION SERIES
Discharge: HOME | End: 2023-02-03
Payer: MEDICARE

## 2023-02-03 VITALS
TEMPERATURE: 98.3 F | WEIGHT: 150 LBS | SYSTOLIC BLOOD PRESSURE: 186 MMHG | BODY MASS INDEX: 22.73 KG/M2 | HEART RATE: 54 BPM | HEIGHT: 68 IN | RESPIRATION RATE: 18 BRPM | OXYGEN SATURATION: 99 % | DIASTOLIC BLOOD PRESSURE: 79 MMHG

## 2023-02-03 DIAGNOSIS — D63.1 ANEMIA OF CHRONIC RENAL FAILURE, STAGE 5 (HCC): Primary | ICD-10-CM

## 2023-02-03 DIAGNOSIS — N18.5 ANEMIA OF CHRONIC RENAL FAILURE, STAGE 5 (HCC): Primary | ICD-10-CM

## 2023-02-03 PROCEDURE — 2580000003 HC RX 258: Performed by: INTERNAL MEDICINE

## 2023-02-03 PROCEDURE — 6360000002 HC RX W HCPCS: Performed by: INTERNAL MEDICINE

## 2023-02-03 PROCEDURE — 96365 THER/PROPH/DIAG IV INF INIT: CPT

## 2023-02-03 RX ORDER — SODIUM CHLORIDE 9 MG/ML
5-250 INJECTION, SOLUTION INTRAVENOUS PRN
Status: DISCONTINUED | OUTPATIENT
Start: 2023-02-03 | End: 2023-02-04 | Stop reason: HOSPADM

## 2023-02-03 RX ORDER — DIPHENHYDRAMINE HYDROCHLORIDE 50 MG/ML
50 INJECTION INTRAMUSCULAR; INTRAVENOUS
Status: CANCELLED | OUTPATIENT
Start: 2023-02-10

## 2023-02-03 RX ORDER — HEPARIN SODIUM (PORCINE) LOCK FLUSH IV SOLN 100 UNIT/ML 100 UNIT/ML
500 SOLUTION INTRAVENOUS PRN
Status: CANCELLED | OUTPATIENT
Start: 2023-02-10

## 2023-02-03 RX ORDER — SODIUM CHLORIDE 9 MG/ML
INJECTION, SOLUTION INTRAVENOUS CONTINUOUS
Status: CANCELLED | OUTPATIENT
Start: 2023-02-10

## 2023-02-03 RX ORDER — SODIUM CHLORIDE 0.9 % (FLUSH) 0.9 %
5-40 SYRINGE (ML) INJECTION PRN
Status: DISCONTINUED | OUTPATIENT
Start: 2023-02-03 | End: 2023-02-04 | Stop reason: HOSPADM

## 2023-02-03 RX ORDER — EPINEPHRINE 1 MG/ML
0.3 INJECTION, SOLUTION, CONCENTRATE INTRAVENOUS PRN
Status: CANCELLED | OUTPATIENT
Start: 2023-02-10

## 2023-02-03 RX ORDER — SODIUM CHLORIDE 0.9 % (FLUSH) 0.9 %
5-40 SYRINGE (ML) INJECTION PRN
Status: CANCELLED | OUTPATIENT
Start: 2023-02-10

## 2023-02-03 RX ORDER — SODIUM CHLORIDE 9 MG/ML
5-250 INJECTION, SOLUTION INTRAVENOUS PRN
Status: CANCELLED | OUTPATIENT
Start: 2023-02-10

## 2023-02-03 RX ADMIN — SODIUM CHLORIDE 125 MG: 900 INJECTION INTRAVENOUS at 11:40

## 2023-02-03 RX ADMIN — SODIUM CHLORIDE, PRESERVATIVE FREE 10 ML: 5 INJECTION INTRAVENOUS at 11:36

## 2023-02-03 RX ADMIN — SODIUM CHLORIDE, PRESERVATIVE FREE 10 ML: 5 INJECTION INTRAVENOUS at 12:41

## 2023-02-03 RX ADMIN — SODIUM CHLORIDE 20 ML/HR: 9 INJECTION, SOLUTION INTRAVENOUS at 11:38

## 2023-02-10 ENCOUNTER — HOSPITAL ENCOUNTER (OUTPATIENT)
Dept: INFUSION THERAPY | Age: 78
Setting detail: INFUSION SERIES
Discharge: HOME | End: 2023-02-10
Payer: MEDICARE

## 2023-02-10 VITALS
WEIGHT: 150 LBS | SYSTOLIC BLOOD PRESSURE: 140 MMHG | BODY MASS INDEX: 22.81 KG/M2 | DIASTOLIC BLOOD PRESSURE: 52 MMHG | OXYGEN SATURATION: 99 % | TEMPERATURE: 98.2 F | RESPIRATION RATE: 16 BRPM | HEART RATE: 58 BPM

## 2023-02-10 DIAGNOSIS — N18.5 ANEMIA OF CHRONIC RENAL FAILURE, STAGE 5 (HCC): Primary | ICD-10-CM

## 2023-02-10 DIAGNOSIS — D63.1 ANEMIA OF CHRONIC RENAL FAILURE, STAGE 5 (HCC): Primary | ICD-10-CM

## 2023-02-10 PROCEDURE — 6360000002 HC RX W HCPCS: Performed by: INTERNAL MEDICINE

## 2023-02-10 PROCEDURE — 96365 THER/PROPH/DIAG IV INF INIT: CPT

## 2023-02-10 PROCEDURE — 2580000003 HC RX 258: Performed by: INTERNAL MEDICINE

## 2023-02-10 RX ORDER — SODIUM CHLORIDE 9 MG/ML
5-250 INJECTION, SOLUTION INTRAVENOUS PRN
OUTPATIENT
Start: 2023-02-17

## 2023-02-10 RX ORDER — SODIUM CHLORIDE 0.9 % (FLUSH) 0.9 %
5-40 SYRINGE (ML) INJECTION PRN
OUTPATIENT
Start: 2023-02-17

## 2023-02-10 RX ORDER — SODIUM CHLORIDE 9 MG/ML
INJECTION, SOLUTION INTRAVENOUS CONTINUOUS
OUTPATIENT
Start: 2023-02-17

## 2023-02-10 RX ORDER — SODIUM CHLORIDE 0.9 % (FLUSH) 0.9 %
5-40 SYRINGE (ML) INJECTION PRN
Status: DISCONTINUED | OUTPATIENT
Start: 2023-02-10 | End: 2023-02-11 | Stop reason: HOSPADM

## 2023-02-10 RX ORDER — SODIUM CHLORIDE 9 MG/ML
5-250 INJECTION, SOLUTION INTRAVENOUS PRN
Status: DISCONTINUED | OUTPATIENT
Start: 2023-02-10 | End: 2023-02-11 | Stop reason: HOSPADM

## 2023-02-10 RX ORDER — EPINEPHRINE 1 MG/ML
0.3 INJECTION, SOLUTION, CONCENTRATE INTRAVENOUS PRN
OUTPATIENT
Start: 2023-02-17

## 2023-02-10 RX ORDER — HEPARIN SODIUM (PORCINE) LOCK FLUSH IV SOLN 100 UNIT/ML 100 UNIT/ML
500 SOLUTION INTRAVENOUS PRN
OUTPATIENT
Start: 2023-02-17

## 2023-02-10 RX ORDER — DIPHENHYDRAMINE HYDROCHLORIDE 50 MG/ML
50 INJECTION INTRAMUSCULAR; INTRAVENOUS
OUTPATIENT
Start: 2023-02-17

## 2023-02-10 RX ADMIN — SODIUM CHLORIDE 125 MG: 900 INJECTION INTRAVENOUS at 12:30

## 2023-02-10 RX ADMIN — SODIUM CHLORIDE, PRESERVATIVE FREE 10 ML: 5 INJECTION INTRAVENOUS at 12:23

## 2023-02-10 RX ADMIN — SODIUM CHLORIDE 20 ML/HR: 9 INJECTION, SOLUTION INTRAVENOUS at 12:26

## 2023-02-10 ASSESSMENT — PAIN DESCRIPTION - FREQUENCY: FREQUENCY: INTERMITTENT

## 2023-02-10 ASSESSMENT — PAIN SCALES - GENERAL: PAINLEVEL_OUTOF10: 0

## 2023-02-10 ASSESSMENT — PAIN DESCRIPTION - ONSET: ONSET: ON-GOING

## 2023-02-10 NOTE — PROGRESS NOTES
Tolerated infusion well. Reviewed therapy plan, offered education material and/or discharge material, reviewed medication information and signs and symptoms  and educated on possible side effects, verbalizes good knowledge of current plan patient verbalizes understanding, and has no signs or symptoms to report at this time. Patient discharged. Patient alert and oriented x3. No distress noted. Vital signs stable. Patient denies any new or worsening pain. Patient denies any needs. All questions answered. Next appointment scheduled. acceptedcopy of AVS. Patient stayed for 30 minute observation after completion of infusion.

## 2023-03-02 ENCOUNTER — TELEPHONE (OUTPATIENT)
Dept: SURGERY | Age: 78
End: 2023-03-02

## 2023-03-02 NOTE — TELEPHONE ENCOUNTER
MA called Dr Shanelle Garcia office to inform them that we have tried to reach patient to schedule an appointment for PD cath insertion, however, patient never returned call. Dr Nhan Colon got on the phone and explained that he was talking with patient at this time and patient is refusing PD catheter but will let us know if and when patient decides to have catheter placed.   Electronically signed by Yakov Mancia MA on 3/2/2023 at 10:19 AM

## 2023-04-18 ENCOUNTER — APPOINTMENT (OUTPATIENT)
Dept: CT IMAGING | Age: 78
End: 2023-04-18
Payer: MEDICARE

## 2023-04-18 ENCOUNTER — HOSPITAL ENCOUNTER (EMERGENCY)
Age: 78
Discharge: HOME | End: 2023-04-18
Attending: EMERGENCY MEDICINE
Payer: MEDICARE

## 2023-04-18 VITALS
DIASTOLIC BLOOD PRESSURE: 74 MMHG | BODY MASS INDEX: 21.52 KG/M2 | HEIGHT: 68 IN | RESPIRATION RATE: 18 BRPM | OXYGEN SATURATION: 96 % | TEMPERATURE: 98.4 F | WEIGHT: 142 LBS | HEART RATE: 63 BPM | SYSTOLIC BLOOD PRESSURE: 137 MMHG

## 2023-04-18 DIAGNOSIS — R55 SYNCOPE AND COLLAPSE: Primary | ICD-10-CM

## 2023-04-18 LAB
ALBUMIN SERPL-MCNC: 3.8 G/DL (ref 3.5–5.2)
ALP SERPL-CCNC: 67 U/L (ref 40–129)
ALT SERPL-CCNC: 11 U/L (ref 0–40)
ANION GAP SERPL CALCULATED.3IONS-SCNC: 11 MMOL/L (ref 7–16)
AST SERPL-CCNC: 19 U/L (ref 0–39)
BASOPHILS # BLD: 0.06 E9/L (ref 0–0.2)
BASOPHILS NFR BLD: 0.6 % (ref 0–2)
BILIRUB SERPL-MCNC: 0.2 MG/DL (ref 0–1.2)
BUN SERPL-MCNC: 24 MG/DL (ref 6–23)
CALCIUM SERPL-MCNC: 8.7 MG/DL (ref 8.6–10.2)
CHLORIDE SERPL-SCNC: 97 MMOL/L (ref 98–107)
CO2 SERPL-SCNC: 27 MMOL/L (ref 22–29)
CREAT SERPL-MCNC: 2.3 MG/DL (ref 0.7–1.2)
EOSINOPHIL # BLD: 0.12 E9/L (ref 0.05–0.5)
EOSINOPHIL NFR BLD: 1.2 % (ref 0–6)
ERYTHROCYTE [DISTWIDTH] IN BLOOD BY AUTOMATED COUNT: 17.3 FL (ref 11.5–15)
GLUCOSE SERPL-MCNC: 115 MG/DL (ref 74–99)
HCT VFR BLD AUTO: 33.3 % (ref 37–54)
HGB BLD-MCNC: 10.1 G/DL (ref 12.5–16.5)
IMM GRANULOCYTES # BLD: 0.08 E9/L
IMM GRANULOCYTES NFR BLD: 0.8 % (ref 0–5)
LYMPHOCYTES # BLD: 1.12 E9/L (ref 1.5–4)
LYMPHOCYTES NFR BLD: 11.3 % (ref 20–42)
MAGNESIUM SERPL-MCNC: 2.3 MG/DL (ref 1.6–2.6)
MCH RBC QN AUTO: 29.4 PG (ref 26–35)
MCHC RBC AUTO-ENTMCNC: 30.3 % (ref 32–34.5)
MCV RBC AUTO: 96.8 FL (ref 80–99.9)
MONOCYTES # BLD: 0.79 E9/L (ref 0.1–0.95)
MONOCYTES NFR BLD: 8 % (ref 2–12)
NEUTROPHILS # BLD: 7.71 E9/L (ref 1.8–7.3)
NEUTS SEG NFR BLD: 78.1 % (ref 43–80)
PLATELET # BLD AUTO: 171 E9/L (ref 130–450)
PMV BLD AUTO: 10.3 FL (ref 7–12)
POTASSIUM SERPL-SCNC: 4 MMOL/L (ref 3.5–5)
PROT SERPL-MCNC: 6.6 G/DL (ref 6.4–8.3)
RBC # BLD AUTO: 3.44 E12/L (ref 3.8–5.8)
SODIUM SERPL-SCNC: 135 MMOL/L (ref 132–146)
TROPONIN, HIGH SENSITIVITY: 68 NG/L (ref 0–11)
TROPONIN, HIGH SENSITIVITY: 69 NG/L (ref 0–11)
WBC # BLD: 9.9 E9/L (ref 4.5–11.5)

## 2023-04-18 PROCEDURE — 70450 CT HEAD/BRAIN W/O DYE: CPT

## 2023-04-18 PROCEDURE — 84484 ASSAY OF TROPONIN QUANT: CPT

## 2023-04-18 PROCEDURE — 72125 CT NECK SPINE W/O DYE: CPT

## 2023-04-18 PROCEDURE — 85025 COMPLETE CBC W/AUTO DIFF WBC: CPT

## 2023-04-18 PROCEDURE — 80053 COMPREHEN METABOLIC PANEL: CPT

## 2023-04-18 PROCEDURE — 93005 ELECTROCARDIOGRAM TRACING: CPT

## 2023-04-18 PROCEDURE — 99284 EMERGENCY DEPT VISIT MOD MDM: CPT

## 2023-04-18 PROCEDURE — 83735 ASSAY OF MAGNESIUM: CPT

## 2023-04-18 PROCEDURE — 36415 COLL VENOUS BLD VENIPUNCTURE: CPT

## 2023-04-18 ASSESSMENT — PAIN DESCRIPTION - PAIN TYPE: TYPE: ACUTE PAIN

## 2023-04-18 ASSESSMENT — PAIN - FUNCTIONAL ASSESSMENT: PAIN_FUNCTIONAL_ASSESSMENT: 0-10

## 2023-04-18 ASSESSMENT — PAIN DESCRIPTION - ORIENTATION: ORIENTATION: POSTERIOR

## 2023-04-18 ASSESSMENT — LIFESTYLE VARIABLES
HOW MANY STANDARD DRINKS CONTAINING ALCOHOL DO YOU HAVE ON A TYPICAL DAY: 1 OR 2
HOW OFTEN DO YOU HAVE A DRINK CONTAINING ALCOHOL: MONTHLY OR LESS

## 2023-04-18 ASSESSMENT — PAIN DESCRIPTION - LOCATION: LOCATION: NECK

## 2023-04-18 ASSESSMENT — PAIN DESCRIPTION - DESCRIPTORS: DESCRIPTORS: DISCOMFORT

## 2023-04-18 ASSESSMENT — PAIN SCALES - GENERAL: PAINLEVEL_OUTOF10: 6

## 2023-04-18 NOTE — ED PROVIDER NOTES
There is no epidural or subdural hematoma. There is no signs of ischemia. Patient was signed out to my co-resident. Pending negative delta troponin patient will be stable for discharge. ED Course as of 04/19/23 0006   Tue Apr 18, 2023   1847 I agree with the EKG findings as dictated by the resident. ATTENDING PROVIDER ATTESTATION:     I have personally performed and/or participated in the history, exam, medical decision making, and procedures and agree with all pertinent clinical information. I have also reviewed and agree with the past medical, family and social history unless otherwise noted. I have discussed this patient in detail with the resident, and provided the instruction and education regarding syncopal episode while standing at the counter [facility] dialysis. Was likely orthostatic in nature. We will do assessment and reevaluate for disposition possibly to. [RM]      ED Course User Index  [RM] Mae Favre, DO          Chronic Conditions: Listed above    Records Reviewed: treatment note from 4/13/23    CONSULTS: (Who and What was discussed)  None      FINAL IMPRESSION      1.  Syncope and collapse          DISPOSITION/PLAN     DISPOSITION Decision To Discharge 04/18/2023 09:13:47 PM      PATIENT REFERRED TO:  Verónica Herron MD  [facility] [address] [place] [address]  [phone]    Schedule an appointment as soon as possible for a visit         DISCHARGE MEDICATIONS:  Discharge Medication List as of 4/18/2023  9:14 PM               (Please note that portions of this note were completed with a voice recognition program.  Efforts were made to edit the dictations but occasionally words are mis-transcribed.)    Ned Briggs DO (electronically signed)           Ned Briggs DO  Resident  04/21/23 7661

## 2023-04-19 LAB
EKG ATRIAL RATE: 59 BPM
EKG P AXIS: 82 DEGREES
EKG P-R INTERVAL: 196 MS
EKG Q-T INTERVAL: 548 MS
EKG QRS DURATION: 72 MS
EKG QTC CALCULATION (BAZETT): 542 MS
EKG R AXIS: 20 DEGREES
EKG T AXIS: 39 DEGREES
EKG VENTRICULAR RATE: 59 BPM

## 2023-04-19 PROCEDURE — 93010 ELECTROCARDIOGRAM REPORT: CPT | Performed by: INTERNAL MEDICINE

## 2023-04-19 NOTE — ED NOTES
Patient ambulated to bathroom by tech. Tolerated well.       Kieran Wright, [facility]  04/18/23 2120 decreased strength/impaired balance

## 2023-05-01 ENCOUNTER — HOSPITAL ENCOUNTER (OUTPATIENT)
Dept: ULTRASOUND IMAGING | Age: 78
Discharge: HOME | End: 2023-05-03
Payer: MEDICARE

## 2023-05-01 DIAGNOSIS — R60.1 GENERALIZED EDEMA: ICD-10-CM

## 2023-05-01 DIAGNOSIS — N18.9 CHRONIC KIDNEY DISEASE, UNSPECIFIED CKD STAGE: ICD-10-CM

## 2023-05-01 PROCEDURE — 93970 EXTREMITY STUDY: CPT

## 2023-05-12 ENCOUNTER — TELEPHONE (OUTPATIENT)
Dept: VASCULAR SURGERY | Age: 78
End: 2023-05-12

## 2023-05-12 NOTE — TELEPHONE ENCOUNTER
Received referral from Dr. Amos Bender regarding AVF/AVG creation, left message for patient to return call.

## 2023-05-27 ENCOUNTER — HOSPITAL ENCOUNTER (EMERGENCY)
Age: 78
Discharge: HOME | End: 2023-05-27
Attending: EMERGENCY MEDICINE
Payer: MEDICARE

## 2023-05-27 ENCOUNTER — APPOINTMENT (OUTPATIENT)
Dept: CT IMAGING | Age: 78
End: 2023-05-27
Payer: MEDICARE

## 2023-05-27 VITALS
RESPIRATION RATE: 14 BRPM | BODY MASS INDEX: 20.73 KG/M2 | HEIGHT: 69 IN | SYSTOLIC BLOOD PRESSURE: 99 MMHG | HEART RATE: 77 BPM | WEIGHT: 140 LBS | DIASTOLIC BLOOD PRESSURE: 63 MMHG | OXYGEN SATURATION: 99 % | TEMPERATURE: 98 F

## 2023-05-27 DIAGNOSIS — R55 SYNCOPE AND COLLAPSE: Primary | ICD-10-CM

## 2023-05-27 LAB
ALBUMIN SERPL-MCNC: 3.9 G/DL (ref 3.5–5.2)
ALP SERPL-CCNC: 60 U/L (ref 40–129)
ALT SERPL-CCNC: 11 U/L (ref 0–40)
ANION GAP SERPL CALCULATED.3IONS-SCNC: 12 MMOL/L (ref 7–16)
AST SERPL-CCNC: 18 U/L (ref 0–39)
BASOPHILS # BLD: 0.06 E9/L (ref 0–0.2)
BASOPHILS NFR BLD: 0.7 % (ref 0–2)
BILIRUB SERPL-MCNC: 0.3 MG/DL (ref 0–1.2)
BUN SERPL-MCNC: 19 MG/DL (ref 6–23)
CALCIUM SERPL-MCNC: 8.8 MG/DL (ref 8.6–10.2)
CHLORIDE SERPL-SCNC: 96 MMOL/L (ref 98–107)
CO2 SERPL-SCNC: 28 MMOL/L (ref 22–29)
CREAT SERPL-MCNC: 2.8 MG/DL (ref 0.7–1.2)
EOSINOPHIL # BLD: 0.15 E9/L (ref 0.05–0.5)
EOSINOPHIL NFR BLD: 1.7 % (ref 0–6)
ERYTHROCYTE [DISTWIDTH] IN BLOOD BY AUTOMATED COUNT: 14.7 FL (ref 11.5–15)
GLUCOSE SERPL-MCNC: 102 MG/DL (ref 74–99)
HCT VFR BLD AUTO: 42.1 % (ref 37–54)
HGB BLD-MCNC: 13.4 G/DL (ref 12.5–16.5)
IMM GRANULOCYTES # BLD: 0.04 E9/L
IMM GRANULOCYTES NFR BLD: 0.4 % (ref 0–5)
LYMPHOCYTES # BLD: 2.11 E9/L (ref 1.5–4)
LYMPHOCYTES NFR BLD: 23.7 % (ref 20–42)
MCH RBC QN AUTO: 29.8 PG (ref 26–35)
MCHC RBC AUTO-ENTMCNC: 31.8 % (ref 32–34.5)
MCV RBC AUTO: 93.8 FL (ref 80–99.9)
MONOCYTES # BLD: 0.74 E9/L (ref 0.1–0.95)
MONOCYTES NFR BLD: 8.3 % (ref 2–12)
NEUTROPHILS # BLD: 5.82 E9/L (ref 1.8–7.3)
NEUTS SEG NFR BLD: 65.2 % (ref 43–80)
PLATELET # BLD AUTO: 217 E9/L (ref 130–450)
PMV BLD AUTO: 11 FL (ref 7–12)
POTASSIUM SERPL-SCNC: 4.4 MMOL/L (ref 3.5–5)
PROT SERPL-MCNC: 6.7 G/DL (ref 6.4–8.3)
RBC # BLD AUTO: 4.49 E12/L (ref 3.8–5.8)
REASON FOR REJECTION: NORMAL
REJECTED TEST: NORMAL
SODIUM SERPL-SCNC: 136 MMOL/L (ref 132–146)
TROPONIN, HIGH SENSITIVITY: 85 NG/L (ref 0–11)
TROPONIN, HIGH SENSITIVITY: 87 NG/L (ref 0–11)
WBC # BLD: 8.9 E9/L (ref 4.5–11.5)

## 2023-05-27 PROCEDURE — 85025 COMPLETE CBC W/AUTO DIFF WBC: CPT

## 2023-05-27 PROCEDURE — 84484 ASSAY OF TROPONIN QUANT: CPT

## 2023-05-27 PROCEDURE — 80053 COMPREHEN METABOLIC PANEL: CPT

## 2023-05-27 PROCEDURE — 2580000003 HC RX 258: Performed by: EMERGENCY MEDICINE

## 2023-05-27 PROCEDURE — 93005 ELECTROCARDIOGRAM TRACING: CPT | Performed by: EMERGENCY MEDICINE

## 2023-05-27 PROCEDURE — 70450 CT HEAD/BRAIN W/O DYE: CPT

## 2023-05-27 PROCEDURE — 99284 EMERGENCY DEPT VISIT MOD MDM: CPT

## 2023-05-27 RX ORDER — 0.9 % SODIUM CHLORIDE 0.9 %
500 INTRAVENOUS SOLUTION INTRAVENOUS ONCE
Status: COMPLETED | OUTPATIENT
Start: 2023-05-27 | End: 2023-05-27

## 2023-05-27 RX ADMIN — SODIUM CHLORIDE 500 ML: 9 INJECTION, SOLUTION INTRAVENOUS at 18:13

## 2023-05-27 ASSESSMENT — PAIN - FUNCTIONAL ASSESSMENT: PAIN_FUNCTIONAL_ASSESSMENT: NONE - DENIES PAIN

## 2023-05-27 ASSESSMENT — LIFESTYLE VARIABLES
HOW OFTEN DO YOU HAVE A DRINK CONTAINING ALCOHOL: NEVER
HOW MANY STANDARD DRINKS CONTAINING ALCOHOL DO YOU HAVE ON A TYPICAL DAY: PATIENT DOES NOT DRINK

## 2023-05-28 LAB
EKG ATRIAL RATE: 68 BPM
EKG P AXIS: 65 DEGREES
EKG P-R INTERVAL: 198 MS
EKG Q-T INTERVAL: 456 MS
EKG QRS DURATION: 70 MS
EKG QTC CALCULATION (BAZETT): 484 MS
EKG R AXIS: 29 DEGREES
EKG T AXIS: 67 DEGREES
EKG VENTRICULAR RATE: 68 BPM

## 2023-05-28 PROCEDURE — 93010 ELECTROCARDIOGRAM REPORT: CPT | Performed by: INTERNAL MEDICINE

## 2023-06-26 ENCOUNTER — PREP FOR PROCEDURE (OUTPATIENT)
Dept: VASCULAR SURGERY | Age: 78
End: 2023-06-26

## 2023-06-26 ENCOUNTER — OFFICE VISIT (OUTPATIENT)
Dept: VASCULAR SURGERY | Age: 78
End: 2023-06-26
Payer: MEDICARE

## 2023-06-26 DIAGNOSIS — N18.6 ENCOUNTER REGARDING VASCULAR ACCESS FOR DIALYSIS FOR ESRD (HCC): Primary | ICD-10-CM

## 2023-06-26 DIAGNOSIS — Z99.2 ENCOUNTER REGARDING VASCULAR ACCESS FOR DIALYSIS FOR ESRD (HCC): Primary | ICD-10-CM

## 2023-06-26 PROCEDURE — 99204 OFFICE O/P NEW MOD 45 MIN: CPT | Performed by: SURGERY

## 2023-06-26 PROCEDURE — 1124F ACP DISCUSS-NO DSCNMKR DOCD: CPT | Performed by: SURGERY

## 2023-07-05 ENCOUNTER — HOSPITAL ENCOUNTER (OUTPATIENT)
Dept: SLEEP CENTER | Age: 78
Discharge: HOME | End: 2023-07-05
Payer: MEDICARE

## 2023-07-05 PROCEDURE — 93242 EXT ECG>48HR<7D RECORDING: CPT

## 2023-07-06 ENCOUNTER — TELEPHONE (OUTPATIENT)
Dept: VASCULAR SURGERY | Age: 78
End: 2023-07-06

## 2023-07-13 ENCOUNTER — ANESTHESIA EVENT (OUTPATIENT)
Dept: OPERATING ROOM | Age: 78
End: 2023-07-13
Payer: MEDICARE

## 2023-07-14 ENCOUNTER — HOSPITAL ENCOUNTER (OUTPATIENT)
Age: 78
Setting detail: OUTPATIENT SURGERY
Discharge: HOME | End: 2023-07-14
Attending: SURGERY | Admitting: SURGERY
Payer: MEDICARE

## 2023-07-14 ENCOUNTER — ANESTHESIA (OUTPATIENT)
Dept: OPERATING ROOM | Age: 78
End: 2023-07-14
Payer: MEDICARE

## 2023-07-14 VITALS
TEMPERATURE: 97 F | BODY MASS INDEX: 21.33 KG/M2 | OXYGEN SATURATION: 100 % | DIASTOLIC BLOOD PRESSURE: 45 MMHG | SYSTOLIC BLOOD PRESSURE: 127 MMHG | WEIGHT: 144 LBS | HEIGHT: 69 IN | HEART RATE: 60 BPM | RESPIRATION RATE: 14 BRPM

## 2023-07-14 DIAGNOSIS — Z01.812 PRE-OPERATIVE LABORATORY EXAMINATION: ICD-10-CM

## 2023-07-14 DIAGNOSIS — N18.6 ENCOUNTER REGARDING VASCULAR ACCESS FOR DIALYSIS FOR ESRD (HCC): Primary | ICD-10-CM

## 2023-07-14 DIAGNOSIS — Z99.2 ENCOUNTER REGARDING VASCULAR ACCESS FOR DIALYSIS FOR ESRD (HCC): Primary | ICD-10-CM

## 2023-07-14 LAB
ABO + RH BLD: NORMAL
ANION GAP SERPL CALCULATED.3IONS-SCNC: 11 MMOL/L (ref 7–16)
BLD GP AB SCN SERPL QL: NORMAL
BUN SERPL-MCNC: 28 MG/DL (ref 6–23)
CALCIUM SERPL-MCNC: 9.2 MG/DL (ref 8.6–10.2)
CHLORIDE SERPL-SCNC: 93 MMOL/L (ref 98–107)
CO2 SERPL-SCNC: 32 MMOL/L (ref 22–29)
CREAT SERPL-MCNC: 4.1 MG/DL (ref 0.7–1.2)
ERYTHROCYTE [DISTWIDTH] IN BLOOD BY AUTOMATED COUNT: 14.7 FL (ref 11.5–15)
GLUCOSE SERPL-MCNC: 98 MG/DL (ref 74–99)
HCT VFR BLD AUTO: 41.7 % (ref 37–54)
HGB BLD-MCNC: 13.2 G/DL (ref 12.5–16.5)
INR BLD: 1
MCH RBC QN AUTO: 30.9 PG (ref 26–35)
MCHC RBC AUTO-ENTMCNC: 31.7 % (ref 32–34.5)
MCV RBC AUTO: 97.7 FL (ref 80–99.9)
PLATELET # BLD AUTO: 157 E9/L (ref 130–450)
PMV BLD AUTO: 10.6 FL (ref 7–12)
POTASSIUM SERPL-SCNC: 3.9 MMOL/L (ref 3.5–5)
PROTHROMBIN TIME: 11.4 SEC (ref 9.3–12.4)
RBC # BLD AUTO: 4.27 E12/L (ref 3.8–5.8)
SODIUM SERPL-SCNC: 136 MMOL/L (ref 132–146)
WBC # BLD: 9.4 E9/L (ref 4.5–11.5)

## 2023-07-14 PROCEDURE — 6360000002 HC RX W HCPCS: Performed by: INTERNAL MEDICINE

## 2023-07-14 PROCEDURE — 3600000012 HC SURGERY LEVEL 2 ADDTL 15MIN: Performed by: SURGERY

## 2023-07-14 PROCEDURE — 6360000002 HC RX W HCPCS: Performed by: SURGERY

## 2023-07-14 PROCEDURE — 7100000011 HC PHASE II RECOVERY - ADDTL 15 MIN: Performed by: SURGERY

## 2023-07-14 PROCEDURE — 2580000003 HC RX 258: Performed by: INTERNAL MEDICINE

## 2023-07-14 PROCEDURE — 6360000002 HC RX W HCPCS: Performed by: ANESTHESIOLOGY

## 2023-07-14 PROCEDURE — 86901 BLOOD TYPING SEROLOGIC RH(D): CPT

## 2023-07-14 PROCEDURE — 2500000003 HC RX 250 WO HCPCS: Performed by: INTERNAL MEDICINE

## 2023-07-14 PROCEDURE — 36415 COLL VENOUS BLD VENIPUNCTURE: CPT

## 2023-07-14 PROCEDURE — 64415 NJX AA&/STRD BRCH PLXS IMG: CPT | Performed by: ANESTHESIOLOGY

## 2023-07-14 PROCEDURE — 3700000001 HC ADD 15 MINUTES (ANESTHESIA): Performed by: SURGERY

## 2023-07-14 PROCEDURE — 3700000000 HC ANESTHESIA ATTENDED CARE: Performed by: SURGERY

## 2023-07-14 PROCEDURE — A4217 STERILE WATER/SALINE, 500 ML: HCPCS | Performed by: SURGERY

## 2023-07-14 PROCEDURE — 86900 BLOOD TYPING SEROLOGIC ABO: CPT

## 2023-07-14 PROCEDURE — 85027 COMPLETE CBC AUTOMATED: CPT

## 2023-07-14 PROCEDURE — 86850 RBC ANTIBODY SCREEN: CPT

## 2023-07-14 PROCEDURE — 3600000002 HC SURGERY LEVEL 2 BASE: Performed by: SURGERY

## 2023-07-14 PROCEDURE — 2709999900 HC NON-CHARGEABLE SUPPLY: Performed by: SURGERY

## 2023-07-14 PROCEDURE — 2580000003 HC RX 258: Performed by: PHYSICIAN ASSISTANT

## 2023-07-14 PROCEDURE — 85610 PROTHROMBIN TIME: CPT

## 2023-07-14 PROCEDURE — 6360000002 HC RX W HCPCS: Performed by: PHYSICIAN ASSISTANT

## 2023-07-14 PROCEDURE — 80048 BASIC METABOLIC PNL TOTAL CA: CPT

## 2023-07-14 PROCEDURE — 2580000003 HC RX 258: Performed by: SURGERY

## 2023-07-14 PROCEDURE — 7100000010 HC PHASE II RECOVERY - FIRST 15 MIN: Performed by: SURGERY

## 2023-07-14 RX ORDER — SODIUM CHLORIDE 0.9 % (FLUSH) 0.9 %
5-40 SYRINGE (ML) INJECTION EVERY 12 HOURS SCHEDULED
Status: DISCONTINUED | OUTPATIENT
Start: 2023-07-14 | End: 2023-07-14 | Stop reason: HOSPADM

## 2023-07-14 RX ORDER — OXYCODONE HYDROCHLORIDE AND ACETAMINOPHEN 5; 325 MG/1; MG/1
1 TABLET ORAL EVERY 6 HOURS PRN
Qty: 28 TABLET | Refills: 0 | Status: SHIPPED | OUTPATIENT
Start: 2023-07-14 | End: 2023-07-21

## 2023-07-14 RX ORDER — MIDAZOLAM HYDROCHLORIDE 2 MG/2ML
2 INJECTION, SOLUTION INTRAMUSCULAR; INTRAVENOUS ONCE
Status: COMPLETED | OUTPATIENT
Start: 2023-07-14 | End: 2023-07-14

## 2023-07-14 RX ORDER — SODIUM CHLORIDE 9 MG/ML
INJECTION, SOLUTION INTRAVENOUS PRN
Status: DISCONTINUED | OUTPATIENT
Start: 2023-07-14 | End: 2023-07-14 | Stop reason: HOSPADM

## 2023-07-14 RX ORDER — SODIUM CHLORIDE 9 MG/ML
INJECTION, SOLUTION INTRAVENOUS CONTINUOUS PRN
Status: DISCONTINUED | OUTPATIENT
Start: 2023-07-14 | End: 2023-07-14 | Stop reason: SDUPTHER

## 2023-07-14 RX ORDER — ROPIVACAINE HYDROCHLORIDE 5 MG/ML
25 INJECTION, SOLUTION EPIDURAL; INFILTRATION; PERINEURAL ONCE
Status: COMPLETED | OUTPATIENT
Start: 2023-07-14 | End: 2023-07-14

## 2023-07-14 RX ORDER — ONDANSETRON 2 MG/ML
4 INJECTION INTRAMUSCULAR; INTRAVENOUS
Status: DISCONTINUED | OUTPATIENT
Start: 2023-07-14 | End: 2023-07-14 | Stop reason: HOSPADM

## 2023-07-14 RX ORDER — ONDANSETRON 2 MG/ML
INJECTION INTRAMUSCULAR; INTRAVENOUS PRN
Status: DISCONTINUED | OUTPATIENT
Start: 2023-07-14 | End: 2023-07-14 | Stop reason: SDUPTHER

## 2023-07-14 RX ORDER — SODIUM CHLORIDE 0.9 % (FLUSH) 0.9 %
5-40 SYRINGE (ML) INJECTION PRN
Status: DISCONTINUED | OUTPATIENT
Start: 2023-07-14 | End: 2023-07-14 | Stop reason: HOSPADM

## 2023-07-14 RX ORDER — ROPIVACAINE HYDROCHLORIDE 5 MG/ML
INJECTION, SOLUTION EPIDURAL; INFILTRATION; PERINEURAL
Status: DISCONTINUED | OUTPATIENT
Start: 2023-07-14 | End: 2023-07-14 | Stop reason: SDUPTHER

## 2023-07-14 RX ORDER — HYDROMORPHONE HYDROCHLORIDE 1 MG/ML
0.5 INJECTION, SOLUTION INTRAMUSCULAR; INTRAVENOUS; SUBCUTANEOUS EVERY 5 MIN PRN
Status: DISCONTINUED | OUTPATIENT
Start: 2023-07-14 | End: 2023-07-14 | Stop reason: HOSPADM

## 2023-07-14 RX ORDER — PHENYLEPHRINE HCL IN 0.9% NACL 1 MG/10 ML
SYRINGE (ML) INTRAVENOUS PRN
Status: DISCONTINUED | OUTPATIENT
Start: 2023-07-14 | End: 2023-07-14 | Stop reason: SDUPTHER

## 2023-07-14 RX ORDER — HEPARIN SODIUM 1000 [USP'U]/ML
INJECTION, SOLUTION INTRAVENOUS; SUBCUTANEOUS PRN
Status: DISCONTINUED | OUTPATIENT
Start: 2023-07-14 | End: 2023-07-14 | Stop reason: SDUPTHER

## 2023-07-14 RX ORDER — EPHEDRINE SULFATE/0.9% NACL/PF 50 MG/5 ML
SYRINGE (ML) INTRAVENOUS PRN
Status: DISCONTINUED | OUTPATIENT
Start: 2023-07-14 | End: 2023-07-14 | Stop reason: SDUPTHER

## 2023-07-14 RX ORDER — HYDROMORPHONE HYDROCHLORIDE 1 MG/ML
0.25 INJECTION, SOLUTION INTRAMUSCULAR; INTRAVENOUS; SUBCUTANEOUS EVERY 5 MIN PRN
Status: DISCONTINUED | OUTPATIENT
Start: 2023-07-14 | End: 2023-07-14 | Stop reason: HOSPADM

## 2023-07-14 RX ORDER — PROPOFOL 10 MG/ML
INJECTION, EMULSION INTRAVENOUS CONTINUOUS PRN
Status: DISCONTINUED | OUTPATIENT
Start: 2023-07-14 | End: 2023-07-14 | Stop reason: SDUPTHER

## 2023-07-14 RX ORDER — SODIUM CHLORIDE 9 MG/ML
INJECTION, SOLUTION INTRAVENOUS CONTINUOUS
Status: DISCONTINUED | OUTPATIENT
Start: 2023-07-14 | End: 2023-07-14 | Stop reason: HOSPADM

## 2023-07-14 RX ADMIN — ONDANSETRON HYDROCHLORIDE 4 MG: 2 SOLUTION INTRAMUSCULAR; INTRAVENOUS at 07:48

## 2023-07-14 RX ADMIN — Medication 10 MG: at 08:17

## 2023-07-14 RX ADMIN — Medication 150 MCG: at 08:13

## 2023-07-14 RX ADMIN — CEFAZOLIN 2000 MG: 2 INJECTION, POWDER, FOR SOLUTION INTRAMUSCULAR; INTRAVENOUS at 07:48

## 2023-07-14 RX ADMIN — SODIUM CHLORIDE: 9 INJECTION, SOLUTION INTRAVENOUS at 07:23

## 2023-07-14 RX ADMIN — Medication 100 MCG: at 08:04

## 2023-07-14 RX ADMIN — ROPIVACAINE HYDROCHLORIDE 25 ML: 5 INJECTION EPIDURAL; INFILTRATION; PERINEURAL at 07:10

## 2023-07-14 RX ADMIN — PROPOFOL 150 MCG/KG/MIN: 10 INJECTION, EMULSION INTRAVENOUS at 07:42

## 2023-07-14 RX ADMIN — MIDAZOLAM 1 MG: 1 INJECTION INTRAMUSCULAR; INTRAVENOUS at 07:11

## 2023-07-14 RX ADMIN — ROPIVACAINE HYDROCHLORIDE 25 ML: 5 INJECTION EPIDURAL; INFILTRATION; PERINEURAL at 07:13

## 2023-07-14 RX ADMIN — HEPARIN SODIUM 7000 UNITS: 1000 INJECTION INTRAVENOUS; SUBCUTANEOUS at 08:10

## 2023-07-14 ASSESSMENT — PAIN - FUNCTIONAL ASSESSMENT: PAIN_FUNCTIONAL_ASSESSMENT: NONE - DENIES PAIN

## 2023-07-14 NOTE — ANESTHESIA PROCEDURE NOTES
Peripheral Block    Patient location during procedure: procedure area  Reason for block: post-op pain management and at surgeon's request  Start time: 7/14/2023 7:10 AM  End time: 7/14/2023 7:20 AM  Staffing  Performed: anesthesiologist   Anesthesiologist: Baldev Ritter MD  Preanesthetic Checklist  Completed: patient identified, IV checked, site marked, risks and benefits discussed, surgical/procedural consents, equipment checked, pre-op evaluation, timeout performed, anesthesia consent given, oxygen available and monitors applied/VS acknowledged  Peripheral Block   Patient position: supine  Prep: alcohol swabs  Provider prep: mask and sterile gloves  Patient monitoring: cardiac monitor, continuous pulse ox, frequent blood pressure checks, IV access, oxygen and responsive to questions  Block type: Brachial plexus  Supraclavicular  Laterality: left  Injection technique: single-shot  Guidance: ultrasound guided  Infiltration strength: 0 %  Dose: 0 mL    Needle   Needle type: short-bevel   Needle gauge: 22 G  Needle localization: ultrasound guidance  Needle length: 8 cm  Assessment   Injection assessment: negative aspiration for heme, no paresthesia on injection and local visualized surrounding nerve on ultrasound  Paresthesia pain: none  Slow fractionated injection: yes  Hemodynamics: stable  Real-time US image taken/store: yes  Outcomes: uncomplicated    Additional Notes  Mixture of 0.5% Ropivacaine 25 mL  injected in incremental doses of 5 mL after negative blood aspiration.   Medications Administered  ropivacaine (NAROPIN) injection 0.5% - Perineural   25 mL - 7/14/2023 7:10:00 AM

## 2023-07-14 NOTE — H&P
Vascular Surgery History & Physical      HPI :    Jonathon Chan is a 76 y.o. male who presents for insertion of AVG of left arm. Patient is right hand dominant. He had R IJ tunn hd cath placed in 3/2023 at [facility] and was started on dialysis at that time. He has no hx of pacemaker, defibrillator. He has no other previous long term tunneled catheters in past.  Primary etiology of ESRD is related to uncontrolled HTN. This procedure has been fully reviewed with the patient and written informed consent has been obtained. Patient has no concerns or further questions at this time.       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      ROS : Negative if blank [], Positive if [x]  General Vascular   [] Fevers [] Claudication (Blocks)   [] Chills [] Rest Pain   [] Weight Loss [] Tissue Loss   [] Chest Pain [] Clotting Disorder   [] SOB at rest [] Leg Swelling   [] SOB with exertion [] DVT/PE      [] Nausea    [] Vomitting [] Stroke/TIA   [] Abdominal Pain [] Focal weakness   [] Melena [] Slurred Speech   [] Hematochezia [] Vision Changes   [] Hematuria    [] Dysuria [] Hx of Central Catheters   [] Wears Glasses/Contacts  [] Dialysis and If so date initiated   [] Blindness     [] Right Hand Dominant   [] Difficulty swallowing        Past Medical History:   Diagnosis Date    Aneurysm of abdominal aorta ([address]) 9/27/2012    Deep vein thrombosis of calf (HCC)     R calf vein thrombosis involving post tibial vein with extension in close proximity  to popliteal vein    Gout     History of DVT (deep vein thrombosis) 3/12/2015    History of tobacco use 7/27/2017    Hypertension     Iliac artery aneurysm, bilateral ([address]) 6/29/2017    Loss of weight     diet and exercise    Right leg DVT ([address]) 3/12/2015        Past Surgical History:   Procedure Laterality Date    ABDOMINAL AORTIC ANEURYSM REPAIR      2018    HERNIA REPAIR      OPEN REPAIR PERIPHERAL ANEURYSM Bilateral 2018

## 2023-07-14 NOTE — OP NOTE
Operative Note      Patient: Katarina Carreon  YOB: 1945  MRN: [id number]    Date of Procedure: 7/14/2023    Pre-Op Diagnosis Codes:     * End stage renal disease ([address]) [N18.6]    Post-Op Diagnosis: Same       Procedure   L RC AVF    Surgeon(s):  Rafa Craft MD    Assistant:   Surgical Assistant: David Foster  Resident: Bandar Sky DO    Anesthesia: Regional    Estimated Blood Loss (mL): Minimal    Complications: None    DESCRIPTION OF PROCEDURE: The patient was identified and the procedure was confirmed. The left arm was prepped and draped in the usual sterile fashion. Lidocaine 1% mixed with 0.25% Marcaine was used for local anesthesia. A longitudinal skin incision was made over the lateral forearm and carried down through the subcutaneous tissue. The cephalic vein identified and dissected free from the surrounding tissues. The vein appeared to be good quality for the fistula. It was marked for orientation and branches were divided between silk ties. It was ligated distally and divided. Medially, the radial artery was identified and freed from the surrounding tissues. It was surrounded proximally and distally with vessel loops for control. The patient was heparinized and the artery was clamped proximally and distally. A longitudinal arteriotomy measuring 5 mm was made and the vein was cut to the appropriate length and spatulated and anastomosed to the side of the artery using a running 7-0 Prolene suture. After completing the anastomosis, the clamps were released and a thrill was appreciated through the fistula. A radial pulse, ulnar and palmar biphasic signal was appreciated distal in the wrist. Hemostasis was obtained and the incision was irrigated with saline solution. The incision was approximated with Vicryl suture and Dermabond was applied to the incisions in the operating room. Needle, sponge and instrument counts were reported as correct x2.  The patient tolerated the procedure

## 2023-07-14 NOTE — ANESTHESIA POSTPROCEDURE EVALUATION
Department of Anesthesiology  Postprocedure Note    Patient: Edilson Balderas  MRN: [id number]  YOB: 1945  Date of evaluation: 7/14/2023      Procedure Summary     Date: 07/14/23 Room / Location: [facility] OR 04 / [facility]    Anesthesia Start: 3901 Anesthesia Stop: 4291    Procedure: insertion AVF left arm (Left: Arm Lower) Diagnosis:       End stage renal disease ([address])      (End stage renal disease ([address]) [N18.6])    Surgeons: Reyna Christensen MD Responsible Provider: Dany Woodruff MD    Anesthesia Type: MAC, regional ASA Status: 4          Anesthesia Type: No value filed.     Becky Phase I: Becky Score: 10    Becky Phase II: Becky Score: 9      Anesthesia Post Evaluation    Patient location during evaluation: PACU  Patient participation: complete - patient participated  Level of consciousness: awake  Pain score: 0  Airway patency: patent  Nausea & Vomiting: no nausea  Complications: no  Cardiovascular status: hemodynamically stable  Respiratory status: acceptable  Hydration status: stable  Multimodal analgesia pain management approach

## 2023-07-14 NOTE — PROGRESS NOTES
CLINICAL PHARMACY NOTE: MEDS TO BEDS    Total # of Prescriptions Filled: 1   The following medications were delivered to the patient:  Oxycodone/apap 5-325 mg    Additional Documentation:
Discharge instructions given with wife at the bedside. Pt and wife verbalized understanding of discharge instructions.  Wife signed patients discharge instructions
Left arm fistula positive for bruit and thrill
surgery. Stop NSAIDS 7 days before surgery. [x] DO NOT take any diabetic medicine the morning of surgery. Follow instructions for insulin the day before surgery. [x] If you are diabetic and your blood sugar is low or you feel symptomatic, you may drink 1-2 ounces of apple juice or take a glucose tablet.            -The morning of your procedure, you may call the pre-op area if you have concerns about your blood sugar [phone]. [x] Use your inhalers the morning of surgery. Bring your emergency inhaler with you day of surgery. [x] Follow physician instructions regarding any blood thinners you may be taking. WHAT TO EXPECT:    [x] The day of surgery you will be greeted and checked in by the [employer].  In addition, you will be registered in the [facility] by a Patient Access Representative. Please bring your photo ID and insurance card. A nurse will greet you in accordance to the time you are needed in the pre-op area to prepare you for surgery. Please do not be discouraged if you are not greeted in the order you arrive as there are many variables that are involved in patient preparation. Your patience is greatly appreciated as you wait for your nurse. Please bring in items such as: books, magazines, newspapers, electronics, or any other items  to occupy your time in the waiting area. [x]  Delays may occur with surgery and staff will make a sincere effort to keep you informed of delays. If any delays occur with your procedure, we apologize ahead of time for your inconvenience as we recognize the value of your time.

## 2023-08-18 ENCOUNTER — OFFICE VISIT (OUTPATIENT)
Dept: CARDIOLOGY CLINIC | Age: 78
End: 2023-08-18
Payer: MEDICARE

## 2023-08-18 VITALS
BODY MASS INDEX: 21.18 KG/M2 | SYSTOLIC BLOOD PRESSURE: 94 MMHG | RESPIRATION RATE: 16 BRPM | WEIGHT: 143 LBS | HEIGHT: 69 IN | HEART RATE: 57 BPM | DIASTOLIC BLOOD PRESSURE: 50 MMHG

## 2023-08-18 DIAGNOSIS — R00.1 BRADYCARDIA: Primary | ICD-10-CM

## 2023-08-18 DIAGNOSIS — R09.89 LEFT CAROTID BRUIT: ICD-10-CM

## 2023-08-18 PROCEDURE — 99214 OFFICE O/P EST MOD 30 MIN: CPT | Performed by: INTERNAL MEDICINE

## 2023-08-18 PROCEDURE — 93000 ELECTROCARDIOGRAM COMPLETE: CPT | Performed by: INTERNAL MEDICINE

## 2023-08-18 PROCEDURE — 1124F ACP DISCUSS-NO DSCNMKR DOCD: CPT | Performed by: INTERNAL MEDICINE

## 2023-08-18 RX ORDER — APIXABAN 5 MG/1
5 TABLET, FILM COATED ORAL 2 TIMES DAILY
COMMUNITY
Start: 2023-07-25

## 2023-08-18 RX ORDER — MIDODRINE HYDROCHLORIDE 2.5 MG/1
2.5 TABLET ORAL 3 TIMES DAILY
Qty: 90 TABLET | Refills: 5 | Status: SHIPPED | OUTPATIENT
Start: 2023-08-18

## 2023-08-18 ASSESSMENT — ENCOUNTER SYMPTOMS
BACK PAIN: 0
WHEEZING: 0
DIARRHEA: 0
VOMITING: 0
SHORTNESS OF BREATH: 0
CONSTIPATION: 0
ABDOMINAL PAIN: 0
BLOOD IN STOOL: 0
COUGH: 0
NAUSEA: 0

## 2023-08-18 NOTE — PROGRESS NOTES
OUTPATIENT CARDIOLOGY FOLLOW-UP    HPI:    Name: Acosta Alonso    Age: 78 y.o. Primary Care Physician: Winston Flores MD    Date of Service: 8/18/2023    Chief Complaint:   Chief Complaint   Patient presents with    Bradycardia    Loss of Consciousness        History of present illness :   69-year-old ex-smoker male who comes today for visit due to syncope and bradycardia. He is accompanied by his wife. He was seen in my office on 2/16/2022 for cardiac evaluation due to chronic bradycardia. He has longstanding history of bradycardia, hypertension, gout, peripheral arterial disease, paroxysmal atrial fibrillation during anesthesia prior to abdominal aortic aneurysm stenting at [facility] in 2017, status post bilateral bypass surgery to the lower extremity, syncopal event 3 years ago attributed to dehydration, chronic kidney disease and DVT. Patient has had 3 episodes of syncope March and May. 2 episodes occurred after hemodialysis and 1 occurred during hemodialysis. Patient has not been active. He has lost over 100 pounds over the past 2 years. He denies chest discomfort or dyspnea at his minimal level of activity. He feels dizzy and lightheaded at times. He denies lower extremity edema. He drinks 1 glass of bourbon every other day. EKG  done today revealed probable sinus bradycardia at 57 bpm, PVC and nonspecific T wave changes. Review of Systems:   Review of Systems   Constitutional:  Negative for chills, fatigue and fever. HENT:  Negative for nosebleeds. Respiratory:  Negative for cough, shortness of breath and wheezing. Gastrointestinal:  Negative for abdominal pain, blood in stool, constipation, diarrhea, nausea and vomiting. Genitourinary:  Negative for dysuria and hematuria. Musculoskeletal:  Negative for back pain, joint swelling and myalgias. Neurological:  Positive for dizziness and light-headedness. Negative for syncope.    Psychiatric/Behavioral:  The patient is not

## 2023-08-21 ENCOUNTER — OFFICE VISIT (OUTPATIENT)
Dept: VASCULAR SURGERY | Age: 78
End: 2023-08-21

## 2023-08-21 ENCOUNTER — TELEPHONE (OUTPATIENT)
Dept: VASCULAR SURGERY | Age: 78
End: 2023-08-21

## 2023-08-21 VITALS — DIASTOLIC BLOOD PRESSURE: 62 MMHG | SYSTOLIC BLOOD PRESSURE: 102 MMHG

## 2023-08-21 DIAGNOSIS — Z99.2 ENCOUNTER REGARDING VASCULAR ACCESS FOR DIALYSIS FOR ESRD (HCC): Primary | ICD-10-CM

## 2023-08-21 DIAGNOSIS — N18.6 ENCOUNTER REGARDING VASCULAR ACCESS FOR DIALYSIS FOR ESRD (HCC): Primary | ICD-10-CM

## 2023-08-21 PROCEDURE — 99024 POSTOP FOLLOW-UP VISIT: CPT

## 2023-08-21 NOTE — PROGRESS NOTES
8/21/2023    Larry Andrade  1945    PCP :Dr. Radha Cardoza  Nephrologist : Dr. Gina Estevez : [facility]    Previous Vascular Procedures   3/2023 R IJ tunn hd cath   7/14/23 L RC AVF      Patient returns for post operative evaluation. The patient denies any unexpected problems since the procedure. The wound is healing well. Denies left hand pain. He is dialyzing through a R IJ tunneled catheter. PE  Gen NAD Awake and Alert  Left Upper Extremity  Fistula has palpable thrill, and a bruit is ausculatated  Brachial 2+ Radial 2+ Ulnar 1+ Palmar biphasic  Wound is clean, dry and intact with no evidence of cellulitis or drainage  No deficits in sensation or motor     A/P Dialysis Access  left RC AV fistula  wound is healing well  no evidence of steal syndrome  av access is ready for use - we will contact his dialysis center to make them aware to start accessing his fistula. They should call us with any issues with the fistula and when the tunneled catheter is ready to be removed. I reviewed with the patient that normal activities can be resumed as tolerated  Return in 6 months for follow-up office visit.     Pt seen and plan reviewed with Dr. Cesar Meyers, ABRIL - CNP

## 2023-09-01 ENCOUNTER — TELEPHONE (OUTPATIENT)
Dept: VASCULAR SURGERY | Age: 78
End: 2023-09-01

## 2023-09-01 NOTE — TELEPHONE ENCOUNTER
Patient's wife Kathleen called and stated that the patient had a \"reaction\" of bruising and discoloration (L) arm after dialysis on 8-26-23. She is worried about it and would like a call. I spoke with Henrry Henderson at [facility] to find out what occurred. She stated that there was no problem with cannulation and the patient just had some mild infiltration when removing the needle. She explained to Mrs Avinash Riley that it isn't unusual to have this after treatment and that the next few sessions would be done with the catheter to rest the arm.       Agreed  Thanks pk

## 2023-09-25 ENCOUNTER — TELEPHONE (OUTPATIENT)
Dept: VASCULAR SURGERY | Age: 78
End: 2023-09-25

## 2023-09-25 ENCOUNTER — CLINICAL DOCUMENTATION (OUTPATIENT)
Dept: VASCULAR SURGERY | Age: 78
End: 2023-09-25

## 2023-09-25 NOTE — TELEPHONE ENCOUNTER
Spoke with patient's wife. Scheduled left arm fistulogram on Friday, 10-27-23. Report to [facility] admitting office at 8:30 am.  NPO after midnight except for heart and BP meds with sips of water. Hold Eliquis 2 days prior.

## 2023-09-25 NOTE — PROGRESS NOTES
Discussed with Eryn from [employer]    Issues with use of fistula  Infiltrated previously and now having a lot of problems with use  Some issues with patient movement also    Will have pt scheduled for Inga Donald MD

## 2023-10-18 ENCOUNTER — HOSPITAL ENCOUNTER (OUTPATIENT)
Dept: CARDIOLOGY | Age: 78
Discharge: HOME | End: 2023-10-20
Payer: MEDICARE

## 2023-10-18 DIAGNOSIS — R00.1 BRADYCARDIA: ICD-10-CM

## 2023-10-18 PROCEDURE — 93306 TTE W/DOPPLER COMPLETE: CPT

## 2023-10-24 ENCOUNTER — TELEPHONE (OUTPATIENT)
Dept: CARDIOLOGY CLINIC | Age: 78
End: 2023-10-24

## 2023-10-25 NOTE — TELEPHONE ENCOUNTER
Please inform patient that his echocardiogram revealed no significant valvular heart disease. Ejection fraction was normal at 55 to 60%.

## 2023-10-26 ENCOUNTER — TELEPHONE (OUTPATIENT)
Dept: CARDIAC CATH/INVASIVE PROCEDURES | Age: 78
End: 2023-10-26

## 2023-10-27 ENCOUNTER — TELEPHONE (OUTPATIENT)
Dept: VASCULAR SURGERY | Age: 78
End: 2023-10-27

## 2023-10-27 ENCOUNTER — HOSPITAL ENCOUNTER (OUTPATIENT)
Dept: CARDIAC CATH/INVASIVE PROCEDURES | Age: 78
Discharge: HOME | End: 2023-10-27

## 2023-10-27 ENCOUNTER — PREP FOR PROCEDURE (OUTPATIENT)
Dept: VASCULAR SURGERY | Age: 78
End: 2023-10-27

## 2023-10-27 DIAGNOSIS — N18.6 END STAGE RENAL DISEASE (HCC): ICD-10-CM

## 2023-10-27 RX ORDER — SODIUM CHLORIDE 0.9 % (FLUSH) 0.9 %
5-40 SYRINGE (ML) INJECTION EVERY 12 HOURS SCHEDULED
Status: DISCONTINUED | OUTPATIENT
Start: 2023-10-27 | End: 2023-10-28 | Stop reason: HOSPADM

## 2023-10-27 RX ORDER — SODIUM CHLORIDE 0.9 % (FLUSH) 0.9 %
5-40 SYRINGE (ML) INJECTION PRN
Status: DISCONTINUED | OUTPATIENT
Start: 2023-10-27 | End: 2023-10-28 | Stop reason: HOSPADM

## 2023-10-27 RX ORDER — SODIUM CHLORIDE 9 MG/ML
INJECTION, SOLUTION INTRAVENOUS PRN
Status: DISCONTINUED | OUTPATIENT
Start: 2023-10-27 | End: 2023-10-28 | Stop reason: HOSPADM

## 2023-10-27 NOTE — PROGRESS NOTES
Pt seen in holding area    L RC AVF is clotted.   Confirmed with US    [facility] plan on L UE AV access creation - will have office schedule    Sami Blanchard MD

## 2023-10-27 NOTE — TELEPHONE ENCOUNTER
Spoke with patient's wife. Scheduled L avf on Friday 11-17-23. Report to [facility]'s at 10:30 am.  NPO after midnight except for heart and BP meds with sips of water. Hold Eliquis x 2 days.

## 2023-11-13 NOTE — PROGRESS NOTES
[address]   PRE-ADMISSION TESTING GENERAL INSTRUCTIONS  [facility] Phone Number: [phone]      GENERAL INSTRUCTIONS:    [x] Antibacterial Soap Shower Night before and/or AM of surgery. [] CHG Wipes instruction sheet and wipes given. [] Hibiclens shower the night before AND the morning of surgery.   -Do not use Hibiclens on your face or head. [x] Do not wear makeup, lotions, powders, deodorant. [x] Nothing by mouth after midnight; including gum, candy, mints, or water. [x] You may brush your teeth, gargle, but do NOT swallow water. [x] No tobacco products, illegal drugs, or alcohol within 24 hours of your surgery. [x] Jewelry or valuables should not be brought to the hospital. All body and/or tongue piercing's must be removed prior to arriving to hospital. No contact lens or hair pins. [x] Arrange transportation with a responsible adult  to and from the hospital. Arrange for someone to be with you for the remainder of the day and for 24 hours after your procedure due to having had anesthesia. -Who will be your  for transportation?_______june___________         -Who will be staying with you for 24 hrs after your procedure?______june____________  [] Bring insurance card and photo ID.  [] Bring copy of living will or healthcare power of  papers to be placed in your electronic record. [] Urine Pregnancy test will be preformed the day of surgery. A specimen sample may be brought from home. [] Transfusion Vonna Found) Bracelet: Please bring with you to hospital, day of surgery. PARKING INSTRUCTIONS:     [x] ARRIVAL DATE & TIME: 11/17/23  [] Times are subject to change. We will contact you the business day before surgery if that were to occur. [x] Enter into the [email or web address]. Two people may accompany you. Masks are not required. [x] Parking Lot \"I\" is where you will park. It is located on the corner of [address] [place] [address] and [address] [facility].  The greet you in accordance to the time you are needed in the pre-op area to prepare you for surgery. Please do not be discouraged if you are not greeted in the order you arrive as there are many variables that are involved in patient preparation. Your patience is greatly appreciated as you wait for your nurse.    []  Delays may occur with surgery and staff will make a sincere effort to keep you informed of delays. If any delays occur with your procedure, we apologize ahead of time for your inconvenience as we recognize the value of your time.

## 2023-11-14 NOTE — PROGRESS NOTES
3/1/22: I have independently reviewed all of the ECGs and rhythm strips per above     Assessment/Plan: This is a 78 y.o. male with a history of   Patient Active Problem List   Diagnosis    Aneurysm of abdominal aorta (HCC)    History of DVT (deep vein thrombosis)    Iliac artery aneurysm, bilateral (HCC)    History of tobacco use    Open leg wound, right, subsequent encounter    Anemia of chronic renal failure, stage 5 ([address])    Encounter regarding vascular access for dialysis for ESRD ([address])    End stage renal disease ([address])    who presents with syncope and sinus bradycardia. 1. Syncope  - Unclear etiology however based on history and physical this is most consistent with hypotension but can not fully rule out SND with no prodromal symptoms.  - EKG has not shown any other clear arrhythmogenic cause (WPW, HOCM, LQT, Brugada, ARVC)  - Other cardiac workup thus far has shown normal echocardiogram and stress test  - 14 day cardiac monitor showed asymptomatic SVTs. - Advised life style modifications as below     - Make all postural changes from lying to sitting or sitting to standing slowly. - Drink to 2.0 -2.5 L of fluids per day. - Increase sodium in the diet to 3 - 5 g per day. - Avoid large meals which can cause low blood pressure during digestion.     - Avoid alcohol and excessive caffeine intake. - Perform lower extremity exercises to improve strength of the leg muscles. - Use physical counter maneuvers such as leg crossing, or leg raising and resting the leg on a chair.      - Raise the head of the bed by 6 to 10 inches. - Use custom fitted elastic support stockings. - On Midodrine    2. Sinus bradycardia  - Asymptomatic.  - No significant pause or AV block noted on 14 day cardiac monitor. 3. Paroxysmal atrial fibrillation   - During anesthesia prior to abdominal aortic aneurysm stenting at [facility] in 2018. - Previously on Eliquis and currently is not taking.     4. Paroxysmal

## 2023-11-15 ENCOUNTER — OFFICE VISIT (OUTPATIENT)
Dept: NON INVASIVE DIAGNOSTICS | Age: 78
End: 2023-11-15
Payer: MEDICARE

## 2023-11-15 VITALS
HEART RATE: 51 BPM | HEIGHT: 69 IN | WEIGHT: 140 LBS | SYSTOLIC BLOOD PRESSURE: 110 MMHG | RESPIRATION RATE: 16 BRPM | DIASTOLIC BLOOD PRESSURE: 70 MMHG | BODY MASS INDEX: 20.73 KG/M2

## 2023-11-15 DIAGNOSIS — R00.1 BRADYCARDIA: Primary | ICD-10-CM

## 2023-11-15 PROCEDURE — 99205 OFFICE O/P NEW HI 60 MIN: CPT | Performed by: INTERNAL MEDICINE

## 2023-11-15 PROCEDURE — 1124F ACP DISCUSS-NO DSCNMKR DOCD: CPT | Performed by: INTERNAL MEDICINE

## 2023-11-15 PROCEDURE — 93000 ELECTROCARDIOGRAM COMPLETE: CPT | Performed by: INTERNAL MEDICINE

## 2023-11-15 NOTE — PATIENT INSTRUCTIONS
Advised life style modifications as below:     - Make all postural changes from lying to sitting or sitting to standing slowly. - Increase sodium in the diet to 3 - 5 g per day. If not helpful and BP is stable, may try 5-7 g per day. - Avoid large meals which can cause low blood pressure during digestion. It is better to eat smaller meals more often than three large meals. - Avoid alcohol. Alcohol and cause blood to pool in the legs which may worsen low blood pressure reactions when standing. Avoid excessive caffeine intake as it may increase urine production and reduce blood volume. - Perform lower extremity exercises to improve strength of the leg muscles. This will help prevent blood from a pooling in the legs when standing and walking. Preferred exercises are walking, squatting or stationery bicycling.      -Use physical counter maneuvers such as leg crossing, or leg raising and resting the leg on a chair. These maneuvers increase blood pressure and can improve orthostatic intolerance quickly and transiently. - Raise the head of the bed by 6 to 10 inches. The entire bed must be at an angle. Raising only the head portion of the bed at waist level or using pillows will not be effective. Raising the head of the bed will reduce urine formation overnight and there will be more volume in the circulation in the morning.      - Use custom fitted elastic support stockings.  These will reduce a tendency for blood to pool in the legs when standing and may improve orthostatic intolerance

## 2023-11-17 ENCOUNTER — ANESTHESIA EVENT (OUTPATIENT)
Dept: OPERATING ROOM | Age: 78
End: 2023-11-17
Payer: MEDICARE

## 2023-11-17 ENCOUNTER — ANESTHESIA (OUTPATIENT)
Dept: OPERATING ROOM | Age: 78
End: 2023-11-17
Payer: MEDICARE

## 2023-11-17 ENCOUNTER — HOSPITAL ENCOUNTER (OUTPATIENT)
Age: 78
Setting detail: OUTPATIENT SURGERY
Discharge: HOME | End: 2023-11-17
Attending: SURGERY | Admitting: SURGERY
Payer: MEDICARE

## 2023-11-17 VITALS
SYSTOLIC BLOOD PRESSURE: 113 MMHG | WEIGHT: 145 LBS | HEART RATE: 63 BPM | BODY MASS INDEX: 21.48 KG/M2 | OXYGEN SATURATION: 96 % | TEMPERATURE: 97.5 F | DIASTOLIC BLOOD PRESSURE: 56 MMHG | HEIGHT: 69 IN | RESPIRATION RATE: 15 BRPM

## 2023-11-17 DIAGNOSIS — Z99.2 ENCOUNTER REGARDING VASCULAR ACCESS FOR DIALYSIS FOR ESRD (HCC): Primary | ICD-10-CM

## 2023-11-17 DIAGNOSIS — N18.6 ENCOUNTER REGARDING VASCULAR ACCESS FOR DIALYSIS FOR ESRD (HCC): Primary | ICD-10-CM

## 2023-11-17 LAB
ABO + RH BLD: NORMAL
ANION GAP SERPL CALCULATED.3IONS-SCNC: 17 MMOL/L (ref 7–16)
ARM BAND NUMBER: NORMAL
BLOOD BANK SAMPLE EXPIRATION: NORMAL
BLOOD GROUP ANTIBODIES SERPL: NEGATIVE
BUN SERPL-MCNC: 23 MG/DL (ref 6–23)
CALCIUM SERPL-MCNC: 9.8 MG/DL (ref 8.6–10.2)
CHLORIDE SERPL-SCNC: 92 MMOL/L (ref 98–107)
CO2 SERPL-SCNC: 26 MMOL/L (ref 22–29)
CREAT SERPL-MCNC: 4.9 MG/DL (ref 0.7–1.2)
ERYTHROCYTE [DISTWIDTH] IN BLOOD BY AUTOMATED COUNT: 14 % (ref 11.5–15)
GFR SERPL CREATININE-BSD FRML MDRD: 11 ML/MIN/1.73M2
GLUCOSE SERPL-MCNC: 93 MG/DL (ref 74–99)
HCT VFR BLD AUTO: 42.9 % (ref 37–54)
HGB BLD-MCNC: 13.7 G/DL (ref 12.5–16.5)
INR PPP: 1
MCH RBC QN AUTO: 31.6 PG (ref 26–35)
MCHC RBC AUTO-ENTMCNC: 31.9 G/DL (ref 32–34.5)
MCV RBC AUTO: 99.1 FL (ref 80–99.9)
PLATELET # BLD AUTO: 169 K/UL (ref 130–450)
PMV BLD AUTO: 11 FL (ref 7–12)
POTASSIUM SERPL-SCNC: 5 MMOL/L (ref 3.5–5)
PROTHROMBIN TIME: 11.1 SEC (ref 9.3–12.4)
RBC # BLD AUTO: 4.33 M/UL (ref 3.8–5.8)
SODIUM SERPL-SCNC: 135 MMOL/L (ref 132–146)
WBC OTHER # BLD: 10.3 K/UL (ref 4.5–11.5)

## 2023-11-17 PROCEDURE — 86900 BLOOD TYPING SEROLOGIC ABO: CPT

## 2023-11-17 PROCEDURE — 2580000003 HC RX 258: Performed by: SURGERY

## 2023-11-17 PROCEDURE — 3700000000 HC ANESTHESIA ATTENDED CARE: Performed by: SURGERY

## 2023-11-17 PROCEDURE — 3600000012 HC SURGERY LEVEL 2 ADDTL 15MIN: Performed by: SURGERY

## 2023-11-17 PROCEDURE — 7100000011 HC PHASE II RECOVERY - ADDTL 15 MIN: Performed by: SURGERY

## 2023-11-17 PROCEDURE — 64415 NJX AA&/STRD BRCH PLXS IMG: CPT | Performed by: ANESTHESIOLOGY

## 2023-11-17 PROCEDURE — 3700000001 HC ADD 15 MINUTES (ANESTHESIA): Performed by: SURGERY

## 2023-11-17 PROCEDURE — 2580000003 HC RX 258: Performed by: INTERNAL MEDICINE

## 2023-11-17 PROCEDURE — 6360000002 HC RX W HCPCS: Performed by: INTERNAL MEDICINE

## 2023-11-17 PROCEDURE — 85027 COMPLETE CBC AUTOMATED: CPT

## 2023-11-17 PROCEDURE — 6360000002 HC RX W HCPCS: Performed by: PHYSICIAN ASSISTANT

## 2023-11-17 PROCEDURE — 6360000002 HC RX W HCPCS: Performed by: ANESTHESIOLOGY

## 2023-11-17 PROCEDURE — 2709999900 HC NON-CHARGEABLE SUPPLY: Performed by: SURGERY

## 2023-11-17 PROCEDURE — 85610 PROTHROMBIN TIME: CPT

## 2023-11-17 PROCEDURE — 6360000002 HC RX W HCPCS: Performed by: SURGERY

## 2023-11-17 PROCEDURE — 7100000010 HC PHASE II RECOVERY - FIRST 15 MIN: Performed by: SURGERY

## 2023-11-17 PROCEDURE — 86850 RBC ANTIBODY SCREEN: CPT

## 2023-11-17 PROCEDURE — 2580000003 HC RX 258: Performed by: PHYSICIAN ASSISTANT

## 2023-11-17 PROCEDURE — A4217 STERILE WATER/SALINE, 500 ML: HCPCS | Performed by: SURGERY

## 2023-11-17 PROCEDURE — 36821 AV FUSION DIRECT ANY SITE: CPT | Performed by: SURGERY

## 2023-11-17 PROCEDURE — 80048 BASIC METABOLIC PNL TOTAL CA: CPT

## 2023-11-17 PROCEDURE — 86901 BLOOD TYPING SEROLOGIC RH(D): CPT

## 2023-11-17 PROCEDURE — 3600000002 HC SURGERY LEVEL 2 BASE: Performed by: SURGERY

## 2023-11-17 RX ORDER — MIDAZOLAM HYDROCHLORIDE 2 MG/2ML
1 INJECTION, SOLUTION INTRAMUSCULAR; INTRAVENOUS EVERY 5 MIN PRN
Status: DISCONTINUED | OUTPATIENT
Start: 2023-11-17 | End: 2023-11-17 | Stop reason: HOSPADM

## 2023-11-17 RX ORDER — SODIUM CHLORIDE 9 MG/ML
INJECTION, SOLUTION INTRAVENOUS PRN
Status: DISCONTINUED | OUTPATIENT
Start: 2023-11-17 | End: 2023-11-17 | Stop reason: HOSPADM

## 2023-11-17 RX ORDER — SODIUM CHLORIDE 9 MG/ML
INJECTION, SOLUTION INTRAVENOUS CONTINUOUS
Status: DISCONTINUED | OUTPATIENT
Start: 2023-11-17 | End: 2023-11-17 | Stop reason: HOSPADM

## 2023-11-17 RX ORDER — SODIUM CHLORIDE 0.9 % (FLUSH) 0.9 %
5-40 SYRINGE (ML) INJECTION EVERY 12 HOURS SCHEDULED
Status: DISCONTINUED | OUTPATIENT
Start: 2023-11-17 | End: 2023-11-17 | Stop reason: HOSPADM

## 2023-11-17 RX ORDER — SODIUM CHLORIDE 0.9 % (FLUSH) 0.9 %
5-40 SYRINGE (ML) INJECTION PRN
Status: DISCONTINUED | OUTPATIENT
Start: 2023-11-17 | End: 2023-11-17 | Stop reason: HOSPADM

## 2023-11-17 RX ORDER — ROPIVACAINE HYDROCHLORIDE 5 MG/ML
20 INJECTION, SOLUTION EPIDURAL; INFILTRATION; PERINEURAL
Status: COMPLETED | OUTPATIENT
Start: 2023-11-17 | End: 2023-11-17

## 2023-11-17 RX ORDER — SODIUM CHLORIDE 9 MG/ML
INJECTION, SOLUTION INTRAVENOUS CONTINUOUS PRN
Status: DISCONTINUED | OUTPATIENT
Start: 2023-11-17 | End: 2023-11-17 | Stop reason: SDUPTHER

## 2023-11-17 RX ORDER — TRAMADOL HYDROCHLORIDE 50 MG/1
100 TABLET ORAL PRN
Status: DISCONTINUED | OUTPATIENT
Start: 2023-11-17 | End: 2023-11-17 | Stop reason: HOSPADM

## 2023-11-17 RX ORDER — HEPARIN SODIUM 1000 [USP'U]/ML
INJECTION, SOLUTION INTRAVENOUS; SUBCUTANEOUS PRN
Status: DISCONTINUED | OUTPATIENT
Start: 2023-11-17 | End: 2023-11-17 | Stop reason: SDUPTHER

## 2023-11-17 RX ORDER — PROPOFOL 10 MG/ML
INJECTION, EMULSION INTRAVENOUS CONTINUOUS PRN
Status: DISCONTINUED | OUTPATIENT
Start: 2023-11-17 | End: 2023-11-17 | Stop reason: SDUPTHER

## 2023-11-17 RX ORDER — OXYCODONE HYDROCHLORIDE AND ACETAMINOPHEN 5; 325 MG/1; MG/1
1 TABLET ORAL EVERY 6 HOURS PRN
Qty: 28 TABLET | Refills: 0 | Status: SHIPPED | OUTPATIENT
Start: 2023-11-17 | End: 2023-11-24

## 2023-11-17 RX ORDER — ROPIVACAINE HYDROCHLORIDE 5 MG/ML
INJECTION, SOLUTION EPIDURAL; INFILTRATION; PERINEURAL
Status: COMPLETED | OUTPATIENT
Start: 2023-11-17 | End: 2023-11-17

## 2023-11-17 RX ORDER — LIDOCAINE HYDROCHLORIDE 10 MG/ML
10 INJECTION, SOLUTION INFILTRATION; PERINEURAL
Status: DISCONTINUED | OUTPATIENT
Start: 2023-11-17 | End: 2023-11-17 | Stop reason: HOSPADM

## 2023-11-17 RX ORDER — TRAMADOL HYDROCHLORIDE 50 MG/1
50 TABLET ORAL PRN
Status: DISCONTINUED | OUTPATIENT
Start: 2023-11-17 | End: 2023-11-17 | Stop reason: HOSPADM

## 2023-11-17 RX ORDER — PHENYLEPHRINE HCL IN 0.9% NACL 1 MG/10 ML
SYRINGE (ML) INTRAVENOUS PRN
Status: DISCONTINUED | OUTPATIENT
Start: 2023-11-17 | End: 2023-11-17 | Stop reason: SDUPTHER

## 2023-11-17 RX ADMIN — MIDAZOLAM 2 MG: 1 INJECTION INTRAMUSCULAR; INTRAVENOUS at 12:16

## 2023-11-17 RX ADMIN — Medication 200 MCG: at 13:47

## 2023-11-17 RX ADMIN — CEFAZOLIN 2000 MG: 2 INJECTION, POWDER, FOR SOLUTION INTRAMUSCULAR; INTRAVENOUS at 13:23

## 2023-11-17 RX ADMIN — Medication 150 MCG: at 13:37

## 2023-11-17 RX ADMIN — HEPARIN SODIUM 6000 UNITS: 1000 INJECTION INTRAVENOUS; SUBCUTANEOUS at 13:31

## 2023-11-17 RX ADMIN — Medication 150 MCG: at 13:28

## 2023-11-17 RX ADMIN — PROPOFOL 100 MCG/KG/MIN: 10 INJECTION, EMULSION INTRAVENOUS at 13:21

## 2023-11-17 RX ADMIN — ROPIVACAINE HYDROCHLORIDE 25 ML: 5 INJECTION EPIDURAL; INFILTRATION; PERINEURAL at 12:15

## 2023-11-17 RX ADMIN — ROPIVACAINE HYDROCHLORIDE 20 ML: 5 INJECTION EPIDURAL; INFILTRATION; PERINEURAL at 12:18

## 2023-11-17 RX ADMIN — SODIUM CHLORIDE: 9 INJECTION, SOLUTION INTRAVENOUS at 13:14

## 2023-11-17 ASSESSMENT — PAIN - FUNCTIONAL ASSESSMENT: PAIN_FUNCTIONAL_ASSESSMENT: NONE - DENIES PAIN

## 2023-11-17 NOTE — PROGRESS NOTES
Per verbal reading by  in chemistry pt K+ is 5.0.  Dxr.  Andra notified and stated we can proceed with nerve block

## 2023-11-17 NOTE — H&P
Vascular Surgery History & Physical Exam      Chief Complaint:  ESRD    HISTORY OF PRESENT ILLNESS:                The patient is a 78 y.o. male who presents to the hospital for elective creation of a arteriovenous fistula, possible graft. The patient denies any problems since the last office visit. IMPRESSION:   ESRD    PLAN:  Creation of a left upper extremity arteriovenous fistula, possible graft    I reviewed the procedure with the patient and family as available. I discussed the procedure, risks, benefits, complications, and alternatives of the procedure. They understand and consent.   All questions were answered    ROS : All others Negative if blank [], Positive if [x]  General   [] Fevers   [] Chills   [] Weight Loss   Skin   [] Tissue Loss   Eyes   [] Wears Glasses/Contacts   [] Vision Changes   Respiratory    [] Shortness of breath   Cardiovascular   [] Chest Pain   [] Shortness of breath with exertion   Gastrointestinal   [] Abdominal Pain     Past Medical History:   Diagnosis Date    Aneurysm of abdominal aorta ([address]) 9/27/2012    Deep vein thrombosis of calf (HCC)     R calf vein thrombosis involving post tibial vein with extension in close proximity  to popliteal vein    Gout     History of DVT (deep vein thrombosis) 3/12/2015    History of tobacco use 7/27/2017    Hypertension     Iliac artery aneurysm, bilateral ([address]) 6/29/2017    Loss of weight     diet and exercise    Right leg DVT ([address]) 3/12/2015     Past Surgical History:   Procedure Laterality Date    ABDOMINAL AORTIC ANEURYSM REPAIR      2018    HERNIA REPAIR      OPEN REPAIR PERIPHERAL ANEURYSM Bilateral 2018    TONSILLECTOMY      VASCULAR SURGERY Left 7/14/2023    insertion AVF left arm performed by Waldo Lennox, MD at [facility] OR     Current Medications:     Current Facility-Administered Medications:     0.9 % sodium chloride infusion, , IntraVENous, Continuous, Yolanda Rodriguez PA-C    sodium chloride flush 0.9 % injection 5-40 distress, and appears stated age  NECK:  supple, symmetrical, trachea midline  LUNGS:  no increased work of breathing, good resp excursion  CARDIOVASCULAR:  S`1S2   ABDOMEN:  soft, non-distended and non-tender  left upper extremity   Brachial 2+ Radial 1+    LABS:    Lab Results   Component Value Date    WBC 10.3 11/17/2023    HGB 13.7 11/17/2023    HCT 42.9 11/17/2023     11/17/2023    PROTIME 11.1 11/17/2023    INR 1.0 11/17/2023    APTT 27.9 10/14/2017    K 5.0 11/17/2023    BUN 23 11/17/2023    CREATININE 4.9 (H) 11/17/2023       Nelly Castaneda MD

## 2023-11-17 NOTE — ANESTHESIA PROCEDURE NOTES
Peripheral Block    Patient location during procedure: procedure area  Reason for block: post-op pain management and at surgeon's request  Start time: 11/17/2023 12:15 PM  End time: 11/17/2023 12:20 PM  Staffing  Performed: anesthesiologist   Anesthesiologist: Lara Virk MD  Performed by: Lara Virk MD  Authorized by: Lara Virk MD    Preanesthetic Checklist  Completed: patient identified, IV checked, site marked, risks and benefits discussed, surgical/procedural consents, equipment checked, pre-op evaluation, timeout performed, anesthesia consent given, oxygen available and monitors applied/VS acknowledged  Peripheral Block   Patient position: supine  Prep: alcohol swabs  Provider prep: mask and sterile gloves  Patient monitoring: cardiac monitor, continuous pulse ox, frequent blood pressure checks, IV access, oxygen and responsive to questions  Block type: Brachial plexus  Supraclavicular  Laterality: left  Injection technique: single-shot  Guidance: ultrasound guided  Infiltration strength: 0 %  Dose: 0 mL    Needle   Needle type: short-bevel   Needle gauge: 22 G  Needle localization: ultrasound guidance  Needle length: 8 cm  Assessment   Injection assessment: negative aspiration for heme, no paresthesia on injection and local visualized surrounding nerve on ultrasound  Paresthesia pain: none  Slow fractionated injection: yes  Hemodynamics: stable  Real-time US image taken/store: yes  Outcomes: uncomplicated    Additional Notes  Mixture of 0.5% Ropivacaine 25 mL  injected in incremental doses of 5 mL after negative blood aspiration.   Medications Administered  ropivacaine (NAROPIN) injection 0.5% - Perineural   25 mL - 11/17/2023 12:15:00 PM

## 2023-11-18 NOTE — OP NOTE
Operative Note      Patient: Elsa Coronel  YOB: 1945  MRN: [id number]    Date of Procedure: 11/17/2023    Pre-Op Diagnosis Codes:     * End stage renal disease ([address]) [N18.6]    Post-Op Diagnosis: Same       Procedure(s):  creation AVF left arm    Surgeon(s):  Cortez Contreras MD    Assistant:   First Assistant: ABRIL Tripathi CNP    Anesthesia: Regional    Estimated Blood Loss (mL): Minimal    Complications: None    DESCRIPTION OF PROCEDURE: The patient was identified and the procedure was confirmed. The left arm was prepped and draped in the usual sterile fashion. Lidocaine 1% mixed with 0.25% Marcaine was used for local anesthesia. A transverse skin incision was made in the antecubital fossa and carried down through the subcutaneous tissue. The cephalic vein was identified and dissected free from the surrounding tissues. The vein appeared to be good quality for the fistula. It was marked for orientation and branches were divided between silk ties. It was ligated distally and divided. Medially, the brachial artery was identified and freed from the surrounding tissues. It was surrounded proximally and distally with vessel loops for control. The patient was heparinized and the artery was clamped proximally and distally. A longitudinal arteriotomy measuring 5 mm was made. The vein was cut to the appropriate length and spatulated and anastomosed to the side of the artery using a running 6-0 Prolene suture. After completing the anastomosis, the clamps were released and a thrill was appreciated through the fistula. A radial weakly biphasic, ulnar biphasic, palmar biphasic was appreciated in the wrist.     A large upper arm branch was noted and incision was made and branch was ligated with 2.0 silk ties. Hemostasis was obtained and the incisions were irrigated with saline solution.  The incision was approximated with Vicryl sutures and Dermabond was applied to the incisions in the

## 2023-11-20 NOTE — ANESTHESIA POSTPROCEDURE EVALUATION
Department of Anesthesiology  Postprocedure Note    Patient: Julia Sabillon  MRN: [id number]  [address] [facility] 1945  Date of evaluation: 11/20/2023      Procedure Summary     Date: 11/17/23 Room / Location: [facility] 04 / [facility]    Anesthesia Start: 2574 Anesthesia Stop: 9093    Procedure: creation AVF left arm (Left: Arm Lower) Diagnosis:       End stage renal disease ([address])      (End stage renal disease ([address]) [N18.6])    Surgeons: Tessie Bob MD Responsible Provider: Jason Rivera MD    Anesthesia Type: MAC ASA Status: 4          Anesthesia Type: MAC    Becky Phase I: Becky Score: 10    Becky Phase II: Becky Score: 9      Anesthesia Post Evaluation    Patient location during evaluation: PACU  Patient participation: complete - patient participated  Level of consciousness: awake and alert  Airway patency: patent  Nausea & Vomiting: no nausea and no vomiting  Complications: no  Cardiovascular status: hemodynamically stable  Respiratory status: acceptable  Hydration status: euvolemic  Multimodal analgesia pain management approach  Pain management: adequate

## 2023-12-11 ENCOUNTER — OFFICE VISIT (OUTPATIENT)
Dept: VASCULAR SURGERY | Age: 78
End: 2023-12-11

## 2023-12-11 DIAGNOSIS — N18.6 ENCOUNTER REGARDING VASCULAR ACCESS FOR DIALYSIS FOR ESRD (HCC): Primary | ICD-10-CM

## 2023-12-11 DIAGNOSIS — Z99.2 ENCOUNTER REGARDING VASCULAR ACCESS FOR DIALYSIS FOR ESRD (HCC): Primary | ICD-10-CM

## 2023-12-11 PROCEDURE — 99024 POSTOP FOLLOW-UP VISIT: CPT | Performed by: PHYSICIAN ASSISTANT

## 2023-12-11 NOTE — PROGRESS NOTES
12/11/2023    Margaret Wyman  1945    PCP : Erasto Silva MD  Nephrologist : Dr. Radha Roque : [facility] [address] [facility]     Previous Vascular Procedures   3/2023 R IJ tunn hd cath   7/14/23 L RC AVF   11/18/23 L BC AVF      Patient returns for post operative evaluation. The patient denies any unexpected problems since the procedure. The wound is healing well. Denies left hand pain. PE  Gen NAD Awake and Alert  left Upper Extremity  Fistula has palpable  thrill, and a bruit is auscultated  Vein is well dilated to >7 mm  Brachial 3 Radial biphasic   Ulnar biphasic Palmar biphasic  Wound is clean, dry and intact  with no evidence of cellulitis or drainage  No deficits in sensation or motor    A/P Dialysis Access  left BC AV fistula  wound is healing well  no evidence of steal syndrome  av access is patent  He is still having issues with hypotension - his pcp just made adjustments to his midodrine  I asked them to monitor his BP and we will also call the facility to see how it has been running  Do not want to use the access until his BP is better controlled due to concern for thrombosis  Once BP is under control, can begin using the AVF  I reviewed with the patient that normal activities can be resumed as tolerated  Return in 6 months for follow-up office visit. Pt seen and plan reviewed with Dr. Keeley Lucero.      Brigido Mckeon PA-C

## 2024-01-03 ENCOUNTER — TELEPHONE (OUTPATIENT)
Dept: NON INVASIVE DIAGNOSTICS | Age: 79
End: 2024-01-03

## 2024-01-03 NOTE — TELEPHONE ENCOUNTER
Please check prior auth.    Date:01/03/2024    Doc:[email or web address]    Procedure:  ILR implant - MDT    CPT: 90985    ICD: R55    Electronically signed by Keiko Graham MA on 1/3/2024 at 2:56 PM

## 2024-01-04 NOTE — TELEPHONE ENCOUNTER
Per Availity, no precert required.  Also spoke with Suly at [facility] who confirmed that no precert is required for this procedure.  Call reference #[id number]

## 2024-01-26 ENCOUNTER — HOSPITAL ENCOUNTER (OUTPATIENT)
Age: 79
Setting detail: OUTPATIENT SURGERY
Discharge: HOME | End: 2024-01-26
Attending: INTERNAL MEDICINE | Admitting: INTERNAL MEDICINE
Payer: MEDICARE

## 2024-01-26 VITALS
DIASTOLIC BLOOD PRESSURE: 47 MMHG | TEMPERATURE: 97.3 F | OXYGEN SATURATION: 100 % | BODY MASS INDEX: 20.97 KG/M2 | HEART RATE: 39 BPM | WEIGHT: 142 LBS | RESPIRATION RATE: 18 BRPM | SYSTOLIC BLOOD PRESSURE: 102 MMHG

## 2024-01-26 DIAGNOSIS — R55 SYNCOPE AND COLLAPSE: ICD-10-CM

## 2024-01-26 PROBLEM — R00.1 SINUS BRADYCARDIA: Status: ACTIVE | Noted: 2024-01-26

## 2024-01-26 PROBLEM — R42 DIZZINESS: Status: ACTIVE | Noted: 2024-01-26

## 2024-01-26 PROCEDURE — 2500000003 HC RX 250 WO HCPCS: Performed by: INTERNAL MEDICINE

## 2024-01-26 PROCEDURE — 33285 INSJ SUBQ CAR RHYTHM MNTR: CPT | Performed by: INTERNAL MEDICINE

## 2024-01-26 PROCEDURE — C1764 EVENT RECORDER, CARDIAC: HCPCS | Performed by: INTERNAL MEDICINE

## 2024-01-26 PROCEDURE — 2709999900 HC NON-CHARGEABLE SUPPLY: Performed by: INTERNAL MEDICINE

## 2024-01-26 PROCEDURE — 99215 OFFICE O/P EST HI 40 MIN: CPT | Performed by: INTERNAL MEDICINE

## 2024-01-26 PROCEDURE — 7100000010 HC PHASE II RECOVERY - FIRST 15 MIN: Performed by: INTERNAL MEDICINE

## 2024-01-26 PROCEDURE — 7100000011 HC PHASE II RECOVERY - ADDTL 15 MIN: Performed by: INTERNAL MEDICINE

## 2024-01-26 DEVICE — ICM LNQ22 LINQ II USA
Type: IMPLANTABLE DEVICE | Status: FUNCTIONAL
Brand: LINQ II™

## 2024-01-26 NOTE — DISCHARGE INSTRUCTIONS
[facility]  Implantable Cardiac Monitor Discharge Instructions For Patients      Medications:  Resume all prescribed medications.    Follow-Up: You will be seen on Friday 2/9/24 at 10:30 am at the [facility] for an incision and device check.  Please call the office if you need to reschedule this appointment. The number is [phone]     Incision Care:  Leave the brown AquaCel dressing on for 7 days.  Remove AquaCel dressing on Friday 2/2/24, but do not remove the Steri-Strips from your incision. They will fall off on their own, or they will be removed at your follow-up appointment.   You may shower starting tomorrow, but do not let the water run directly on the incision  for 7 days.   Check the area daily. If you find any redness, swelling, drainage, warmth, or have a fever greater than 100 degrees, notify the office immediately at the number listed below.     Activity: You may continue regular daily activities, but try to prevent any hard blows to the incision site.    Driving: No driving until the day after your procedure.    Special Instructions: Let your dentist, doctor, or medical specialist know that you have an implantable cardiac monitor so precautions can be taken to protect the device. There is no need to take antibiotics prior to dental procedures. Your device is considered to be \"MRI conditional,\" meaning that under certain circumstances, MRI exams may be performed immediately post implantation. Your device may set off a metal detector, such as those used in airports and courtrooms. Let security know you have an implantable cardiac monitor and show them your ID card.    ID Card: You will have a temporary ID card until a permanent card is sent to you by the device company. The permanent card will look like a ’s license or credit card and should arrive within 8 weeks. Carry your ID card with you at all times.     [facility]:   [id number]

## 2024-01-26 NOTE — H&P
[facility] PHYSICIANS- [facility]  History and Physical Examination  PATIENT: Cleveland Thompson  MEDICAL RECORD NUMBER: [id number]  DATE OF SERVICE:  1/26/2024  ATTENDING ELECTROPHYSIOLOGIST: Johnson Gonsalves MD  REFERRING PHYSICIAN: Rafal Weber MD  CHIEF COMPLAINT: Syncope and bradycardia    HPI: This is a 78 y.o. male with a history of   Patient Active Problem List   Diagnosis    Aneurysm of abdominal aorta ([facility])    History of DVT (deep vein thrombosis)    Iliac artery aneurysm, bilateral ([facility])    History of tobacco use    Open leg wound, right, subsequent encounter    Anemia of chronic renal failure, stage 5 ([facility])    Encounter regarding vascular access for dialysis for ESRD ([facility])    End stage renal disease ([facility])   who presents to cardiac electrophysiology clinic for consultation of syncope and sinus bradycardia. The patient has history of recurrent syncope, sinus bradycardia, paroxysmal atrial fibrillation during anesthesia prior to abdominal aortic aneurysm stenting at [facility] in 2018, peripheral vascular disease status post Bilateral lower extremity femoral to below-knee popliteal bypasses for popliteal aneurysms, hypertension, ESRD on HD and DVT. The patient reports syncopal episode 3 years ago in Summer of 2020 attributed to dehydration at a [facility] tailgate. He reports after using the bathroom and walking out about 100 yards he passed out without warning signs and was down for a few seconds. He goes on to report he had a \"few beers\" and it was very hot. He was evaluated at time at the ER and was worked up for dehydration. He has had 3 episodes of syncope in March (at home when standing in front of the microwave after got back from hemodialysis) , April (during dialysis) and May 2023 (after dialysis while walking in the grocery store). Again he had no warning sign prior to these past 3 syncopal episodes. All episodes that were witnessed, lasted a few seconds and

## 2024-02-01 ENCOUNTER — TELEPHONE (OUTPATIENT)
Dept: NON INVASIVE DIAGNOSTICS | Age: 79
End: 2024-02-01

## 2024-02-01 NOTE — TELEPHONE ENCOUNTER
Called and spoke with patient's wife June. Patient is unavailable to talk on the phone.   Reviewed the most recent transmission with June. 2 daniel episodes on 1/30/2024 @ 0633 and 0715. She confirms that the patient was asleep during that timeframe. Patient usually sleeps in.       Plan:   Will continue to monitor.   Forwarding to Dr. Gonsalves.   Keep wound check on 2/9/2024.    Eryn BRICENO,RN   [facility] Heart and Vascular [place]   Device Clinic

## 2024-02-01 NOTE — TELEPHONE ENCOUNTER
----- Message from Johnson Gonsalves MD sent at 1/31/2024 10:28 PM EST -----  Bradycardia  1  · Stored EGMs are consistent with or suggestive of bradycardia  · Total episodes: 2  · Episode ID 6 30-Jan-2024 duration 8 sec, rate 32 bpm  · Episode ID 5 also from 30-Jan-2024 duration 9 seconds, rate 30 bpm  · times are consistent with AM hours  Created By: Diego Crandall 01/31/2024 10:55 AM    Was he symptomatic? Was he sleeping ? Thanks.

## 2024-02-09 ENCOUNTER — NURSE ONLY (OUTPATIENT)
Dept: NON INVASIVE DIAGNOSTICS | Age: 79
End: 2024-02-09

## 2024-02-09 DIAGNOSIS — Z95.818 IMPLANTABLE LOOP RECORDER PRESENT: ICD-10-CM

## 2024-02-09 DIAGNOSIS — R42 DIZZINESS: ICD-10-CM

## 2024-02-09 DIAGNOSIS — R00.1 SINUS BRADYCARDIA: Primary | ICD-10-CM

## 2024-02-09 NOTE — PROGRESS NOTES
See Murj report.    La Nena Vargas RN, BSN  [facility] Heart and Vascular [place]   Device Clinic

## 2024-02-09 NOTE — PATIENT INSTRUCTIONS
You may shower starting now    Call if any signs or symptoms of infection [phone] ext: [phone]  Fevers, chills, redness, swelling or drainage.       Hook up home  Monitor  [employer] Stay connected: [phone]

## 2024-02-26 ENCOUNTER — OFFICE VISIT (OUTPATIENT)
Dept: VASCULAR SURGERY | Age: 79
End: 2024-02-26

## 2024-02-26 VITALS — WEIGHT: 150 LBS | BODY MASS INDEX: 22.15 KG/M2

## 2024-02-26 DIAGNOSIS — N18.6 ENCOUNTER REGARDING VASCULAR ACCESS FOR DIALYSIS FOR ESRD (HCC): Primary | ICD-10-CM

## 2024-02-26 DIAGNOSIS — Z99.2 ENCOUNTER REGARDING VASCULAR ACCESS FOR DIALYSIS FOR ESRD (HCC): Primary | ICD-10-CM

## 2024-02-26 PROCEDURE — 99024 POSTOP FOLLOW-UP VISIT: CPT | Performed by: SURGERY

## 2024-02-26 NOTE — PROGRESS NOTES
2/26/2024    Cleveland Thompson  1945    PCP : Rafal Weber MD  Nephrologist : Dr. Aguirre  Dialysis Center : [facility]     Previous Vascular Procedures   3/2023 R IJ tunn hd cath   7/14/23 L RC AVF   11/18/23 L BC AVF   1/2024 Tunn HD line removed - access center      Patient returns for post operative evaluation.      The patient access had been working well on dialysis.  He had his catheter removed at access center.  On 2/24/24 he had some issues with use of his access.  Per his report it was a new tech.      He is having some soreness and pain in the left upper arm after that.  It has been improving.    He denies any symptoms of left hand pain.  He does have chronic discoloration of both hands, fingers but does not have significant symptoms associated with it, and it seems to be associated with cold weather.    PE  Gen NAD Awake and Alert  left Upper Extremity  Fistula has palpable  thrill, and a bruit is auscultated  Small hematoma appreciated in mid upper arm  There is no obvious stenosis on us  Brachial 3 Radial weakly biphasic   Ulnar weakly biphasic Palmar weakly biphasic  Wound is clean, dry and intact  with no evidence of cellulitis or drainage  No deficits in sensation or motor    Bilateral 3rd, 4th and 5th finger cyanosis    A/P Dialysis Access  left BC AV fistula  wound is healing well  no evidence of steal syndrome  av access is patent  Hypotension is much improved - not needing to take midodrine regularly  Ok to continue use of access  Suspect it was who cannulate it - new tech  Disc with dialysis center charge nurse Eryn - who stated it had been running well last week - but she was not there on saturday  She will call with any issues - would schedule fistulagram if an issue  Return in 6 months for follow-up office visit.      Mariana Gutierrez MD

## 2024-02-27 ENCOUNTER — TELEPHONE (OUTPATIENT)
Dept: NON INVASIVE DIAGNOSTICS | Age: 79
End: 2024-02-27

## 2024-02-27 NOTE — TELEPHONE ENCOUNTER
[employer] transmission has sent nocturnal bradycardia for the past several nights.  See Murj report for details.  Called patient today and he feels overall good.  He does not note any increased symptoms and has not had any increased lightheadedness, near-syncope or syncope.  He wakes up and gets his coffee and feels back to himself when he wakes up from sleep.  No incidents during the night either.  Alejandrina Ramos, APRN - CNP

## 2024-04-19 ENCOUNTER — TELEPHONE (OUTPATIENT)
Dept: NON INVASIVE DIAGNOSTICS | Age: 79
End: 2024-04-19

## 2024-04-19 NOTE — TELEPHONE ENCOUNTER
----- Message from Johnson Gonsalves MD sent at 4/16/2024 12:38 PM EDT -----  · Stored EGMs are consistent with or suggestive of a Pause  · Total episodes: 1  · Pause detected 13-Apr-2024 during sleep hours  · duration 4 seconds  · Implant indication syncope and bradycardia  · Battery status Good  · Arrhythmias : bradycardia  · Symptom transmissions none  · Presenting rhythm SB, PVC  · HR histograms show normal HR distribution  · PVCs (% beats) 1.4%  Additional Notes:  Routine ILR remote device check with pause alert.      Any symptoms? Thanks.

## 2024-04-19 NOTE — TELEPHONE ENCOUNTER
Called and spoke with patient. Patient denies any symptoms of lightlessness, dizziness or syncope.       Plan: will continue to monitor.       Eryn BRICENO,RN   [facility] Heart and Vascular [place]   Device Clinic

## 2024-06-12 ENCOUNTER — TELEPHONE (OUTPATIENT)
Dept: NON INVASIVE DIAGNOSTICS | Age: 79
End: 2024-06-12

## 2024-06-12 PROCEDURE — 93298 REM INTERROG DEV EVAL SCRMS: CPT | Performed by: INTERNAL MEDICINE

## 2024-06-12 NOTE — TELEPHONE ENCOUNTER
Contacted patient to schedule office visit and device check.  CK patient.  Patient only able to come Monday, Wednesday or Friday due to dialysis. Patient has MDT.  No openings until Monday, 12/9/24.  Friday, 10/18/2024 is other option.  Also patient needs an afternoon appointment, he is not a morning person.  Please review and advise.

## 2024-06-12 NOTE — TELEPHONE ENCOUNTER
See murj:   1 pause episode on 6.11.2024 @ 0701. Duration 3 seconds.   Called and spoken with patient. He states that he was sleeping during that time. He states he has been feeling well overall and denies any dizziness and lightheadedness.     Plan:   Will continue to monitor.   Patient is also due for an office visit. Forwarding to scheduling        Eryn BRICENO,RN   [facility] Heart and Vascular [place]   Device Clinic

## 2024-06-13 NOTE — TELEPHONE ENCOUNTER
Alejandrina Ramos, APRN - CNP   to Me       6/12/24 10:31 AM   He was seen in January for implant of ILR> no rush for appointment, can schedule what is available unless he has symptoms.  Thanks

## 2024-06-23 ENCOUNTER — APPOINTMENT (OUTPATIENT)
Dept: CT IMAGING | Age: 79
End: 2024-06-23
Payer: MEDICARE

## 2024-06-23 ENCOUNTER — HOSPITAL ENCOUNTER (EMERGENCY)
Age: 79
Discharge: HOME | End: 2024-06-23
Attending: EMERGENCY MEDICINE
Payer: MEDICARE

## 2024-06-23 VITALS
TEMPERATURE: 97.9 F | HEIGHT: 69 IN | SYSTOLIC BLOOD PRESSURE: 125 MMHG | DIASTOLIC BLOOD PRESSURE: 53 MMHG | OXYGEN SATURATION: 100 % | BODY MASS INDEX: 21.48 KG/M2 | RESPIRATION RATE: 16 BRPM | HEART RATE: 48 BPM | WEIGHT: 145 LBS

## 2024-06-23 DIAGNOSIS — S22.32XA CLOSED FRACTURE OF ONE RIB OF LEFT SIDE, INITIAL ENCOUNTER: ICD-10-CM

## 2024-06-23 DIAGNOSIS — S20.212A CONTUSION OF LEFT FRONT WALL OF THORAX, INITIAL ENCOUNTER: ICD-10-CM

## 2024-06-23 DIAGNOSIS — S01.81XA LACERATION OF CHIN WITHOUT COMPLICATION, INITIAL ENCOUNTER: ICD-10-CM
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
## 2024-06-23 LAB
ANION GAP SERPL CALCULATED.3IONS-SCNC: 12 MMOL/L (ref 7–16)
BUN SERPL-MCNC: 23 MG/DL (ref 6–23)
CALCIUM SERPL-MCNC: 9.1 MG/DL (ref 8.6–10.2)
CHLORIDE SERPL-SCNC: 94 MMOL/L (ref 98–107)
CO2 SERPL-SCNC: 29 MMOL/L (ref 22–29)
CREAT SERPL-MCNC: 5.2 MG/DL (ref 0.7–1.2)
ERYTHROCYTE [DISTWIDTH] IN BLOOD BY AUTOMATED COUNT: 14.5 % (ref 11.5–15)
GFR, ESTIMATED: 11 ML/MIN/1.73M2
GLUCOSE SERPL-MCNC: 127 MG/DL (ref 74–99)
HCT VFR BLD AUTO: 38.4 % (ref 37–54)
HGB BLD-MCNC: 11.8 G/DL (ref 12.5–16.5)
MCH RBC QN AUTO: 32.2 PG (ref 26–35)
MCHC RBC AUTO-ENTMCNC: 30.7 G/DL (ref 32–34.5)
MCV RBC AUTO: 104.9 FL (ref 80–99.9)
PLATELET # BLD AUTO: 182 K/UL (ref 130–450)
PMV BLD AUTO: 10.5 FL (ref 7–12)
POTASSIUM SERPL-SCNC: 5.2 MMOL/L (ref 3.5–5)
RBC # BLD AUTO: 3.66 M/UL (ref 3.8–5.8)
SODIUM SERPL-SCNC: 135 MMOL/L (ref 132–146)
WBC OTHER # BLD: 12.3 K/UL (ref 4.5–11.5)

## 2024-06-23 PROCEDURE — 99284 EMERGENCY DEPT VISIT MOD MDM: CPT

## 2024-06-23 PROCEDURE — 6370000000 HC RX 637 (ALT 250 FOR IP): Performed by: EMERGENCY MEDICINE

## 2024-06-23 PROCEDURE — 70450 CT HEAD/BRAIN W/O DYE: CPT

## 2024-06-23 PROCEDURE — 90715 TDAP VACCINE 7 YRS/> IM: CPT | Performed by: EMERGENCY MEDICINE

## 2024-06-23 PROCEDURE — 12002 RPR S/N/AX/GEN/TRNK2.6-7.5CM: CPT

## 2024-06-23 PROCEDURE — 71250 CT THORAX DX C-: CPT

## 2024-06-23 PROCEDURE — 90471 IMMUNIZATION ADMIN: CPT | Performed by: EMERGENCY MEDICINE

## 2024-06-23 PROCEDURE — 6360000002 HC RX W HCPCS: Performed by: EMERGENCY MEDICINE

## 2024-06-23 PROCEDURE — 80048 BASIC METABOLIC PNL TOTAL CA: CPT

## 2024-06-23 PROCEDURE — 85027 COMPLETE CBC AUTOMATED: CPT

## 2024-06-23 PROCEDURE — 72125 CT NECK SPINE W/O DYE: CPT

## 2024-06-23 RX ORDER — HYDROCODONE BITARTRATE AND ACETAMINOPHEN 5; 325 MG/1; MG/1
1 TABLET ORAL ONCE
Status: COMPLETED | OUTPATIENT
Start: 2024-06-23 | End: 2024-06-23

## 2024-06-23 RX ORDER — HYDROCODONE BITARTRATE AND ACETAMINOPHEN 5; 325 MG/1; MG/1
1 TABLET ORAL EVERY 6 HOURS PRN
Qty: 12 TABLET | Refills: 0 | Status: SHIPPED | OUTPATIENT
Start: 2024-06-23 | End: 2024-06-26

## 2024-06-23 RX ADMIN — TETANUS TOXOID, REDUCED DIPHTHERIA TOXOID AND ACELLULAR PERTUSSIS VACCINE, ADSORBED 0.5 ML: 5; 2.5; 8; 8; 2.5 SUSPENSION INTRAMUSCULAR at 17:12

## 2024-06-23 RX ADMIN — HYDROCODONE BITARTRATE AND ACETAMINOPHEN 1 TABLET: 5; 325 TABLET ORAL at 17:05

## 2024-06-23 ASSESSMENT — PAIN - FUNCTIONAL ASSESSMENT: PAIN_FUNCTIONAL_ASSESSMENT: NONE - DENIES PAIN

## 2024-06-23 NOTE — ED PROVIDER NOTES
HPI:  6/23/24, Time: 4:26 PM EDT         Cleveland Thompson is a 78 y.o. male presenting to the ED for patient had mechanical fall at home found on the floor by his wife striking his chin and chest on the floor.  No loss of consciousness.  Complains of pain in his chin and left upper chest pain.  Does have pain with deep breathing.  He has some mild shortness of breath.  Patient has a history of aneurysm of aorta DVT atrial fibrillation is currently on Eliquis.  Wife states he also has low blood pressure he does take midodrine 3 times a day.  No fevers or chills reported.  He has no nausea vomiting or headache., beginning several minutes ago.  The complaint has been persistent, mild in severity, and worsened by nothing.  Patient is history of hemodialysis.  He goes Monday Wednesday Friday.  He denies any nausea hemoptysis.    Review of Systems:   A complete review of systems was performed and pertinent positives and negatives are stated within HPI, all other systems reviewed and are negative.    --------------------------------------------- PAST HISTORY ---------------------------------------------  Past Medical History:  has a past medical history of Aneurysm of abdominal aorta (HCC), Deep vein thrombosis of calf (HCC), Gout, History of DVT (deep vein thrombosis), History of tobacco use, Hypertension, Iliac artery aneurysm, bilateral (HCC), Loss of weight, and Right leg DVT (HCC).    Past Surgical History:  has a past surgical history that includes hernia repair; Tonsillectomy; Abdominal aortic aneurysm repair; open thoracic aortic aneurysm repair (Bilateral, 2018); vascular surgery (Left, 07/14/2023); Dialysis fistula creation (Left, 11/17/2023); and ep device procedure (N/A, 01/26/2024).    Social History:  reports that he quit smoking about 29 years ago. His smoking use included cigarettes. He has never used smokeless tobacco. He reports current alcohol use. He reports that he does not use drugs.    Family

## 2024-07-08 RX ORDER — MIDODRINE HYDROCHLORIDE 2.5 MG/1
2.5 TABLET ORAL 2 TIMES DAILY
Qty: 120 TABLET | Refills: 1 | Status: SHIPPED | OUTPATIENT
Start: 2024-07-08

## 2024-08-14 ENCOUNTER — OFFICE VISIT (OUTPATIENT)
Dept: CARDIOLOGY CLINIC | Age: 79
End: 2024-08-14
Payer: MEDICARE

## 2024-08-14 VITALS
BODY MASS INDEX: 21.62 KG/M2 | RESPIRATION RATE: 17 BRPM | DIASTOLIC BLOOD PRESSURE: 68 MMHG | SYSTOLIC BLOOD PRESSURE: 106 MMHG | HEART RATE: 49 BPM | WEIGHT: 146 LBS | HEIGHT: 69 IN

## 2024-08-14 DIAGNOSIS — R55 SYNCOPE AND COLLAPSE: ICD-10-CM

## 2024-08-14 DIAGNOSIS — R00.1 BRADYCARDIA: ICD-10-CM

## 2024-08-14 DIAGNOSIS — R09.89 LEFT CAROTID BRUIT: Primary | ICD-10-CM

## 2024-08-14 PROCEDURE — 93000 ELECTROCARDIOGRAM COMPLETE: CPT | Performed by: INTERNAL MEDICINE

## 2024-08-14 PROCEDURE — 1124F ACP DISCUSS-NO DSCNMKR DOCD: CPT | Performed by: INTERNAL MEDICINE

## 2024-08-14 PROCEDURE — 99214 OFFICE O/P EST MOD 30 MIN: CPT | Performed by: INTERNAL MEDICINE

## 2024-08-14 RX ORDER — MIDODRINE HYDROCHLORIDE 2.5 MG/1
2.5 TABLET ORAL 2 TIMES DAILY
Qty: 120 TABLET | Refills: 5 | Status: SHIPPED | OUTPATIENT
Start: 2024-08-14

## 2024-08-14 ASSESSMENT — ENCOUNTER SYMPTOMS
BLOOD IN STOOL: 0
SHORTNESS OF BREATH: 0
COUGH: 0
DIARRHEA: 0
BACK PAIN: 0
VOMITING: 0
WHEEZING: 0
NAUSEA: 0
CONSTIPATION: 0
ABDOMINAL PAIN: 0

## 2024-08-14 NOTE — PROGRESS NOTES
Connections: Not on file   Intimate Partner Violence: Not on file   Housing Stability: Not on file       Allergies:  Allergies   Allergen Reactions    Penicillins     Seasonal        Current Medications:  Current Outpatient Medications   Medication Sig Dispense Refill    midodrine (PROAMATINE) 2.5 MG tablet Take 1 tablet by mouth in the morning and at bedtime Takes 5 mg twice daily on dialysis days. 120 tablet 1    rosuvastatin (CRESTOR) 10 MG tablet TAKE ONE TABLET BY MOUTH EVERY EVENING      Apoaequorin (PREVAGEN PO) Take 1 capsule by mouth      apixaban (ELIQUIS) 2.5 MG TABS tablet Take 1 tablet by mouth 2 times daily      Probiotic Product (PROBIOTIC BLEND PO) Take by mouth      allopurinol (ZYLOPRIM) 100 MG tablet Take 1 tablet by mouth daily       No current facility-administered medications for this visit.       Physical Exam:  There were no vitals taken for this visit.  Wt Readings from Last 3 Encounters:   06/23/24 65.8 kg (145 lb)   02/26/24 68 kg (150 lb)   01/26/24 64.4 kg (142 lb)       Physical Exam  Constitutional:       General: He is not in acute distress.     Appearance: He is well-developed.   HENT:      Head: Normocephalic and atraumatic.   Neck:      Vascular: No carotid bruit or JVD.   Cardiovascular:      Rate and Rhythm: Regular rhythm. Bradycardia present.      Heart sounds: No murmur heard.     No friction rub. No gallop.      Comments: Distant heart sounds with 1/6 systolic murmur best heard at the left lower sternal border.  Pulmonary:      Breath sounds: Normal breath sounds. No wheezing or rales.   Chest:      Chest wall: No tenderness.   Abdominal:      General: Bowel sounds are normal. There is no distension.      Palpations: Abdomen is soft. There is no mass.      Tenderness: There is no abdominal tenderness.      Comments: No abdominal bruit.   Musculoskeletal:      Cervical back: Neck supple.      Right lower leg: No edema.      Left lower leg: No edema.   Skin:     General: Skin

## 2024-08-26 ENCOUNTER — OFFICE VISIT (OUTPATIENT)
Dept: VASCULAR SURGERY | Age: 79
End: 2024-08-26

## 2024-08-26 VITALS — WEIGHT: 150 LBS | BODY MASS INDEX: 22.15 KG/M2

## 2024-08-26 DIAGNOSIS — Z99.2 ENCOUNTER REGARDING VASCULAR ACCESS FOR DIALYSIS FOR ESRD (HCC): Primary | ICD-10-CM

## 2024-08-26 DIAGNOSIS — N18.6 ENCOUNTER REGARDING VASCULAR ACCESS FOR DIALYSIS FOR ESRD (HCC): Primary | ICD-10-CM

## 2024-08-26 NOTE — PROGRESS NOTES
Vascular Surgery Outpatient Followup    PCP : Rafal Weber MD  Nephrologist : Dr. Aguirre  Dialysis Center : [facility]     Previous Vascular Procedures   3/2023 R IJ tunn hd cath   7/14/23 L RC AVF   11/18/23 L BC AVF   1/2024 Tunn HD line removed - access center   4/5/24 L UE fistulogram, plasty proximal cephalic vein and juxta anastomosis at [facility]   5/29/24 L UE fistulogram, plasty proximal cephalic vein at [facility]       HISTORY OF PRESENT ILLNESS:    The patient is a 79 y.o. male who is here in regards to follow up of their previously placed left brachiocephalic AVF.        The patient states that his fistula had been functioning well until last Thursday when he developed bleeding following the procedure. The patient was then referred to the vascular access center and has an appointment in the morning for a fistulogram there.     The patient states he underwent dialysis Saturday without any issues.     He has been to the access center twice since he was last seen for fistuloplasty.     Past Medical History:        Diagnosis Date    Aneurysm of abdominal aorta ([facility]) 9/27/2012    Deep vein thrombosis of calf ([facility])     R calf vein thrombosis involving post tibial vein with extension in close proximity  to popliteal vein    Gout     History of DVT (deep vein thrombosis) 3/12/2015    History of tobacco use 7/27/2017    Hypertension     Iliac artery aneurysm, bilateral ([facility]) 6/29/2017    Loss of weight     diet and exercise    Right leg DVT ([facility]) 3/12/2015     Past Surgical History:        Procedure Laterality Date    ABDOMINAL AORTIC ANEURYSM REPAIR      2018    DIALYSIS FISTULA CREATION Left 11/17/2023    creation AVF left arm performed by Mariana Gutierrez MD at [facility] OR    EP DEVICE PROCEDURE N/A 01/26/2024    [employer] LINQ insert performed by Johnson Gonsalves MD at [facility] CARDIAC CATH LAB    HERNIA REPAIR      OPEN REPAIR PERIPHERAL ANEURYSM Bilateral 2018    TONSILLECTOMY      VASCULAR  SURGERY Left 2023    insertion AVF left arm performed by Mariana Gutierrez MD at [facility] OR     Current Medications:   Current Outpatient Medications   Medication Sig Dispense Refill    midodrine (PROAMATINE) 2.5 MG tablet Take 1 tablet by mouth in the morning and at bedtime Takes 5 mg twice daily on dialysis days. 120 tablet 5    rosuvastatin (CRESTOR) 10 MG tablet TAKE ONE TABLET BY MOUTH EVERY EVENING      Apoaequorin (PREVAGEN PO) Take 1 capsule by mouth      apixaban (ELIQUIS) 2.5 MG TABS tablet Take 1 tablet by mouth 2 times daily      Probiotic Product (PROBIOTIC BLEND PO) Take by mouth      allopurinol (ZYLOPRIM) 100 MG tablet Take 1 tablet by mouth daily       No current facility-administered medications for this visit.     Allergies:  Penicillins and Seasonal  Social History     Socioeconomic History    Marital status:      Spouse name: Not on file    Number of children: Not on file    Years of education: Not on file    Highest education level: Not on file   Occupational History    Not on file   Tobacco Use    Smoking status: Former     Current packs/day: 0.00     Types: Cigarettes     Quit date: 3/12/1995     Years since quittin.4    Smokeless tobacco: Never   Vaping Use    Vaping status: Never Used   Substance and Sexual Activity    Alcohol use: Yes     Comment: socially    Drug use: Never    Sexual activity: Not on file   Other Topics Concern    Not on file   Social History Narrative    1 -2 cup coffee, 1 tea at night     Social Determinants of Health     Financial Resource Strain: Not on file   Food Insecurity: Not on file   Transportation Needs: Not on file   Physical Activity: Not on file   Stress: Not on file   Social Connections: Not on file   Intimate Partner Violence: Not on file   Housing Stability: Not on file     No family history on file.  Labs  Lab Results   Component Value Date    WBC 12.3 (H) 2024    HGB 11.8 (L) 2024    HCT 38.4 2024

## 2024-10-28 ENCOUNTER — APPOINTMENT (OUTPATIENT)
Dept: CT IMAGING | Age: 79
End: 2024-10-28
Payer: MEDICARE

## 2024-10-28 ENCOUNTER — HOSPITAL ENCOUNTER (INPATIENT)
Age: 79
LOS: 2 days | Discharge: NURSING, REHAB OR LONG-TERM CARE | End: 2024-10-31
Attending: EMERGENCY MEDICINE | Admitting: SURGERY
Payer: MEDICARE

## 2024-10-28 ENCOUNTER — APPOINTMENT (OUTPATIENT)
Dept: GENERAL RADIOLOGY | Age: 79
End: 2024-10-28
Payer: MEDICARE

## 2024-10-28 ENCOUNTER — HOSPITAL ENCOUNTER (EMERGENCY)
Age: 79
Discharge: ANOTHER HOSPITAL | End: 2024-10-28
Attending: EMERGENCY MEDICINE
Payer: MEDICARE

## 2024-10-28 VITALS
OXYGEN SATURATION: 99 % | BODY MASS INDEX: 20.76 KG/M2 | WEIGHT: 145 LBS | TEMPERATURE: 97.9 F | HEART RATE: 72 BPM | HEIGHT: 70 IN | RESPIRATION RATE: 17 BRPM | DIASTOLIC BLOOD PRESSURE: 68 MMHG | SYSTOLIC BLOOD PRESSURE: 129 MMHG
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
## 2024-10-28 DIAGNOSIS — S12.391A OTHER CLOSED NONDISPLACED FRACTURE OF FOURTH CERVICAL VERTEBRA, INITIAL ENCOUNTER (HCC): Primary | ICD-10-CM

## 2024-10-28 DIAGNOSIS — S12.301A CLOSED NONDISPLACED FRACTURE OF FOURTH CERVICAL VERTEBRA, UNSPECIFIED FRACTURE MORPHOLOGY, INITIAL ENCOUNTER (HCC): ICD-10-CM

## 2024-10-28 LAB
ANION GAP SERPL CALCULATED.3IONS-SCNC: 15 MMOL/L (ref 7–16)
BASOPHILS # BLD: 0.06 K/UL (ref 0–0.2)
BASOPHILS NFR BLD: 1 % (ref 0–2)
BUN SERPL-MCNC: 37 MG/DL (ref 6–23)
CALCIUM SERPL-MCNC: 9.4 MG/DL (ref 8.6–10.2)
CHLORIDE SERPL-SCNC: 96 MMOL/L (ref 98–107)
CO2 SERPL-SCNC: 29 MMOL/L (ref 22–29)
CREAT SERPL-MCNC: 7.2 MG/DL (ref 0.7–1.2)
EOSINOPHIL # BLD: 0.12 K/UL (ref 0.05–0.5)
EOSINOPHILS RELATIVE PERCENT: 1 % (ref 0–6)
ERYTHROCYTE [DISTWIDTH] IN BLOOD BY AUTOMATED COUNT: 15.6 % (ref 11.5–15)
GFR, ESTIMATED: 7 ML/MIN/1.73M2
GLUCOSE SERPL-MCNC: 130 MG/DL (ref 74–99)
HCT VFR BLD AUTO: 39.9 % (ref 37–54)
HGB BLD-MCNC: 12.5 G/DL (ref 12.5–16.5)
IMM GRANULOCYTES # BLD AUTO: 0.05 K/UL (ref 0–0.58)
IMM GRANULOCYTES NFR BLD: 1 % (ref 0–5)
LYMPHOCYTES NFR BLD: 1.89 K/UL (ref 1.5–4)
LYMPHOCYTES RELATIVE PERCENT: 22 % (ref 20–42)
MCH RBC QN AUTO: 32.1 PG (ref 26–35)
MCHC RBC AUTO-ENTMCNC: 31.3 G/DL (ref 32–34.5)
MCV RBC AUTO: 102.6 FL (ref 80–99.9)
MONOCYTES NFR BLD: 0.84 K/UL (ref 0.1–0.95)
MONOCYTES NFR BLD: 10 % (ref 2–12)
NEUTROPHILS NFR BLD: 66 % (ref 43–80)
NEUTS SEG NFR BLD: 5.73 K/UL (ref 1.8–7.3)
PLATELET # BLD AUTO: 165 K/UL (ref 130–450)
PMV BLD AUTO: 10.3 FL (ref 7–12)
POTASSIUM SERPL-SCNC: 4.2 MMOL/L (ref 3.5–5)
RBC # BLD AUTO: 3.89 M/UL (ref 3.8–5.8)
SODIUM SERPL-SCNC: 140 MMOL/L (ref 132–146)
TROPONIN I SERPL HS-MCNC: 114 NG/L (ref 0–11)
TROPONIN I SERPL HS-MCNC: 116 NG/L (ref 0–11)
WBC OTHER # BLD: 8.7 K/UL (ref 4.5–11.5)

## 2024-10-28 PROCEDURE — 71250 CT THORAX DX C-: CPT

## 2024-10-28 PROCEDURE — 80048 BASIC METABOLIC PNL TOTAL CA: CPT

## 2024-10-28 PROCEDURE — 85025 COMPLETE CBC W/AUTO DIFF WBC: CPT

## 2024-10-28 PROCEDURE — 99285 EMERGENCY DEPT VISIT HI MDM: CPT

## 2024-10-28 PROCEDURE — 96374 THER/PROPH/DIAG INJ IV PUSH: CPT

## 2024-10-28 PROCEDURE — 84484 ASSAY OF TROPONIN QUANT: CPT

## 2024-10-28 PROCEDURE — 6360000002 HC RX W HCPCS: Performed by: EMERGENCY MEDICINE

## 2024-10-28 PROCEDURE — 70450 CT HEAD/BRAIN W/O DYE: CPT

## 2024-10-28 PROCEDURE — 93005 ELECTROCARDIOGRAM TRACING: CPT

## 2024-10-28 PROCEDURE — 72125 CT NECK SPINE W/O DYE: CPT

## 2024-10-28 PROCEDURE — 71101 X-RAY EXAM UNILAT RIBS/CHEST: CPT

## 2024-10-28 RX ORDER — FENTANYL CITRATE 50 UG/ML
50 INJECTION, SOLUTION INTRAMUSCULAR; INTRAVENOUS ONCE
Status: COMPLETED | OUTPATIENT
Start: 2024-10-28 | End: 2024-10-28

## 2024-10-28 RX ADMIN — FENTANYL CITRATE 50 MCG: 50 INJECTION INTRAMUSCULAR; INTRAVENOUS at 22:20

## 2024-10-28 ASSESSMENT — PAIN SCALES - GENERAL
PAINLEVEL_OUTOF10: 5
PAINLEVEL_OUTOF10: 2

## 2024-10-28 ASSESSMENT — PAIN - FUNCTIONAL ASSESSMENT
PAIN_FUNCTIONAL_ASSESSMENT: NONE - DENIES PAIN
PAIN_FUNCTIONAL_ASSESSMENT: 0-10

## 2024-10-28 ASSESSMENT — PAIN DESCRIPTION - LOCATION: LOCATION: NECK

## 2024-10-28 NOTE — ED PROVIDER NOTES
TIME (.cct)       PAST MEDICAL HISTORY/Chronic Conditions Affecting Care      has a past medical history of Aneurysm of abdominal aorta (HCC) (9/27/2012), Deep vein thrombosis of calf (HCC), Gout, History of DVT (deep vein thrombosis) (3/12/2015), History of tobacco use (7/27/2017), Hypertension, Iliac artery aneurysm, bilateral (HCC) (6/29/2017), Loss of weight, and Right leg DVT (HCC) (3/12/2015).     EMERGENCY DEPARTMENT COURSE    Vitals:    Vitals:    10/28/24 1537 10/28/24 1824 10/28/24 2137   BP: 115/61 125/68 129/68   Pulse: 63 65 72   Resp: 16 17    Temp: 97.9 °F (36.6 °C)     SpO2: 100% 98% 99%   Weight: 65.8 kg (145 lb)     Height: 1.778 m (5' 10\")         Patient was given the following medications:  Medications   fentaNYL (SUBLIMAZE) injection 50 mcg (50 mcg IntraVENous Given 10/28/24 2220)           Is this patient to be included in the SEP-1 Core Measure due to severe sepsis or septic shock?   No   Exclusion criteria - the patient is NOT to be included for SEP-1 Core Measure due to:  Infection is not suspected        Medical Decision Making/Differential Diagnosis:    CC/HPI Summary, Social Determinants of health, Records Reviewed, DDx, testing done/not done, ED Course, Reassessment, disposition considerations/shared decision making with patient, consults, disposition:      ED Course as of 10/28/24 2359   Mon Oct 28, 2024   1602 EKG:  This EKG is signed and interpreted by the EP.    Time: 15:55  Rate: 57  Rhythm: Sinus  Interpretation: sinus bradycardia and 1st degree AV block  Comparison: stable as compared to patient's most recent EKG   [CF]   1926 Dr. Blair accepts for ER to ER transfer for trauma eval [KM]      ED Course User Index  [CF] Jair Brady DO  [KM] Guerline Bhatia MD      He is an 79-year-old male with a history of ESRD on dialysis Tuesday, Thursday, Saturday with last dialysis on Saturday followed by Dr. Aguirre, hypotension on midodrine, HTN, DVT on Eliquis, bilateral iliac artery

## 2024-10-29 ENCOUNTER — APPOINTMENT (OUTPATIENT)
Dept: CT IMAGING | Age: 79
End: 2024-10-29
Payer: MEDICARE

## 2024-10-29 PROBLEM — S12.9XXA CERVICAL COMPRESSION FRACTURE, INITIAL ENCOUNTER (HCC): Status: ACTIVE | Noted: 2024-10-29

## 2024-10-29 PROBLEM — S12.301A CLOSED NONDISPLACED FRACTURE OF FOURTH CERVICAL VERTEBRA (HCC): Status: ACTIVE | Noted: 2024-10-29
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
## 2024-10-29 LAB
ANION GAP SERPL CALCULATED.3IONS-SCNC: 12 MMOL/L (ref 7–16)
BUN SERPL-MCNC: 40 MG/DL (ref 6–23)
CALCIUM SERPL-MCNC: 8.8 MG/DL (ref 8.6–10.2)
CHLORIDE SERPL-SCNC: 96 MMOL/L (ref 98–107)
CO2 SERPL-SCNC: 28 MMOL/L (ref 22–29)
CREAT SERPL-MCNC: 7.9 MG/DL (ref 0.7–1.2)
EKG ATRIAL RATE: 57 BPM
EKG P AXIS: 89 DEGREES
EKG P-R INTERVAL: 212 MS
EKG Q-T INTERVAL: 462 MS
EKG QRS DURATION: 64 MS
EKG QTC CALCULATION (BAZETT): 449 MS
EKG R AXIS: 18 DEGREES
EKG T AXIS: 34 DEGREES
EKG VENTRICULAR RATE: 57 BPM
ETHANOLAMINE SERPL-MCNC: <10 MG/DL (ref 0–0.08)
GFR, ESTIMATED: 6 ML/MIN/1.73M2
GLUCOSE SERPL-MCNC: 101 MG/DL (ref 74–99)
POTASSIUM SERPL-SCNC: 4.8 MMOL/L (ref 3.5–5)
SODIUM SERPL-SCNC: 136 MMOL/L (ref 132–146)

## 2024-10-29 PROCEDURE — 90935 HEMODIALYSIS ONE EVALUATION: CPT

## 2024-10-29 PROCEDURE — 6370000000 HC RX 637 (ALT 250 FOR IP)

## 2024-10-29 PROCEDURE — 6370000000 HC RX 637 (ALT 250 FOR IP): Performed by: SURGERY

## 2024-10-29 PROCEDURE — G0480 DRUG TEST DEF 1-7 CLASSES: HCPCS

## 2024-10-29 PROCEDURE — 70498 CT ANGIOGRAPHY NECK: CPT

## 2024-10-29 PROCEDURE — 1200000000 HC SEMI PRIVATE

## 2024-10-29 PROCEDURE — 2580000003 HC RX 258

## 2024-10-29 PROCEDURE — 80048 BASIC METABOLIC PNL TOTAL CA: CPT

## 2024-10-29 PROCEDURE — 5A1D70Z PERFORMANCE OF URINARY FILTRATION, INTERMITTENT, LESS THAN 6 HOURS PER DAY: ICD-10-PCS | Performed by: INTERNAL MEDICINE

## 2024-10-29 PROCEDURE — 99223 1ST HOSP IP/OBS HIGH 75: CPT | Performed by: SURGERY

## 2024-10-29 PROCEDURE — 93010 ELECTROCARDIOGRAM REPORT: CPT | Performed by: INTERNAL MEDICINE

## 2024-10-29 PROCEDURE — 6360000004 HC RX CONTRAST MEDICATION: Performed by: RADIOLOGY

## 2024-10-29 PROCEDURE — 99222 1ST HOSP IP/OBS MODERATE 55: CPT | Performed by: NEUROLOGICAL SURGERY

## 2024-10-29 RX ORDER — ONDANSETRON 4 MG/1
4 TABLET, ORALLY DISINTEGRATING ORAL EVERY 8 HOURS PRN
Status: DISCONTINUED | OUTPATIENT
Start: 2024-10-29 | End: 2024-11-01 | Stop reason: HOSPADM

## 2024-10-29 RX ORDER — SODIUM CHLORIDE 0.9 % (FLUSH) 0.9 %
10 SYRINGE (ML) INJECTION
Status: ACTIVE | OUTPATIENT
Start: 2024-10-29 | End: 2024-10-30

## 2024-10-29 RX ORDER — SODIUM CHLORIDE 0.9 % (FLUSH) 0.9 %
10 SYRINGE (ML) INJECTION PRN
Status: DISCONTINUED | OUTPATIENT
Start: 2024-10-29 | End: 2024-11-01 | Stop reason: HOSPADM

## 2024-10-29 RX ORDER — SODIUM CHLORIDE 0.9 % (FLUSH) 0.9 %
10 SYRINGE (ML) INJECTION EVERY 12 HOURS SCHEDULED
Status: DISCONTINUED | OUTPATIENT
Start: 2024-10-29 | End: 2024-11-01 | Stop reason: HOSPADM

## 2024-10-29 RX ORDER — BACITRACIN ZINC 500 [USP'U]/G
OINTMENT TOPICAL 3 TIMES DAILY
Status: DISCONTINUED | OUTPATIENT
Start: 2024-10-29 | End: 2024-10-29

## 2024-10-29 RX ORDER — ALLOPURINOL 100 MG/1
100 TABLET ORAL DAILY
Status: DISCONTINUED | OUTPATIENT
Start: 2024-10-29 | End: 2024-11-01 | Stop reason: HOSPADM

## 2024-10-29 RX ORDER — SODIUM CHLORIDE 9 MG/ML
INJECTION, SOLUTION INTRAVENOUS PRN
Status: DISCONTINUED | OUTPATIENT
Start: 2024-10-29 | End: 2024-11-01 | Stop reason: HOSPADM

## 2024-10-29 RX ORDER — BACITRACIN ZINC 500 [USP'U]/G
OINTMENT TOPICAL 3 TIMES DAILY
Status: DISCONTINUED | OUTPATIENT
Start: 2024-10-29 | End: 2024-11-01 | Stop reason: HOSPADM

## 2024-10-29 RX ORDER — MIDODRINE HYDROCHLORIDE 2.5 MG/1
2.5 TABLET ORAL 2 TIMES DAILY
Status: DISCONTINUED | OUTPATIENT
Start: 2024-10-29 | End: 2024-11-01 | Stop reason: HOSPADM

## 2024-10-29 RX ORDER — POLYETHYLENE GLYCOL 3350 17 G/17G
17 POWDER, FOR SOLUTION ORAL DAILY PRN
Status: DISCONTINUED | OUTPATIENT
Start: 2024-10-29 | End: 2024-11-01 | Stop reason: HOSPADM

## 2024-10-29 RX ORDER — ROSUVASTATIN CALCIUM 20 MG/1
10 TABLET, COATED ORAL NIGHTLY
Status: DISCONTINUED | OUTPATIENT
Start: 2024-10-29 | End: 2024-11-01 | Stop reason: HOSPADM

## 2024-10-29 RX ORDER — ONDANSETRON 2 MG/ML
4 INJECTION INTRAMUSCULAR; INTRAVENOUS EVERY 6 HOURS PRN
Status: DISCONTINUED | OUTPATIENT
Start: 2024-10-29 | End: 2024-11-01 | Stop reason: HOSPADM

## 2024-10-29 RX ORDER — METHOCARBAMOL 750 MG/1
750 TABLET, FILM COATED ORAL 4 TIMES DAILY
Status: DISCONTINUED | OUTPATIENT
Start: 2024-10-29 | End: 2024-11-01 | Stop reason: HOSPADM

## 2024-10-29 RX ORDER — ACETAMINOPHEN 325 MG/1
650 TABLET ORAL EVERY 4 HOURS PRN
Status: DISCONTINUED | OUTPATIENT
Start: 2024-10-29 | End: 2024-11-01 | Stop reason: HOSPADM

## 2024-10-29 RX ORDER — IOPAMIDOL 755 MG/ML
75 INJECTION, SOLUTION INTRAVASCULAR
Status: COMPLETED | OUTPATIENT
Start: 2024-10-29 | End: 2024-10-29

## 2024-10-29 RX ADMIN — BACITRACIN ZINC: 500 OINTMENT TOPICAL at 02:52

## 2024-10-29 RX ADMIN — BACITRACIN ZINC: 500 OINTMENT TOPICAL at 10:36

## 2024-10-29 RX ADMIN — IOPAMIDOL 75 ML: 755 INJECTION, SOLUTION INTRAVENOUS at 08:34

## 2024-10-29 RX ADMIN — ROSUVASTATIN 10 MG: 20 TABLET, FILM COATED ORAL at 20:34

## 2024-10-29 RX ADMIN — SODIUM CHLORIDE, PRESERVATIVE FREE 10 ML: 5 INJECTION INTRAVENOUS at 10:38

## 2024-10-29 RX ADMIN — ALLOPURINOL 100 MG: 100 TABLET ORAL at 10:37

## 2024-10-29 RX ADMIN — METHOCARBAMOL TABLETS 750 MG: 750 TABLET, COATED ORAL at 10:36

## 2024-10-29 RX ADMIN — METHOCARBAMOL TABLETS 750 MG: 750 TABLET, COATED ORAL at 20:34

## 2024-10-29 RX ADMIN — BACITRACIN ZINC: 500 OINTMENT TOPICAL at 20:35

## 2024-10-29 RX ADMIN — SODIUM CHLORIDE, PRESERVATIVE FREE 10 ML: 5 INJECTION INTRAVENOUS at 20:35

## 2024-10-29 ASSESSMENT — ENCOUNTER SYMPTOMS
SHORTNESS OF BREATH: 0
ABDOMINAL PAIN: 0
NAUSEA: 0
BACK PAIN: 0
CONSTIPATION: 0
BLOOD IN STOOL: 0
VOMITING: 0
DIARRHEA: 0
COUGH: 0

## 2024-10-29 NOTE — PROGRESS NOTES
No  Elimination:  Bowel:     []  continent    [x]  constipation    []  Incontinent              Bladder:  [x]  continent    []  constipation    []  Incontinent  ADLs:    Feeding:       [x]  Independent   []  Assisted   []  Dependent                 Bathing:       [x]  Independent   []  Assisted   []  Dependent      Dressing:     [x]  Independent   []  Assisted   []  Dependent        Toileting:      [x]  Independent   []  Assisted   []  Dependent  IADLs:   Cooking:       []  Independent   []  Assisted   [x]  Dependent      Cleaning:      []  Independent   []  Assisted   [x]  Dependent       Shopping:    []  Independent   []  Assisted   [x]  Dependent                 Medication:  []  Independent   []  Assisted   [x]  Dependent       Driving:         []  Independent   []  Assisted   [x]  Dependent      Banking:       []  Independent   []  Assisted   [x]  Dependent    Physical therapy consult placed:  Yes Occupational therapy consult placed:  Yes  Sleep:   []  Disrupted     []  Daytime drowsiness    []  Good  Socially engaged:   []  Frequently    []  Occasionally   []  Not  Social:   Marital Status:   []      []      []      []  Single                 Lives with:      []  Along    []  With spouse    []  Other      Home:         []  House    []  Apartment    []  Supported living (assisted living)   []  SNF    []  Other  Caregiver:  Has a caregiver?  Yes (if yes, answer the following questions)    Caregiver relationship:   [x]  Spouse    []  Sibling    []  Offspring    []  Other    Advanced Directives in place:  Yes    Code Status:  Full      15 VARIABLE TRAUMA SPECIFIC FRAILTY INDEX    Comorbidities  Cancer History:                  []  YES (1)    [x]  NO (0)  Coronary Heart Disease:   []  MI (1)    []  CABG (0.75)   []  PCI (0.5)    []  Meds (0.25)    [x]  None (0)  Dementia    []  Severe (1)    []  Moderate (0.5)    []  Mild (0.25)    [x]  No (0)    Daily Activities  Help with grooming:     []   YES (1)    [x]  NO (0)  Help with managing money:   [x]  YES (1)    []  NO (0)  Help doing housework:   [x]  YES (1)    []  NO (0)  Help toileting:    []  YES (1)    [x]  NO (0)  Help walking:   []  Wheelchair (1)     [x]  Walker (0.75)    []  Cane (0.5)    []  No (0)    Health Attitude  Feel less useful:     []  Most of the time (1)    [x]  Sometimes  (0.5)    []  Never (0)  Feel sad:    []  Most of the time (1)    [x]  Sometimes  (0.5)    []  Never (0)  Feel effort to do everything:   []  Most of the time (1)    [x]  Sometimes  (0.5)    []  Never (0)  Feel lonely:     []  Most of the time (1)    []  Sometimes  (0.5)    [x]  Never (0)  Falls:   []  Within last month (1)    [x]  Present not in last month (0.5)    []  None (0)    Function  Sexually active:  []  YES (0)    [x]  NO (1)    Nutrition  Albumin:    []  <3g/dL (1)     []  >3g/dL (0)    SCORE:      5.25/15   (if score >0.25, consider frail)      ELDER ABUSE SUSPICION INDEX (EASI)  EASI questions.  Q1-5 answered by patient, Q6 answered by physician    In the last 12 months:  Have you relied on people for any of the following:  bathing, dressing, shopping, banking, or meals?  Yes  Has anyone prevented you from getting food, clothes, medication, glasses, hearing aids or medical care, or from being with people you wanted to be with?  No  Have you been upset because someone talked to you in a way that made you feel shamed or threatened?  No  Has anyone tried to force you to sign papers or to use your money against your will?  No  Has anyone made you afraid, touched you in ways that you did not want, or hurt you physically?  No  Doctor:  elder abuse may be associated with findings such as:  poor eye contact, withdrawn nature, malnourishment, hygiene issues, cuts, bruises, inappropriate, clothing, or medication compliance issues.  Did you notice any of these today?  No

## 2024-10-29 NOTE — H&P
TRAUMA HISTORY & PHYSICAL  Attending/Surgical Resident/Advance Practice Nurse  10/29/2024  2:32 AM    PRIMARY SURVEY    CHIEF COMPLAINT:  Trauma consult.    Injury occurred just prior to arrival. Transfer from [facility]. Patient had mechanical fall at home. Wife states that he got up from the chair and then while walking his legs started shaking and then he fell backward. Hit his head, no loss of consciousness. Found to have C4 fracture and was transferred to [facility] for neurosurgical evaluation. CT head and chest negative for acute injury. GCS 14. No complaints.     AIRWAY:   Airway Normal    EMS ETT Absent  Noisy respirations Absent  Retractions: Absent  Vomiting/bleeding: Absent    BREATHING:    Midaxillary breath sound left:  Normal  Midaxillary breath sound right:  Normal    Cough sound intensity:  good    FiO2:  Room air    SMI deferred    CIRCULATION:   Femerol pulse intensity: Strong  Palpebral conjunctiva: Red    Vitals:    10/29/24 0200   BP: (!) 138/57   Pulse: 54   Resp: 18   Temp:    SpO2: 99%       Vitals:    10/28/24 2347 10/29/24 0000 10/29/24 0130 10/29/24 0200   BP: (!) 158/90 (!) 142/62 (!) 146/58 (!) 138/57   Pulse: 58 58 53 54   Resp: 16 16 17 18   Temp: 97.5 °F (36.4 °C)      TempSrc: Oral      SpO2: 98% 98% 97% 99%   Weight: 65.8 kg (145 lb)      Height: 1.778 m (5' 10\")           FAST EXAM: Deferred    Central Nervous System    GCS Initial 15 minutes   Eye  Motor  Verbal 4 - Opens eyes on own  6 - Follows simple motor commands  4 - Seems confused, disoriented 4 - Opens eyes on own  6 - Follows simple motor commands  4 - Seems confused, disoriented     Neuromuscular blockade: No  Pupil size:  Left 4 mm    Right 4 mm  Pupil reaction: Yes    Wiggles fingers: Left Yes Right Yes  Wiggles toes: Left Yes   Right Yes    Hand grasp:   Left  Present      Right  Present  Plantar flexion: Left  Present      Right   Present    Loss of consciousness:  No     History Obtained From:  Patient & EMS  [facility]

## 2024-10-29 NOTE — PLAN OF CARE
Problem: Safety - Adult  Goal: Free from fall injury  Outcome: Progressing  Flowsheets (Taken 10/29/2024 1721)  Free From Fall Injury:   Instruct family/caregiver on patient safety   Based on caregiver fall risk screen, instruct family/caregiver to ask for assistance with transferring infant if caregiver noted to have fall risk factors     Problem: Discharge Planning  Goal: Discharge to home or other facility with appropriate resources  Outcome: Progressing     Problem: Skin/Tissue Integrity  Goal: Absence of new skin breakdown  Description: 1.  Monitor for areas of redness and/or skin breakdown  2.  Assess vascular access sites hourly  3.  Every 4-6 hours minimum:  Change oxygen saturation probe site  4.  Every 4-6 hours:  If on nasal continuous positive airway pressure, respiratory therapy assess nares and determine need for appliance change or resting period.  Outcome: Progressing     Problem: ABCDS Injury Assessment  Goal: Absence of physical injury  Outcome: Progressing

## 2024-10-29 NOTE — ED NOTES
Patient placed on bed pan per patient's requested. Patient unable to void at this time. Patient cleaned and positioned comfortably in bed.

## 2024-10-29 NOTE — ED NOTES
Nurse to nurse given to RN taking over patient care in room 5214-A. Dialysis RN made aware patient to got to 5214-A after dialysis treatment.

## 2024-10-29 NOTE — PROGRESS NOTES
GERIATRIC TRAUMA RESIDENT INITIAL ASSESSMENT    Chief Complaint   Patient presents with    Fall     Trauma consult, transfer from [facility]. Fall at home. C4 fx, left rib fxs 3 and 7       Mechanism of Injury:  mechanical fall   Loss of consciousness:  No    HPI:  Patient is transferred from [facility] to [facility] for neurosurgical evaluation. Patient states that he got up from the chair and then while walking his legs started shaking and he fell backward. Patient hit his head, but no loss of consciousness. At [facility] found to have C4 fracture. Patient's chest and CT head showed no concern for acute injury. On evaluation today, patient states having neck pain, placed on [place] collar.     Past Medical History:   Diagnosis Date    Aneurysm of abdominal aorta (HCC) 9/27/2012    Deep vein thrombosis of calf ([facility])     R calf vein thrombosis involving post tibial vein with extension in close proximity  to popliteal vein    Gout     History of DVT (deep vein thrombosis) 3/12/2015    History of tobacco use 7/27/2017    Hypertension     Iliac artery aneurysm, bilateral (HCC) 6/29/2017    Loss of weight     diet and exercise    Right leg DVT ([facility]) 3/12/2015       Past Surgical History:   Procedure Laterality Date    ABDOMINAL AORTIC ANEURYSM REPAIR      2018    DIALYSIS FISTULA CREATION Left 11/17/2023    creation AVF left arm performed by Mariana Gutierrez MD at [facility] [place]    EP DEVICE PROCEDURE N/A 01/26/2024    Medtronic LINQ insert performed by Johnson Gonsalves MD at [facility]ARDI[facility] C[facility] LAB    HERNIA REPAIR      OPEN REPAIR PERIPHERAL ANEURYSM Bilateral 2018    TONSILLECTOMY      VASCULAR SURGERY Left 07/14/2023    insertion AVF left arm performed by Mariana Gutierrez MD at [facility]       History reviewed. No pertinent family history.    Allergies   Allergen Reactions    Penicillins     Seasonal        Social History     Tobacco Use    Smoking status: Former     Current packs/day: 0.00     Types: Cigarettes     Quit date:  (PROAMATINE) 2.5 MG tablet Take 1 tablet by mouth in the morning and at bedtime Takes 5 mg twice daily on dialysis days. 120 tablet 5    rosuvastatin (CRESTOR) 10 MG tablet TAKE ONE TABLET BY MOUTH EVERY EVENING      Apoaequorin (PREVAGEN PO) Take 1 capsule by mouth      apixaban (ELIQUIS) 2.5 MG TABS tablet Take 1 tablet by mouth 2 times daily      Probiotic Product (PROBIOTIC BLEND PO) Take by mouth      allopurinol (ZYLOPRIM) 100 MG tablet Take 1 tablet by mouth daily           Review of Systems   Constitutional:  Negative for chills and fever.   HENT:  Negative for congestion.    Respiratory:  Negative for cough and shortness of breath.    Cardiovascular:  Negative for chest pain, palpitations and leg swelling.   Gastrointestinal:  Negative for abdominal pain, blood in stool, constipation, diarrhea, nausea and vomiting.   Musculoskeletal:  Positive for neck pain. Negative for back pain and myalgias.   Skin:  Negative for rash.   Neurological:  Negative for dizziness and headaches.   Psychiatric/Behavioral:  The patient is not nervous/anxious.          BP (!) 142/85   Pulse 54   Temp 97.5 °F (36.4 °C) (Oral)   Resp 15   Ht 1.778 m (5' 10\")   Wt 65.8 kg (145 lb)   SpO2 97%   BMI 20.81 kg/m²   Physical Exam  Constitutional:       General: He is not in acute distress.     Appearance: He is well-developed.   HENT:      Head: Normocephalic and atraumatic.   Eyes:      General: No scleral icterus.     Conjunctiva/sclera: Conjunctivae normal.   Neck:      Trachea: No tracheal deviation.      Comments: [place] Collar  Cardiovascular:      Rate and Rhythm: Normal rate.      Heart sounds: No murmur heard.  Pulmonary:      Effort: Pulmonary effort is normal. No respiratory distress.      Breath sounds: Normal breath sounds.   Abdominal:      General: Bowel sounds are normal. There is no distension.      Palpations: Abdomen is soft.      Tenderness: There is no abdominal tenderness.   Musculoskeletal:         General:

## 2024-10-29 NOTE — CONSULTS
[facility]              [address] [place] [address]                              CONSULTATION      PATIENT NAME: CASSIUS DINH               : 1945  MED REC NO: [id number]                        ROOM: 21  ACCOUNT NO: [id number]                       ADMIT DATE: 10/28/2024  PROVIDER: Isatu Chin MD    NEUROSURGERY CONSULT    CONSULT DATE: 10/29/2024      REASON FOR CONSULT:  C4 vertebral body fracture.    HISTORY OF PRESENT ILLNESS:  The patient is an 79-year-old gentleman who apparently got up to the edge of the chair, tried to get up, to ambulate.  His legs gave out, and he fell, and he was taken to an outside hospital where CT scan of his cervical spine showed a C4 fracture.  He was ultimately transported to [facility] in [place] for further evaluation and management.  He complains of neck pain, but denies any new numbness, tingling, or weakness, or loss of control of bowel or bladder function.  He does have some cognitive decline with memory problems.    PAST MEDICAL HISTORY:  Positive for abdominal aortic aneurysm, gout, DVT, hypertension, end-stage renal disease, dialysis dependent.    PAST SURGICAL HISTORY:  Positive for repair of abdominal aortic aneurysm, dialysis fistula creation, hernia repair, tonsillectomy, vascular surgery, and placement of a Medtronic LINQ device.    HOME MEDICATIONS:  Includes Eliquis.    FAMILY HISTORY:  Noncontributory.    SOCIAL HISTORY:  He is an ex-smoker.    ALLERGIES:  INCLUDES PENICILLIN.      REVIEW OF SYSTEMS:  HEENT:  Negative for headache, but positive for neck pain.  CARDIOVASCULAR:  Negative for chest pain, arrhythmia, or palpitations.  RESPIRATORY:  Negative for shortness of breath, asthma, bronchitis, or pneumonia.  GASTROINTESTINAL:  Negative for heartburn, nausea, vomiting, diarrhea, or constipation.  GENITOURINARY:  Negative for hematuria or dysuria.  HEMATOLOGIC:  Positive for easy

## 2024-10-29 NOTE — DISCHARGE INSTRUCTIONS
TRAUMA SERVICES DISCHARGE INSTRUCTIONS    Call [phone], option 2, for any questions/concerns and for follow-up appointment in 1 week(s).    Please follow the instructions checked below:  During your stay, we identified an incidental finding of: Right internal carotid artery near occlusive stenosis  Please follow-up with your primary care provider.    ACTIVITY INSTRUCTIONS  Increase activity as tolerated  No heavy lifting or strenuous activity  Take your incentive spirometer home and use 4-6 times/day   [x]  No driving until cleared by neurosurgery    WOUND/DRESSING INSTRUCTIONS:  You may shower.  No sitting in bath tub, hot tub or swimming until cleared by physician.  Ice to areas of pain for first 24 hours.  Heat to areas of pain after that.  Wash areas of lacerations/abrasions with soap & water.  Rinse well.  Pat dry with clean towel.  Apply thin layer of Bacitracin, Neosporin, or triple antibiotic cream to affected area 2-3 times per day.  Keep wounds clean and dry.    MEDICATION INSTRUCTIONS  Take medication as prescribed.  When taking pain medications, you may experience dizziness or drowsiness.  Do not drink alcohol or drive when taking these medications.  You may experience constipation while taking pain medication.  You may take over the counter stool softeners such as docusate (Colace), sennosides S (Senokot-S), or Miralax.   []  You may take Ibuprofen (over the counter) as directed for mild pain.     --You may take up to 800mg every 8 hours for pain, please take with food or milk.   [x]  You may take acetaminophen (Tylenol) products.  Do NOT take more than 4000mg of Tylenol in 24h.   [x]  Do not take any other acetaminophen (Tylenol) products if you are taking Percocet or Norco, as these contain Tylenol.   --Do NOT take more than 4000mg of Tylenol in 24h.    OPIOID MEDICATION INSTRUCTIONS  Read the medication guide that is included with your prescription.  Take your medication exactly as

## 2024-10-29 NOTE — PROGRESS NOTES
4 Eyes Skin Assessment     NAME:  Cleveland Thompson  YOB: 1945  MEDICAL RECORD NUMBER:  [id number]    The patient is being assessed for  Admission    I agree that at least one RN has performed a thorough Head to Toe Skin Assessment on the patient. ALL assessment sites listed below have been assessed.      Areas assessed by both nurses:    Head, Face, Ears, Shoulders, Back, Chest, Arms, Elbows, Hands, Sacrum. Buttock, Coccyx, Ischium, and Legs. Feet and Heels  BUE:  ecchymosis  LUE: AV fistula        Does the Patient have a Wound? No noted wound(s)       Janak Prevention initiated by RN:yes  Wound Care Orders initiated by RN: No    Pressure Injury (Stage 3,4, Unstageable, DTI, NWPT, and Complex wounds) if present, place Wound referral order by RN under : No    New Ostomies, if present place, Ostomy referral order under : No     Nurse 1 eSignature: Electronically signed by Aby Meyer RN on 10/29/24 at 7:44 PM EDT    **SHARE this note so that the co-signing nurse can place an eSignature**    Nurse 2 eSignature: {Esignature:[id number]}

## 2024-10-29 NOTE — ED NOTES
Patient states he does not want to take the pain medication ordered at this time. Patient aware he may have it prior to transfer to [facility].

## 2024-10-29 NOTE — CONSULTS
Recent Labs     10/28/24  1641 10/29/24  0526    136   K 4.2 4.8   CL 96* 96*   CO2 29 28   CREATININE 7.2* 7.9*   BUN 37* 40*   LABGLOM 7* 6*   GLUCOSE 130* 101*   CALCIUM 9.4 8.8       Vit D, 25-Hydroxy   Date Value Ref Range Status   02/21/2022 60 30 - 100 ng/mL Final     Comment:     <20 ng/mL.............Deficient  20-30 ng/mL...........Insufficient   ng/mL..........Sufficient  >100 ng/mL............Toxic         No results found for: \"PTH\"    No results for input(s): \"ALT\", \"AST\", \"ALKPHOS\", \"BILITOT\", \"BILIDIR\" in the last 72 hours.    No results for input(s): \"LABALBU\" in the last 72 hours.    Iron   Date Value Ref Range Status   02/21/2022 59 59 - 158 mcg/dL Final     TIBC   Date Value Ref Range Status   02/21/2022 207 (L) 250 - 450 mcg/dL Final       No results found for: \"GUOVJRMF54\"    No results found for: \"FOLATE\"    Lab Results   Component Value Date/Time    COLORU Yellow 03/13/2020 06:10 AM    NITRU Negative 03/13/2020 06:10 AM    GLUCOSEU Negative 03/13/2020 06:10 AM    GLUCOSEU NEGATIVE 08/24/2011 08:00 AM    KETUA Negative 03/13/2020 06:10 AM    UROBILINOGEN 0.2 03/13/2020 06:10 AM    BILIRUBINUR Negative 03/13/2020 06:10 AM    BILIRUBINUR NEGATIVE 08/24/2011 08:00 AM       Lab Results   Component Value Date/Time    PROTEIN 6.7 05/27/2023 06:56 PM       No components found for: \"URIC\"    No results found for: \"LIPIDPAN\"    Assessment and Plans:    ESRD on HD  outpatient at [facility] TTS via left arm AV fistula  Monitor labs  Continue HD TTS while inpatient-HD today     2.  Fall/cervical fracture  Left rib XR 10/28-subtle cortical irregularity seen involving lateral L 3rd and 7th ribs which may indicate recent fx  CT head 10/28 - no acute intracranial abnormality  CT cervical spine 10/28 - acute nondisplaced fracture seen involving anterior inferior corner of C4 vertebral body  CT chest 10/28 - no acute traumatic findings, chronic liver disease  Neurosurgery and trauma  surgery following    3.  Secondary hyperparathyroidism of renal origin   and phosphorus 4.7 on 10/17 in the outpatient setting  Check phosphorus in the a.m.  Monitor labs    4.  Intermittent hypotension  On midodrine  Monitor BPs    ABRIL Temple - CNP    Patient seen and examined all key components of the physical performed independently , case discussed with NP, all pertinent labs and radiologic tests personally reviewed agree with above.      Wilder De León MD

## 2024-10-29 NOTE — ED NOTES
Wife of pt called RN to bedside due to pt taking off his cervical collar. U/A to bedside pt is found sitting upright holding his cervical collar in his hands. Collar was properly placed back on. Pt and spouse were educated on importance of cervical spine immobilization device. PT redirectable at this time. Wife was informed to call for help or press call light if pt is attempting to remove collar again. PT is TORRES and has no complaints at this time.

## 2024-10-29 NOTE — PROGRESS NOTES
Radiology Procedure Waiver   Name: Cleveland Thompson  : 1945  MRN: [id number]    Date:  10/29/24    Time: 8:02 AM EDT    Benefits of immediately proceeding with Radiology exam(s) outweigh the risks or are not indicated as specified below and therefore the following is/are being waived:    [] Pregnancy test   [] Patients LMP on-time and regular.   [] Patient had Tubal Ligation or has other Contraception Device.   [] Patient  is Menopausal or Premenarcheal.    [] Patient had Full or Partial Hysterectomy.    [] Protocol for Iodine allergy    [] MRI Questionnaire     [] BUN/Creatinine   [] Patient age w/no hx of renal dysfunction.   [x] Patient on Dialysis.   [] Recent Normal Labs.  Electronically signed by Emma Yee MD on 10/29/24 at 8:02 AM EDT

## 2024-10-29 NOTE — ED PROVIDER NOTES
contrast. Mediastinum: ASVD.  Coronary disease.  Otherwise unremarkable. Lungs/pleura: Lungs negative for suspicious nodule, mass, pneumothorax, or consolidation.  Occasional calcified granuloma.  Scattered subsegmental atelectasis.  Mild diffuse bronchitis. Upper Abdomen: Chronic liver disease.  Colonic fecal retention.  Atrophic kidneys. Soft Tissues/Bones: No acute osseous findings or destructive bone lesions. Gynecomastia.  Right hemidiaphragm eventration.  Occasional chronic left rib fracture.     1. No acute traumatic findings. 2. Chronic liver disease. 3. Colonic fecal retention. 4. Atrophic kidneys. 5. Gynecomastia. 6. Right hemidiaphragm eventration. 7. Occasional chronic left rib fracture.     CT HEAD WO CONTRAST    Result Date: 10/28/2024  EXAMINATION: CT OF THE HEAD WITHOUT CONTRAST  10/28/2024 4:04 pm TECHNIQUE: CT of the head was performed without the administration of intravenous contrast. Automated exposure control, iterative reconstruction, and/or weight based adjustment of the mA/kV was utilized to reduce the radiation dose to as low as reasonably achievable. COMPARISON: June 23, 2024 HISTORY: ORDERING SYSTEM PROVIDED HISTORY: fall TECHNOLOGIST PROVIDED HISTORY: Has a \"code stroke\" or \"stroke alert\" been called?->No Reason for exam:->fall FINDINGS: Stable focal area of encephalomalacia associated with caudate nucleus on the right suggestive of chronic stroke.  No acute intracranial hemorrhage or edema.  No abnormal extra-axial fluid collections.  There is prominence of the CSF spaces related to generalized parenchymal volume loss.  No evidence of skull fracture.  Mastoid air cells are clear.     No acute intracranial abnormality.     CT CERVICAL SPINE WO CONTRAST    Result Date: 10/28/2024  EXAMINATION: CT OF THE CERVICAL SPINE WITHOUT CONTRAST 10/28/2024 4:04 pm TECHNIQUE: CT of the cervical spine was performed without the administration of intravenous contrast. Multiplanar reformatted images are  provided for review. Automated exposure control, iterative reconstruction, and/or weight based adjustment of the mA/kV was utilized to reduce the radiation dose to as low as reasonably achievable. COMPARISON: June 23, 2024 HISTORY: ORDERING SYSTEM PROVIDED HISTORY: fall TECHNOLOGIST PROVIDED HISTORY: Reason for exam:->fall FINDINGS: There is a new nondisplaced fracture involving anterior inferior corner of C4 vertebral body.  There is normal alignment of the cervical spine. Degenerative posterior disc/osteophyte complex are present at multiple levels notable at C3/C4, C4/C5, and C5/C6.  Facet arthropathy is present on the left at C2/C3.     Acute nondisplaced fracture is seen involving anterior inferior corner of C4 vertebral body.     XR RIBS LEFT INCLUDE CHEST (MIN 3 VIEWS)    Result Date: 10/28/2024  EXAMINATION: XRAY VIEWS OF THE LEFT RIBS WITH FRONTAL XRAY VIEW OF THE CHEST 10/28/2024 4:02 pm COMPARISON: Correlation made with CT chest from 06/23/2024 HISTORY: ORDERING SYSTEM PROVIDED HISTORY: fall, anterior rib pain TECHNOLOGIST PROVIDED HISTORY: Reason for exam:->fall, anterior rib pain FINDINGS: There is a subtle cortical irregularity involving lateral left 3rd rib. Cortical irregularity is also seen involving lateral left 4th rib.  Subtle cortical irregularity is also seen involving lateral left 7th rib.  No pneumothorax.  The heart is normal in size.  No airspace opacity or pleural effusion.  No pneumothorax.  Implantable cardiac monitor is present.     1. Subtle cortical irregularities are seen involving lateral left 3rd and 7th ribs which may indicate recent fractures. 2. There is a chronic fracture involving lateral left 4th rib. 3. No airspace opacity or pleural effusion.       No results found.    PROCEDURES   Unless otherwise noted below, none          CRITICAL CARE TIME (.cct)       PAST MEDICAL HISTORY/Chronic Conditions Affecting Care      has a past medical history of Aneurysm of abdominal aorta

## 2024-10-29 NOTE — FLOWSHEET NOTE
10/29/24 1615   Vital Signs   BP (!) 131/58   Temp (!) 96.6 °F (35.9 °C)   Pulse 51   Respirations 18   Post-Hemodialysis Assessment   Post-Treatment Procedures Blood returned;Access bleeding time < 10 minutes   Machine Disinfection Process Acid/Vinegar Clean;Heat Disinfect   Rinseback Volume (ml) 300 ml   Blood Volume Processed (Liters) 66.2 L   Dialyzer Clearance Lightly streaked   Duration of Treatment (minutes) 180 minutes   Heparin Amount Administered During Treatment (mL) 0 mL   Hemodialysis Intake (ml) 300 ml   Hemodialysis Output (ml) 800 ml   NET Removed (ml) 500   Patient Response to Treatment tolerated tx well once settled down.   Bilateral Breath Sounds Diminished   Patient Disposition Return to room   Observations & Evaluations   Level of Consciousness 0   Oriented X 1   Heart Rhythm Regular   Respiratory Quality/Effort Unlabored

## 2024-10-29 NOTE — H&P
TRAUMA SURGERY HISTORY & PHYSICAL  TRAUMA ATTENDING      Attending Physician Statement:  I have examined the patient in ED , reviewed the record, and discussed the case with the resident/APN.  I agree with the  assessment and plan with the following corrections/additions.      CC: fall    HISTORY   The patient is a 78 y/o male who sustained a GLF yesterday.  He gets the shakes from time to time and his legs will give out.  The patient reported  acute, constant  sharp pain localized to the c-spine that started immediately. The intensity of the pain is 2/10.  Pain does not radiate. There are no alleviating or worsening factors regarding the pain.  The patient was transported by EMS to the [facility] from [facility].   Evaluation prior to arrival included: CT head, c-spine.  Treatment prior to arrival included: c-collar.  A trauma consult was requested to assist, guide,  and expedite further evaluation and treatment for the patient.      Past medical/surgical/family/social history: as pulled from the chart.  Past Medical History:   Diagnosis Date    Aneurysm of abdominal aorta (HCC) 9/27/2012    Deep vein thrombosis of calf (HCC)     R calf vein thrombosis involving post tibial vein with extension in close proximity  to popliteal vein    Gout     History of DVT (deep vein thrombosis) 3/12/2015    History of tobacco use 7/27/2017    Hypertension     Iliac artery aneurysm, bilateral (HCC) 6/29/2017    Loss of weight     diet and exercise    Right leg DVT ([facility]) 3/12/2015     Past Surgical History:   Procedure Laterality Date    ABDOMINAL AORTIC ANEURYSM REPAIR      2018    DIALYSIS FISTULA CREATION Left 11/17/2023    creation AVF left arm performed by Mariana Gutierrez MD at [facility] [place]    EP DEVICE PROCEDURE N/A 01/26/2024    Medtronic LINQ insert performed by Johnson Gonsalves MD at [facility] CARDIAC CATH LAB    HERNIA REPAIR      OPEN REPAIR PERIPHERAL ANEURYSM Bilateral 2018     injection 10 mL  10 mL IntraVENous Once PRN Rufus Anton MD         Current Outpatient Medications   Medication Sig Dispense Refill    midodrine (PROAMATINE) 2.5 MG tablet Take 1 tablet by mouth in the morning and at bedtime Takes 5 mg twice daily on dialysis days. 120 tablet 5    rosuvastatin (CRESTOR) 10 MG tablet TAKE ONE TABLET BY MOUTH EVERY EVENING      Apoaequorin (PREVAGEN PO) Take 1 capsule by mouth      apixaban (ELIQUIS) 2.5 MG TABS tablet Take 1 tablet by mouth 2 times daily      Probiotic Product (PROBIOTIC BLEND PO) Take by mouth      allopurinol (ZYLOPRIM) 100 MG tablet Take 1 tablet by mouth daily         Additional history as asked from the patient:  PMH: DVT, ESRD, dementia  PSH:  AAA repair, hernia repair  FAMHX: no issues with general anesthesia  SocHx: no tob, occ alcohol, no IVDU  Allergy: PCN  Medications: eliquis, cresor, prevagen, midodrine, allopurinol    Review of Systems:  General ROS: negative  Psychological ROS: negative  ENT ROS: negative  Hematological and Lymphatic ROS: negative  Respiratory ROS: negative  Cardiovascular ROS: negative  Gastrointestinal ROS: negative  Genito-Urinary ROS: negative  Musculoskeletal ROS: negative  Neurological ROS: negative     PHYSICAL EXAM:  Vitals:    10/29/24 0807   BP: 131/61   Pulse: 70   Resp: 20   Temp:    SpO2: 96%       Neuro:   GCS 15    Moving all extremities   Reactive, equal pupils  Psychiatric:  Affect normal, Judgement normal   Alert and oriented to self, place, time  Head/Face:   no soft tissue injury  no bony deformities  Eyes:    Vision/EOM grossly intact  Ears:    no otorrhagia  Nose:     no epistaxis  Mouth:    no intraoral lacerations/ecchymoses  Neck:   semi rigid collar present  no tracheal deviation   no soft tissue injury  Chest:    no deformities  non tender  Lungs:     equal and clear bilateral breath sounds  no use of accessory muscles  Heart:    normal sinus rhythm  warm, well perfused  strong femoral pulses

## 2024-10-30 LAB
ANION GAP SERPL CALCULATED.3IONS-SCNC: 12 MMOL/L (ref 7–16)
BASOPHILS # BLD: 0.04 K/UL (ref 0–0.2)
BASOPHILS NFR BLD: 1 % (ref 0–2)
BUN SERPL-MCNC: 22 MG/DL (ref 6–23)
CALCIUM SERPL-MCNC: 8.5 MG/DL (ref 8.6–10.2)
CHLORIDE SERPL-SCNC: 95 MMOL/L (ref 98–107)
CO2 SERPL-SCNC: 27 MMOL/L (ref 22–29)
CREAT SERPL-MCNC: 5.2 MG/DL (ref 0.7–1.2)
EOSINOPHIL # BLD: 0.15 K/UL (ref 0.05–0.5)
EOSINOPHILS RELATIVE PERCENT: 2 % (ref 0–6)
ERYTHROCYTE [DISTWIDTH] IN BLOOD BY AUTOMATED COUNT: 15.3 % (ref 11.5–15)
GFR, ESTIMATED: 11 ML/MIN/1.73M2
GLUCOSE SERPL-MCNC: 83 MG/DL (ref 74–99)
HCT VFR BLD AUTO: 38.7 % (ref 37–54)
HGB BLD-MCNC: 11.8 G/DL (ref 12.5–16.5)
IMM GRANULOCYTES # BLD AUTO: 0.06 K/UL (ref 0–0.58)
IMM GRANULOCYTES NFR BLD: 1 % (ref 0–5)
LYMPHOCYTES NFR BLD: 1.51 K/UL (ref 1.5–4)
LYMPHOCYTES RELATIVE PERCENT: 18 % (ref 20–42)
MAGNESIUM SERPL-MCNC: 2.4 MG/DL (ref 1.6–2.6)
MCH RBC QN AUTO: 31.8 PG (ref 26–35)
MCHC RBC AUTO-ENTMCNC: 30.5 G/DL (ref 32–34.5)
MCV RBC AUTO: 104.3 FL (ref 80–99.9)
MONOCYTES NFR BLD: 0.93 K/UL (ref 0.1–0.95)
MONOCYTES NFR BLD: 11 % (ref 2–12)
NEUTROPHILS NFR BLD: 67 % (ref 43–80)
NEUTS SEG NFR BLD: 5.53 K/UL (ref 1.8–7.3)
PHOSPHATE SERPL-MCNC: 4.1 MG/DL (ref 2.5–4.5)
PLATELET # BLD AUTO: 145 K/UL (ref 130–450)
PMV BLD AUTO: 10.5 FL (ref 7–12)
POTASSIUM SERPL-SCNC: 4.4 MMOL/L (ref 3.5–5)
RBC # BLD AUTO: 3.71 M/UL (ref 3.8–5.8)
SODIUM SERPL-SCNC: 134 MMOL/L (ref 132–146)
WBC OTHER # BLD: 8.2 K/UL (ref 4.5–11.5)

## 2024-10-30 PROCEDURE — 97530 THERAPEUTIC ACTIVITIES: CPT

## 2024-10-30 PROCEDURE — 84100 ASSAY OF PHOSPHORUS: CPT

## 2024-10-30 PROCEDURE — 6370000000 HC RX 637 (ALT 250 FOR IP): Performed by: SURGERY

## 2024-10-30 PROCEDURE — 2700000000 HC OXYGEN THERAPY PER DAY

## 2024-10-30 PROCEDURE — 97161 PT EVAL LOW COMPLEX 20 MIN: CPT

## 2024-10-30 PROCEDURE — 6370000000 HC RX 637 (ALT 250 FOR IP)

## 2024-10-30 PROCEDURE — 1200000000 HC SEMI PRIVATE

## 2024-10-30 PROCEDURE — 80048 BASIC METABOLIC PNL TOTAL CA: CPT

## 2024-10-30 PROCEDURE — 2580000003 HC RX 258

## 2024-10-30 PROCEDURE — 97165 OT EVAL LOW COMPLEX 30 MIN: CPT

## 2024-10-30 PROCEDURE — 36415 COLL VENOUS BLD VENIPUNCTURE: CPT

## 2024-10-30 PROCEDURE — 85025 COMPLETE CBC W/AUTO DIFF WBC: CPT

## 2024-10-30 PROCEDURE — 97535 SELF CARE MNGMENT TRAINING: CPT

## 2024-10-30 PROCEDURE — 83735 ASSAY OF MAGNESIUM: CPT

## 2024-10-30 RX ORDER — SODIUM CHLORIDE, SODIUM LACTATE, POTASSIUM CHLORIDE, CALCIUM CHLORIDE 600; 310; 30; 20 MG/100ML; MG/100ML; MG/100ML; MG/100ML
INJECTION, SOLUTION INTRAVENOUS ONCE
Status: COMPLETED | OUTPATIENT
Start: 2024-10-30 | End: 2024-10-30

## 2024-10-30 RX ADMIN — BACITRACIN ZINC: 500 OINTMENT TOPICAL at 21:30

## 2024-10-30 RX ADMIN — SODIUM CHLORIDE, POTASSIUM CHLORIDE, SODIUM LACTATE AND CALCIUM CHLORIDE: 600; 310; 30; 20 INJECTION, SOLUTION INTRAVENOUS at 12:17

## 2024-10-30 RX ADMIN — SODIUM CHLORIDE, PRESERVATIVE FREE 10 ML: 5 INJECTION INTRAVENOUS at 21:29

## 2024-10-30 RX ADMIN — ALLOPURINOL 100 MG: 100 TABLET ORAL at 10:12

## 2024-10-30 RX ADMIN — SODIUM CHLORIDE, PRESERVATIVE FREE 10 ML: 5 INJECTION INTRAVENOUS at 10:13

## 2024-10-30 RX ADMIN — SODIUM CHLORIDE, POTASSIUM CHLORIDE, SODIUM LACTATE AND CALCIUM CHLORIDE: 600; 310; 30; 20 INJECTION, SOLUTION INTRAVENOUS at 11:58

## 2024-10-30 RX ADMIN — APIXABAN 2.5 MG: 5 TABLET, FILM COATED ORAL at 21:29

## 2024-10-30 RX ADMIN — METHOCARBAMOL TABLETS 750 MG: 750 TABLET, COATED ORAL at 21:29

## 2024-10-30 RX ADMIN — METHOCARBAMOL TABLETS 750 MG: 750 TABLET, COATED ORAL at 13:21

## 2024-10-30 RX ADMIN — BACITRACIN ZINC: 500 OINTMENT TOPICAL at 10:13

## 2024-10-30 RX ADMIN — APIXABAN 2.5 MG: 5 TABLET, FILM COATED ORAL at 10:13

## 2024-10-30 RX ADMIN — ROSUVASTATIN 10 MG: 20 TABLET, FILM COATED ORAL at 21:29

## 2024-10-30 RX ADMIN — BACITRACIN ZINC: 500 OINTMENT TOPICAL at 13:21

## 2024-10-30 RX ADMIN — METHOCARBAMOL TABLETS 750 MG: 750 TABLET, COATED ORAL at 10:12

## 2024-10-30 RX ADMIN — MIDODRINE HYDROCHLORIDE 2.5 MG: 2.5 TABLET ORAL at 11:37

## 2024-10-30 ASSESSMENT — ENCOUNTER SYMPTOMS
ABDOMINAL DISTENTION: 0
CONSTIPATION: 0
BLOOD IN STOOL: 0
ANAL BLEEDING: 0
BACK PAIN: 0
DIARRHEA: 0
ALLERGIC/IMMUNOLOGIC NEGATIVE: 1
ABDOMINAL PAIN: 0
EYES NEGATIVE: 1
NAUSEA: 0
GASTROINTESTINAL NEGATIVE: 1
RESPIRATORY NEGATIVE: 1
SHORTNESS OF BREATH: 0
COUGH: 0
VOMITING: 0

## 2024-10-30 ASSESSMENT — PAIN SCALES - WONG BAKER: WONGBAKER_NUMERICALRESPONSE: NO HURT

## 2024-10-30 ASSESSMENT — PAIN SCALES - GENERAL
PAINLEVEL_OUTOF10: 0
PAINLEVEL_OUTOF10: 0

## 2024-10-30 NOTE — PROGRESS NOTES
Physical Therapy Initial Assessment     Name: Cleveland Thompson  : 1945  MRN: [id number]      Date of Service: 10/30/2024    Evaluating PT:  Vel Gomez, PT [id number]    Room #:  5203/5203-A  Diagnosis:  Cervical compression fracture, initial encounter ([facility]) [S12.9XXA]  Fall, initial encounter [W19.XXXA]  Closed nondisplaced fracture of fourth cervical vertebra, unspecified fracture morphology, initial encounter ([facility]) [S12.301A]  PMHx/PSHx:   has a past medical history of Aneurysm of abdominal aorta ([facility]), Deep vein thrombosis of calf ([facility]), Gout, History of DVT (deep vein thrombosis), History of tobacco use, Hypertension, Iliac artery aneurysm, bilateral (HCC), Loss of weight, and Right leg DVT ([facility]).   has a past surgical history that includes hernia repair; Tonsillectomy; Abdominal aortic aneurysm repair; open thoracic aortic aneurysm repair (Bilateral, 2018); vascular surgery (Left, 2023); Dialysis fistula creation (Left, 2023); and ep device procedure (N/A, 2024).  Procedure/Surgery:  None  Precautions:  Falls, c-collar, spinal precautions, recent L lateral 3rd and 7th rib fxs, cognition, hx of dementia, TSM, alarms, monitor BP, HD  Equipment Owned:  FWW, transport WC  Equipment Needs:  TBD    SUBJECTIVE:    Home setup obtained from wife. Pt lives with wife in a 1 story home with 3 stair(s) to enter and 1 rail(s).  Bed is on first floor and bath is on first floor.  Pt ambulated with FWW and WC prior to admission.    OBJECTIVE:   Initial Evaluation  Date: 10/30/224 Treatment Short Term/ Long Term   Goals   AM-PAC 6 Clicks      Was pt agreeable to Eval/treatment? Yes     Does pt have pain? Neck pain     Bed Mobility  Rolling: Sami  Supine to sit: ModA  Sit to supine: NT  Scooting: MaxA  Rolling: Independent  Supine to sit: Independent  Sit to supine: Independent  Scooting: Independent   Transfers Sit to stand: Sami  Stand to sit: Sami  Stand pivot: Sami with FWW  Sit to stand: Mod

## 2024-10-30 NOTE — PLAN OF CARE
Problem: Safety - Adult  Goal: Free from fall injury  10/30/2024 0132 by Fer Jaramillo RN  Outcome: Progressing  10/29/2024 1902 by Aby Meyer RN  Outcome: Progressing  Flowsheets (Taken 10/29/2024 1721)  Free From Fall Injury:   Instruct family/caregiver on patient safety   Based on caregiver fall risk screen, instruct family/caregiver to ask for assistance with transferring infant if caregiver noted to have fall risk factors     Problem: Discharge Planning  Goal: Discharge to home or other facility with appropriate resources  10/30/2024 0132 by Fer Jaramillo RN  Outcome: Progressing  10/29/2024 1902 by Aby Meyer RN  Outcome: Progressing     Problem: Skin/Tissue Integrity  Goal: Absence of new skin breakdown  Description: 1.  Monitor for areas of redness and/or skin breakdown  2.  Assess vascular access sites hourly  3.  Every 4-6 hours minimum:  Change oxygen saturation probe site  4.  Every 4-6 hours:  If on nasal continuous positive airway pressure, respiratory therapy assess nares and determine need for appliance change or resting period.  10/30/2024 0132 by Fer Jaramillo RN  Outcome: Progressing  10/29/2024 1902 by Aby Meyer RN  Outcome: Progressing     Problem: ABCDS Injury Assessment  Goal: Absence of physical injury  10/30/2024 0132 by Fer Jaramillo RN  Outcome: Progressing  10/29/2024 1902 by Aby Meyer RN  Outcome: Progressing     Problem: Confusion  Goal: Confusion, delirium, dementia, or psychosis is improved or at baseline  Description: INTERVENTIONS:  1. Assess for possible contributors to thought disturbance, including medications, impaired vision or hearing, underlying metabolic abnormalities, dehydration, psychiatric diagnoses, and notify attending LIP  2. [place] high risk fall precautions, as indicated  3. Provide frequent short contacts to provide reality reorientation, refocusing and direction  4. Decrease environmental stimuli, including noise as

## 2024-10-30 NOTE — H&P
10/30/24 Update CM Note: patient is accepted at [facility] and precert to be started today. Pass/bonnie/destination completed. Ambulance form intiiated and will need completed at discharge. Envelope placed in soft chart. Electronically signed by Pili Barnes RN CM on 10/30/2024 at 1:54 PM     She likely developed some vulvar irritation due to recent stooling patterns.  I am happy there is no more blood and there is no persistent discharge.  She likely has a component of a mild yeast rash.  This is likely causing some mild discomfort.  For now, to help with irritation and possible yeast rash, proceed with medicated Butt paste ointment, apply liberally at least 4 times a day until better.  May take up to 2 weeks.  Warm soaks in the bathtub as needed  Avoid tight clothing  Avoid abrasive soaps, prefer scent free and gentle  Let us know if not improving or any other concerning symptoms.

## 2024-10-30 NOTE — CARE COORDINATION
10/30/24 Transition of care: Patient is admitted due to a fall at home. Found to have cervical fractures. Collar is on. He is confused/drowsy. His wife is at the bedside. She states they live in a one level home. He does his own ADLs and walks with a walker at home. He does have periods of confusion. She states he follows with Dr NOREEN Weber and fills his meds at [facility] in [place]. He is a dialysis patient and goes to [facility] t/th/sat. His wife takes him to dialysis. He has no history of JOHNY. He does have a history of homecare in the past. His wife is asking for a referral to [facility]. Therapies are not completed yet. He is being followed by neurosurgery and trauma. Will follow. Electronically signed by Pili Barnes RN  on 10/30/2024 at 10:08 AM      Case Management Assessment  Initial Evaluation    Date/Time of Evaluation: 10/30/2024 10:10 AM  Assessment Completed by: Pili Barnes RN    If patient is discharged prior to next notation, then this note serves as note for discharge by case management.    Patient Name: Cleveland Thompson                   YOB: 1945  Diagnosis: Cervical compression fracture, initial encounter ([facility]) [S12.9XXA]  Fall, initial encounter [W19.XXXA]  Closed nondisplaced fracture of fourth cervical vertebra, unspecified fracture morphology, initial encounter ([facility]) [S12.301A]                   Date / Time: 10/28/2024 11:49 PM    Patient Admission Status: Inpatient   Readmission Risk (Low < 19, Mod (19-27), High > 27): Readmission Risk Score: 15.5    Current PCP: Rafal Weber MD  PCP verified by ? Yes    Chart Reviewed: Yes      History Provided by: Spouse  Patient Orientation: Self, Sedated    Patient Cognition: Dementia / Early Alzheimer's    Hospitalization in the last 30 days (Readmission):  No    If yes, Readmission Assessment in  Navigator will be completed.    Advance Directives:      Code Status: Full Code   Patient's Primary Decision Maker is:

## 2024-10-30 NOTE — PROGRESS NOTES
Iliac artery aneurysm, bilateral ([facility]) 6/29/2017    Loss of weight     diet and exercise    Right leg DVT ([facility]) 3/12/2015       @[email or web address]@    Radiology:  CTA NECK W CONTRAST   Final Result   1. No convincing evidence of an acute traumatic injury of the cervical   carotid/vertebral arteries.   2. Atherosclerosis contributes to severe near occlusive stenosis involving   the proximal right cervical ICA.   3. Mild stenosis at the origin of the right vertebral artery.   4. Fractures involving the anterior/inferior corners of the C4 and C6   vertebral bodies.             PHYSICAL EXAM:     Central Nervous System  Loss of consciousness:  No    GCS:    Eye:  4 - Opens eyes on own  Motor:  6 - Follows simple motor commands  Verbal:  4 - Seems confused, disoriented    Neuromuscular blockade: No  Pupil size:  Left 3 mm    Right 3 mm  Pupil reaction: Yes    Wiggles fingers: Left Yes Right Yes  Wiggles toes: Left Yes   Right Yes    Hand grasp:   Left  Present      Right  Present  Plantar flexion: Left  Present      Right   Present    PHYSICAL EXAM  General: No apparent distress, comfortable  HEENT: Trachea midline, no masses, Pupils equal round. Custom collar in place.  Chest: Respiratory effort was normal with no retractions or use of accessory muscles.  Cardiovascular: Extremities warm, well perfused  Abdomen:  Soft and non distended.  No tenderness, guarding, rebound, or rigidity   Extremities: Moves all 4 extremeties, No pedal edema     Spine:   Spine Tenderness ROM   Cervical 0/10 Normal   Thoracic 0/10 Normal   Lumbar 0/10 Normal     Musculoskeletal:    Joint Tenderness Swelling ROM   Right shoulder Absent absent normal   Left shoulder absent absent normal   Right elbow absent absent normal   Left elbow absent absent normal   Right wrist absent absent normal   Left wrist absent absent normal   Right hand grasp Absent absent normal   Left hand grasp absent absent normal   Right hip absent absent normal   Left hip absent  absent normal   Right knee Absent absent normal   Left knee absent absent normal   Right ankle absent absent normal   Left ankle absent absent normal   Right foot Absent absent normal   Left foot absent absent normal       CONSULTS: Neurosurgery    PROCEDURES: none    INJURIES:      Principal Problem:    Cervical compression fracture, initial encounter ([facility])  Active Problems:    Closed nondisplaced fracture of fourth cervical vertebra (HCC)    Fall  Resolved Problems:    * No resolved hospital problems. *        Assessment/Plan:       Neuro:  C4 fracture - continue custom collar, follow up with NSGY in 4 weeks with xrays. Hx dementia - continue prevagen.   CV: Hx Afib - continue Eliquis. Hx hypotension - continue midodrine.  Pulm: tolerating room air    GI: Renal diet   Renal: Hx ESRD on HD - nephro following, continue HD TThS   ID: afebrile, no acute issues    Endocrine: no acute issues  MSK: Skin tears - bacitracin     Heme: no acute issues     Bowel regime: glycolax    Pain control/Sedation: tylenol, robaxin  DVT prophylaxis:  Eliquis   GI: diet   Glucose protocol: glucose <180  Mouth/Eye care: per patient   Ash: none     Code status:    Full Code    Patient/Family update:  As available     Disposition:  continue current care      Electronically signed by Keeley Rios MD on 10/30/24 at 7:54 AM EDT

## 2024-10-30 NOTE — PROGRESS NOTES
tobacco use 7/27/2017    Hypertension     Iliac artery aneurysm, bilateral (HCC) 6/29/2017    Loss of weight     diet and exercise    Right leg DVT ([facility]) 3/12/2015       Past Surgical History:   Procedure Laterality Date    ABDOMINAL AORTIC ANEURYSM REPAIR      2018    DIALYSIS FISTULA CREATION Left 11/17/2023    creation AVF left arm performed by Mariana Gutierrez MD at [facility] OR    EP DEVICE PROCEDURE N/A 01/26/2024    Medtr[employer] LINQ insert performed by Johnson Gonsalves MD at [facility] CARDIAC CATH LAB    HERNIA REPAIR      OPEN REPAIR PERIPHERAL ANEURYSM Bilateral 2018    TONSILLECTOMY      VASCULAR SURGERY Left 07/14/2023    insertion AVF left arm performed by Mariana Gutierrez MD at [facility] OR       Patient Active Problem List   Diagnosis    Aneurysm of abdominal aorta ([facility])    History of DVT (deep vein thrombosis)    Iliac artery aneurysm, bilateral (HCC)    History of tobacco use    Open leg wound, right, subsequent encounter    Anemia of chronic renal failure, stage 5 ([facility])    Encounter regarding vascular access for dialysis for ESRD ([facility])    End stage renal disease ([facility])    Dizziness    Sinus bradycardia    Cervical compression fracture, initial encounter ([facility])    Closed nondisplaced fracture of fourth cervical vertebra ([facility])    Fall       Diet:    ADULT DIET; Regular; 4 carb choices (60 gm/meal); Low Potassium (Less than 3000 mg/day)    Meds:     sodium chloride flush  10 mL IntraVENous 2 times per day    methocarbamol  750 mg Oral 4x Daily    rosuvastatin  10 mg Oral Nightly    midodrine  2.5 mg Oral BID    apixaban  2.5 mg Oral BID    allopurinol  100 mg Oral Daily    bacitracin zinc   Topical TID        sodium chloride         Meds prn:     sodium chloride flush, sodium chloride, ondansetron **OR** ondansetron, polyethylene glycol, acetaminophen    Meds prior to admission:     No current facility-administered medications on file prior to encounter.     Current Outpatient Medications on File  10/29/24  0526 10/30/24  0559    136 134   K 4.2 4.8 4.4   CL 96* 96* 95*   CO2 29 28 27   CREATININE 7.2* 7.9* 5.2*   BUN 37* 40* 22   LABGLOM 7* 6* 11*   GLUCOSE 130* 101* 83   CALCIUM 9.4 8.8 8.5*   PHOS  --   --  4.1   MG  --   --  2.4       Vit D, 25-Hydroxy   Date Value Ref Range Status   02/21/2022 60 30 - 100 ng/mL Final     Comment:     <20 ng/mL.............Deficient  20-30 ng/mL...........Insufficient   ng/mL..........Sufficient  >100 ng/mL............Toxic         No results found for: \"PTH\"    No results for input(s): \"ALT\", \"AST\", \"ALKPHOS\", \"BILITOT\", \"BILIDIR\" in the last 72 hours.    No results for input(s): \"LABALBU\" in the last 72 hours.    Iron   Date Value Ref Range Status   02/21/2022 59 59 - 158 mcg/dL Final     TIBC   Date Value Ref Range Status   02/21/2022 207 (L) 250 - 450 mcg/dL Final       No results found for: \"[id number]\"    No results found for: \"FOLATE\"    Lab Results   Component Value Date/Time    COLORU Yellow 03/13/2020 06:10 AM    NITRU Negative 03/13/2020 06:10 AM    GLUCOSEU Negative 03/13/2020 06:10 AM    GLUCOSEU NEGATIVE 08/24/2011 08:00 AM    KETUA Negative 03/13/2020 06:10 AM    UROBILINOGEN 0.2 03/13/2020 06:10 AM    BILIRUBINUR Negative 03/13/2020 06:10 AM    BILIRUBINUR NEGATIVE 08/24/2011 08:00 AM       Lab Results   Component Value Date/Time    PROTEIN 6.7 05/27/2023 06:56 PM       No components found for: \"URIC\"    No results found for: \"LIPIDPAN\"    Assessment and Plans:    ESRD on HD  outpatient at [facility] TTS via left arm AV fistula  Monitor labs  Continue HD TTS while inpatient-s/p HD 10/29 with 500 mL net removed-next HD 10/31    2.  Fall/cervical fracture  Left rib XR 10/28-subtle cortical irregularity seen involving lateral L 3rd and 7th ribs which may indicate recent fx  CT head 10/28 - no acute intracranial abnormality  CT cervical spine 10/28 - acute nondisplaced fracture seen involving anterior inferior corner of C4 vertebral body  CT

## 2024-10-30 NOTE — PLAN OF CARE
Problem: Safety - Adult  Goal: Free from fall injury  10/30/2024 1057 by Neva Gonzalez RN  Outcome: Progressing     Problem: Discharge Planning  Goal: Discharge to home or other facility with appropriate resources  10/30/2024 1057 by Neva Gonzalez RN  Outcome: Progressing     Problem: Skin/Tissue Integrity  Goal: Absence of new skin breakdown  Description: 1.  Monitor for areas of redness and/or skin breakdown  2.  Assess vascular access sites hourly  3.  Every 4-6 hours minimum:  Change oxygen saturation probe site  4.  Every 4-6 hours:  If on nasal continuous positive airway pressure, respiratory therapy assess nares and determine need for appliance change or resting period.  10/30/2024 1057 by Neva Gonzalez RN  Outcome: Progressing       Problem: Confusion  Goal: Confusion, delirium, dementia, or psychosis is improved or at baseline  Description: INTERVENTIONS:  1. Assess for possible contributors to thought disturbance, including medications, impaired vision or hearing, underlying metabolic abnormalities, dehydration, psychiatric diagnoses, and notify attending LIP  2. [place] high risk fall precautions, as indicated  3. Provide frequent short contacts to provide reality reorientation, refocusing and direction  4. Decrease environmental stimuli, including noise as appropriate  5. Monitor and intervene to maintain adequate nutrition, hydration, elimination, sleep and activity  6. If unable to ensure safety without constant attention obtain sitter and review sitter guidelines with assigned personnel  7. Initiate Psychosocial CNS and Spiritual Care consult, as indicated  10/30/2024 1057 by Neva Gonzalez RN  Outcome: Progressing

## 2024-10-30 NOTE — DISCHARGE INSTR - COC
Continuity of Care Form    Patient Name: Cleveland Thompson   :  1945  MRN:  [id number]    Admit date:  10/28/2024  Discharge date:  10/31/2024    Code Status Order: Full Code   Advance Directives:   Advance Care Flowsheet Documentation             Admitting Physician:  Miguel Atwood MD  PCP: Rafal Weber MD    Discharging Nurse: SHAREE Champion  Discharging Hospital Unit/Room#: 5203/5203-A  Discharging Unit Phone Number: [phone]    Emergency Contact:   Extended Emergency Contact Information  Primary Emergency Contact: Donna  Address: [address] [place] [address]  Home Phone: [phone]  Mobile Phone: [phone]  Relation: Spouse    Past Surgical History:  Past Surgical History:   Procedure Laterality Date    ABDOMINAL AORTIC ANEURYSM REPAIR      2018    DIALYSIS FISTULA CREATION Left 2023    creation AVF left arm performed by Mariana Gutierrez [place] [facility]    EP DEVICE PROCEDURE N/A 2024    Medtronic LINQ insert performed by Johnson Gonsalves MD [facility]    HERNIA REPAIR      OPEN REPAIR PERIPHERAL ANEURYSM Bilateral 2018    TONSILLECTOMY      VASCULAR SURGERY Left 2023    insertion AVF left arm performed by Mariana Gutierrez MD at [facility]       Immunization History:   Immunization History   Administered Date(s) Administered    COVID-19, PFIZER PURPLE top, DILUTE for use, (age 12 y+), 30mcg/0.3mL 2021, 2021, 2021    TD 2LF, TDVAX, (age 7y+), IM, 0.5mL 2022    TDaP, ADACEL (age 10y-64y), BOOSTRIX (age 10y+), IM, 0.5mL 10/14/2017, 2024       Active Problems:  Patient Active Problem List   Diagnosis Code    Aneurysm of abdominal aorta (HCC) I71.40    History of DVT (deep vein thrombosis) Z86.718    Iliac artery aneurysm, bilateral (HCC) I72.3    History of tobacco use Z87.891    Open leg wound, right, subsequent encounter S81.801D    Anemia of chronic renal failure, stage 5 (HCC)  4 carbs/meal (1800kcals/day), Renal, and Low Potassium    Routes of Feeding: Oral  Liquids: No Restrictions  Daily Fluid Restriction: no/ dialysis patient  Last Modified Barium Swallow with Video (Video Swallowing Test): not done    Treatments at the Time of Hospital Discharge:   Respiratory Treatments: none   Oxygen Therapy:  is not on home oxygen therapy.  Ventilator:    - No ventilator support    Rehab Therapies: Physical Therapy, Occupational Therapy, Orthotics/Prosthetics, and Speech/Language Therapy  Weight Bearing Status/Restrictions: No weight bearing restrictions  Other Medical Equipment (for information only, NOT a DME order):  walker, hospital bed, and braces Custom Cervical Collar   Other Treatments: ***    Patient's personal belongings (please select all that are sent with patient):  Glasses, Dentures upper    RN SIGNATURE:  Electronically signed by Jaycee Lunsford RN on 10/31/24 at 6:17 PM EDT    CASE MANAGEMENT/SOCIAL WORK SECTION    Inpatient Status Date: ***    Readmission Risk Assessment Score:  Readmission Risk              Risk of Unplanned Readmission:  17           Discharging to Facility/ Agency   Name:   [facility]   Address:  Phone:  Fax:    Dialysis Facility (if applicable)   Name:  Address:  Dialysis Schedule:  Phone:  Fax:    / signature: Electronically signed by Pili Barnes RN CM DISCHARGE PLANNER on 10/30/2024 at 1:57 PM      PHYSICIAN SECTION    Prognosis: {Prognosis:1293385573}    Condition at Discharge: { Patient Condition:424349534}    Rehab Potential (if transferring to Rehab): {Prognosis:1168335049}    Recommended Labs or Other Treatments After Discharge: ***    Physician Certification: I certify the above information and transfer of Cleveland Thompson  is necessary for the continuing treatment of the diagnosis listed and that he requires {Admit to Appropriate Level of Care:46990} for {GREATER/LESS:492484890} 30 days.     Update Admission H&P: {CHP

## 2024-10-30 NOTE — PROGRESS NOTES
[facility]  PROGRESS NOTE  ATTENDING NOTE        TRAUMA  MECHANISM:  mechanical fall    Chief Complaint   Patient presents with    Fall     Trauma consult, transfer from [facility]. Fall at home. C4 fx, left rib fxs 3 and 7       HPI The patient is a 80 y/o male who sustained a GLF yesterday.  He gets the shakes from time to time and his legs will give out.  The patient reported  acute, constant  sharp pain localized to the c-spine that started immediately. The intensity of the pain is 2/10.  Pain does not radiate. There are no alleviating or worsening factors regarding the pain.  The patient was transported by EMS to the [facility] from [facility].   Evaluation prior to arrival included: CT head, c-spine.  Treatment prior to arrival included: c-collar.  A trauma consult was requested to assist, guide,  and expedite further evaluation and treatment for the patient.      Patient Active Problem List   Diagnosis    Aneurysm of abdominal aorta ([facility])    History of DVT (deep vein thrombosis)    Iliac artery aneurysm, bilateral ([facility])    History of tobacco use    Open leg wound, right, subsequent encounter    Anemia of chronic renal failure, stage 5 ([facility])    Encounter regarding vascular access for dialysis for ESRD ([facility])    End stage renal disease ([facility])    Dizziness    Sinus bradycardia    Cervical compression fracture, initial encounter ([facility])    Closed nondisplaced fracture of fourth cervical vertebra ([facility])    Fall       SUBJECTIVE/OVERNIGHT EVENTS:  Admitted, no new issues, sitting in chair    Review of Systems   Constitutional:  Positive for activity change. Negative for appetite change and unexpected weight change.   HENT: Negative.     Eyes: Negative.    Respiratory: Negative.  Negative for cough and shortness of breath.    Cardiovascular: Negative.  Negative for chest pain and leg swelling.   Gastrointestinal: Negative.  Negative for abdominal distention, abdominal  pain, anal bleeding, blood in stool, constipation, diarrhea, nausea and vomiting.   Endocrine: Negative.    Genitourinary: Negative.    Musculoskeletal:  Positive for neck pain and neck stiffness. Negative for arthralgias, back pain, gait problem, joint swelling and myalgias.   Skin: Negative.    Allergic/Immunologic: Negative.    Neurological: Negative.  Negative for dizziness, weakness and headaches.   Hematological: Negative.    Psychiatric/Behavioral:  Positive for confusion (intermittent). Negative for decreased concentration and sleep disturbance.        /62   Pulse 51   Temp 97.7 °F (36.5 °C) (Temporal)   Resp 16   Ht 1.778 m (5' 10\")   Wt 65.8 kg (145 lb)   SpO2 92%   BMI 20.81 kg/m²   Physical Exam  Constitutional:       Appearance: Normal appearance.   HENT:      Head: Normocephalic and atraumatic.      Nose: Nose normal.      Mouth/Throat:      Mouth: Mucous membranes are moist.      Pharynx: Oropharynx is clear.   Eyes:      Extraocular Movements: Extraocular movements intact.      Pupils: Pupils are equal, round, and reactive to light.   Neck:      Comments: In c-collar  Cardiovascular:      Rate and Rhythm: Normal rate and regular rhythm.      Pulses: Normal pulses.      Heart sounds: Normal heart sounds.   Pulmonary:      Effort: Pulmonary effort is normal.      Breath sounds: Normal breath sounds.   Abdominal:      General: There is no distension.      Palpations: Abdomen is soft.      Tenderness: There is no abdominal tenderness.   Musculoskeletal:         General: No tenderness or signs of injury.   Skin:     General: Skin is warm and dry.   Neurological:      General: No focal deficit present.      Mental Status: He is alert and oriented to person, place, and time.   Psychiatric:         Mood and Affect: Mood normal.         Behavior: Behavior normal.         Thought Content: Thought content normal.         Judgment: Judgment normal.         ASSESSMENT/PLAN:  C4,6 fracture--analgesia,

## 2024-10-30 NOTE — PROGRESS NOTES
OCCUPATIONAL THERAPY INITIAL EVALUATION    [facility]  [address] [place], [place]        Date:10/30/2024                                                  Patient Name: Cleveland Thompson    MRN: [id number]    : 1945    Room: [address] [place] [address]      Evaluating OT: Thanh Mccray OTR/L; [id number]       Referring Provider: Keeley Rios MD     Specific Provider Orders/Date: OT Eval and Treat 10/29/24 0515       Diagnosis: Fall at home -  imaging ID'd C4 fx & L 3/7 rib fx's.    Surgery: None this admission.     Pertinent Medical History:  has a past medical history of Aneurysm of abdominal aorta (HCC), Deep vein thrombosis of calf (HCC), Gout, History of DVT (deep vein thrombosis), History of tobacco use, Hypertension, Iliac artery aneurysm, bilateral (HCC), Loss of weight, and Right leg DVT (HCC).     Recommended Adaptive Equipment: BSC, AE for LE bathing and dressing PRN     Precautions:  Fall Risk, c-spine precautions, c-collar, L rib fx's (3&7), h/o dementia, cognition, +alarms, TSM, HD, monitor BP     Assessment of current deficits    [x] Functional mobility  [x]ADLs  [x] Strength               [x]Cognition    [x] Functional transfers   [x] IADLs         [x] Safety Awareness   [x]Endurance    [x] Fine Coordination              [x] Balance      [] Vision/perception   []Sensation     []Gross Motor Coordination  [] ROM  [] Delirium                   [] Motor Control     OT PLAN OF CARE   OT POC based on physician orders, patient diagnosis and results of clinical assessment    Frequency/Duration 1-3 days/wk for 2 weeks PRN   Specific OT Treatment Interventions to include:   * Instruction/training on adapted ADL techniques and AE recommendations to increase functional independence within precautions       * Training on energy conservation strategies, correct breathing pattern and techniques to improve independence/tolerance for self-care routine  *

## 2024-10-30 NOTE — ACP (ADVANCE CARE PLANNING)
Advance Care Planning   The patient has the following advanced directives on file:  Advance Directives       Power of  Living Will ACP-Advance Directive ACP-Power of     Not on File Not on File Not on File Not on File            The patient has appointed the following active healthcare agents:    Primary Decision Maker: Kathleen Thompson - [facility] - [phone]    The Patient has the following current code status:    Code Status: Full Code    Visit Documentation:      Pili Barnes RN  10/30/2024

## 2024-10-30 NOTE — PROGRESS NOTES
Nurse messaged Dr. Yee regarding patients blood pressure of 72/40, 2.5mg of Midodrine was given. Awaiting response.     1240: Two boluses completed, doctor updated.

## 2024-10-30 NOTE — DISCHARGE SUMMARY
Physician Discharge Summary     Patient ID:  Cleveland Thompson  [id number]  79 y.o.  1945    Admit date: 10/28/2024    Discharge date and time: 10/31/24 3:08 PM     Admitting Physician: Miguel Atwood MD     Admission Diagnoses: Cervical compression fracture, initial encounter ([facility]) [S12.9XXA]  Fall, initial encounter [W19.XXXA]  Closed nondisplaced fracture of fourth cervical vertebra, unspecified fracture morphology, initial encounter ([facility]) [S12.301A]    Discharge Diagnoses: Principal Problem:    Cervical compression fracture, initial encounter ([facility])  Active Problems:    Closed nondisplaced fracture of fourth cervical vertebra ([facility])    Fall  Resolved Problems:    * No resolved hospital problems. *      Admission Condition: fair    Discharged Condition: stable    Indication for Admission: C4 and C6 fracture    Hospital Course/Procedures/Operation/treatments:   10/29: Transfer from [facility]. Patient had mechanical fall at home. Wife states that he got up from the chair and then while walking his legs started shaking and then he fell backward. Hit his head, no loss of consciousness. Found to have C4 fracture and was transferred to [facility] for neurosurgical evaluation. CT head and chest negative for acute injury. GCS 14. No complaints. Seen by NSGY and placed in custom cervical collar.   10/30: No issues overnight. Agitated this morning. [facility] 14/24.   10/31: No new issues. Wife thinks pt is more confused today which is what happens to him when he is dehydrated per her words. Notes that it happens after he gets dialyzed. She wanted a fluid bolus. Pt received a 250 bolus. He got accepted and is stable for discharg to [facility].     Consults:   IP CONSULT TO TRAUMA SURGERY  IP CONSULT TO NEUROSURGERY  IP CONSULT TO NEPHROLOGY  INPATIENT CONSULT TO ORTHOTIST/PROSTHETIST    Significant Diagnostic Studies:   CTA NECK W CONTRAST    Result Date: 10/29/2024  EXAMINATION: CTA OF THE NECK 10/29/2024 8:34 am TECHNIQUE: CTA of the  pm COMPARISON: Correlation made with CT chest from 06/23/2024 HISTORY: ORDERING SYSTEM PROVIDED HISTORY: fall, anterior rib pain TECHNOLOGIST PROVIDED HISTORY: Reason for exam:->fall, anterior rib pain FINDINGS: There is a subtle cortical irregularity involving lateral left 3rd rib. Cortical irregularity is also seen involving lateral left 4th rib.  Subtle cortical irregularity is also seen involving lateral left 7th rib.  No pneumothorax.  The heart is normal in size.  No airspace opacity or pleural effusion.  No pneumothorax.  Implantable cardiac monitor is present.     1. Subtle cortical irregularities are seen involving lateral left 3rd and 7th ribs which may indicate recent fractures. 2. There is a chronic fracture involving lateral left 4th rib. 3. No airspace opacity or pleural effusion.       Discharge Exam:  GENERAL:  NAD. A&Ox2. Custom collar in place.   LUNGS:  No increased work of breathing.  CARDIOVASCULAR: RR  ABDOMEN:  Soft, non-distended, non-tender. No guarding, rigidity, rebound.    Disposition: SNF    In process/preliminary results:  Outstanding Order Results       No orders found from 9/29/2024 to 10/29/2024.            Patient Instructions:   Current Discharge Medication List             Details   tiZANidine (ZANAFLEX) 4 MG tablet Take 0.5 tablets by mouth every 6 hours as needed (Pain)  Qty: 6 tablet, Refills: 0      polyethylene glycol (GLYCOLAX) 17 g packet Take 1 packet by mouth daily as needed for Constipation  Qty: 7 packet, Refills: 0      bacitracin zinc 500 UNIT/GM ointment Apply topically 2 times daily.  Qty: 30 g, Refills: 1      acetaminophen (TYLENOL) 325 MG tablet Take 2 tablets by mouth every 4 hours as needed for Pain  Qty: 84 tablet, Refills: 0                Details   midodrine (PROAMATINE) 2.5 MG tablet Take 1 tablet by mouth in the morning and at bedtime Takes 5 mg twice daily on dialysis days.  Qty: 120 tablet, Refills: 5      rosuvastatin (CRESTOR) 10 MG tablet TAKE ONE

## 2024-10-30 NOTE — CARE COORDINATION
10/30/24 Update CM Note: Referral called to Zeina at [facility] for review.Electronically signed by Pili Barnes RN CM on 10/30/2024 at 10:26 AM

## 2024-10-31 VITALS
OXYGEN SATURATION: 97 % | RESPIRATION RATE: 18 BRPM | WEIGHT: 142.64 LBS | HEIGHT: 70 IN | BODY MASS INDEX: 20.42 KG/M2 | HEART RATE: 63 BPM | TEMPERATURE: 97.1 F | DIASTOLIC BLOOD PRESSURE: 58 MMHG | SYSTOLIC BLOOD PRESSURE: 110 MMHG

## 2024-10-31 LAB
ANION GAP SERPL CALCULATED.3IONS-SCNC: 14 MMOL/L (ref 7–16)
BASOPHILS # BLD: 0.06 K/UL (ref 0–0.2)
BASOPHILS NFR BLD: 1 % (ref 0–2)
BUN SERPL-MCNC: 41 MG/DL (ref 6–23)
CALCIUM SERPL-MCNC: 8.5 MG/DL (ref 8.6–10.2)
CHLORIDE SERPL-SCNC: 97 MMOL/L (ref 98–107)
CO2 SERPL-SCNC: 26 MMOL/L (ref 22–29)
CREAT SERPL-MCNC: 6.8 MG/DL (ref 0.7–1.2)
EOSINOPHIL # BLD: 0.19 K/UL (ref 0.05–0.5)
EOSINOPHILS RELATIVE PERCENT: 2 % (ref 0–6)
ERYTHROCYTE [DISTWIDTH] IN BLOOD BY AUTOMATED COUNT: 15 % (ref 11.5–15)
GFR, ESTIMATED: 8 ML/MIN/1.73M2
GLUCOSE SERPL-MCNC: 94 MG/DL (ref 74–99)
HCT VFR BLD AUTO: 31.7 % (ref 37–54)
HGB BLD-MCNC: 10.2 G/DL (ref 12.5–16.5)
IMM GRANULOCYTES # BLD AUTO: 0.05 K/UL (ref 0–0.58)
IMM GRANULOCYTES NFR BLD: 1 % (ref 0–5)
LYMPHOCYTES NFR BLD: 1.48 K/UL (ref 1.5–4)
LYMPHOCYTES RELATIVE PERCENT: 17 % (ref 20–42)
MAGNESIUM SERPL-MCNC: 2.4 MG/DL (ref 1.6–2.6)
MCH RBC QN AUTO: 32 PG (ref 26–35)
MCHC RBC AUTO-ENTMCNC: 32.2 G/DL (ref 32–34.5)
MCV RBC AUTO: 99.4 FL (ref 80–99.9)
MONOCYTES NFR BLD: 0.85 K/UL (ref 0.1–0.95)
MONOCYTES NFR BLD: 10 % (ref 2–12)
NEUTROPHILS NFR BLD: 70 % (ref 43–80)
NEUTS SEG NFR BLD: 6.23 K/UL (ref 1.8–7.3)
PHOSPHATE SERPL-MCNC: 4.6 MG/DL (ref 2.5–4.5)
PLATELET # BLD AUTO: 138 K/UL (ref 130–450)
PMV BLD AUTO: 9.7 FL (ref 7–12)
POTASSIUM SERPL-SCNC: 4.2 MMOL/L (ref 3.5–5)
RBC # BLD AUTO: 3.19 M/UL (ref 3.8–5.8)
SODIUM SERPL-SCNC: 137 MMOL/L (ref 132–146)
WBC OTHER # BLD: 8.9 K/UL (ref 4.5–11.5)

## 2024-10-31 PROCEDURE — 2580000003 HC RX 258

## 2024-10-31 PROCEDURE — 6370000000 HC RX 637 (ALT 250 FOR IP)

## 2024-10-31 PROCEDURE — 80048 BASIC METABOLIC PNL TOTAL CA: CPT

## 2024-10-31 PROCEDURE — 36415 COLL VENOUS BLD VENIPUNCTURE: CPT

## 2024-10-31 PROCEDURE — 2580000003 HC RX 258: Performed by: SURGERY

## 2024-10-31 PROCEDURE — 83735 ASSAY OF MAGNESIUM: CPT

## 2024-10-31 PROCEDURE — 85025 COMPLETE CBC W/AUTO DIFF WBC: CPT

## 2024-10-31 PROCEDURE — 90935 HEMODIALYSIS ONE EVALUATION: CPT

## 2024-10-31 PROCEDURE — 84100 ASSAY OF PHOSPHORUS: CPT

## 2024-10-31 RX ORDER — 0.9 % SODIUM CHLORIDE 0.9 %
250 INTRAVENOUS SOLUTION INTRAVENOUS ONCE
Status: COMPLETED | OUTPATIENT
Start: 2024-10-31 | End: 2024-10-31

## 2024-10-31 RX ORDER — ACETAMINOPHEN 325 MG/1
650 TABLET ORAL EVERY 4 HOURS PRN
Qty: 84 TABLET | Refills: 0 | Status: SHIPPED | OUTPATIENT
Start: 2024-10-31 | End: 2024-11-07

## 2024-10-31 RX ORDER — BACITRACIN ZINC 500 [USP'U]/G
OINTMENT TOPICAL
Qty: 30 G | Refills: 1 | Status: SHIPPED | OUTPATIENT
Start: 2024-10-31 | End: 2024-11-10

## 2024-10-31 RX ORDER — POLYETHYLENE GLYCOL 3350 17 G/17G
17 POWDER, FOR SOLUTION ORAL DAILY PRN
Qty: 7 PACKET | Refills: 0 | Status: SHIPPED | OUTPATIENT
Start: 2024-10-31 | End: 2024-11-07

## 2024-10-31 RX ADMIN — METHOCARBAMOL TABLETS 750 MG: 750 TABLET, COATED ORAL at 11:06

## 2024-10-31 RX ADMIN — MIDODRINE HYDROCHLORIDE 2.5 MG: 2.5 TABLET ORAL at 17:08

## 2024-10-31 RX ADMIN — METHOCARBAMOL TABLETS 750 MG: 750 TABLET, COATED ORAL at 20:55

## 2024-10-31 RX ADMIN — SODIUM CHLORIDE, PRESERVATIVE FREE 10 ML: 5 INJECTION INTRAVENOUS at 14:43

## 2024-10-31 RX ADMIN — SODIUM CHLORIDE 250 ML: 9 INJECTION, SOLUTION INTRAVENOUS at 14:42

## 2024-10-31 RX ADMIN — METHOCARBAMOL TABLETS 750 MG: 750 TABLET, COATED ORAL at 17:06

## 2024-10-31 RX ADMIN — APIXABAN 2.5 MG: 5 TABLET, FILM COATED ORAL at 20:55

## 2024-10-31 RX ADMIN — ALLOPURINOL 100 MG: 100 TABLET ORAL at 11:08

## 2024-10-31 RX ADMIN — ROSUVASTATIN 10 MG: 20 TABLET, FILM COATED ORAL at 20:55

## 2024-10-31 RX ADMIN — MIDODRINE HYDROCHLORIDE 2.5 MG: 2.5 TABLET ORAL at 11:08

## 2024-10-31 RX ADMIN — APIXABAN 2.5 MG: 5 TABLET, FILM COATED ORAL at 11:08

## 2024-10-31 ASSESSMENT — ENCOUNTER SYMPTOMS
VOMITING: 0
ALLERGIC/IMMUNOLOGIC NEGATIVE: 1
EYES NEGATIVE: 1
DIARRHEA: 0
COUGH: 0
SHORTNESS OF BREATH: 0
ANAL BLEEDING: 0
CONSTIPATION: 0
ABDOMINAL PAIN: 0
RESPIRATORY NEGATIVE: 1
NAUSEA: 0
GASTROINTESTINAL NEGATIVE: 1
BACK PAIN: 0
BLOOD IN STOOL: 0
ABDOMINAL DISTENTION: 0

## 2024-10-31 ASSESSMENT — PAIN DESCRIPTION - ORIENTATION
ORIENTATION: POSTERIOR;MID
ORIENTATION: POSTERIOR

## 2024-10-31 ASSESSMENT — PAIN - FUNCTIONAL ASSESSMENT
PAIN_FUNCTIONAL_ASSESSMENT: PREVENTS OR INTERFERES SOME ACTIVE ACTIVITIES AND ADLS
PAIN_FUNCTIONAL_ASSESSMENT: PREVENTS OR INTERFERES SOME ACTIVE ACTIVITIES AND ADLS

## 2024-10-31 ASSESSMENT — PAIN DESCRIPTION - DESCRIPTORS
DESCRIPTORS: ACHING;DULL;DISCOMFORT
DESCRIPTORS: ACHING;SORE;TENDER

## 2024-10-31 ASSESSMENT — PAIN DESCRIPTION - LOCATION
LOCATION: NECK
LOCATION: NECK

## 2024-10-31 ASSESSMENT — PAIN SCALES - GENERAL
PAINLEVEL_OUTOF10: 0
PAINLEVEL_OUTOF10: 5

## 2024-10-31 NOTE — PLAN OF CARE
Problem: Safety - Adult  Goal: Free from fall injury  Outcome: Progressing     Problem: Discharge Planning  Goal: Discharge to home or other facility with appropriate resources  Outcome: Progressing     Problem: Skin/Tissue Integrity  Goal: Absence of new skin breakdown  Description: 1.  Monitor for areas of redness and/or skin breakdown  2.  Assess vascular access sites hourly  3.  Every 4-6 hours minimum:  Change oxygen saturation probe site  4.  Every 4-6 hours:  If on nasal continuous positive airway pressure, respiratory therapy assess nares and determine need for appliance change or resting period.  Outcome: Progressing     Problem: ABCDS Injury Assessment  Goal: Absence of physical injury  Outcome: Progressing     Problem: Confusion  Goal: Confusion, delirium, dementia, or psychosis is improved or at baseline  Description: INTERVENTIONS:  1. Assess for possible contributors to thought disturbance, including medications, impaired vision or hearing, underlying metabolic abnormalities, dehydration, psychiatric diagnoses, and notify attending LIP  2. [place] high risk fall precautions, as indicated  3. Provide frequent short contacts to provide reality reorientation, refocusing and direction  4. Decrease environmental stimuli, including noise as appropriate  5. Monitor and intervene to maintain adequate nutrition, hydration, elimination, sleep and activity  6. If unable to ensure safety without constant attention obtain sitter and review sitter guidelines with assigned personnel  7. Initiate Psychosocial CNS and Spiritual Care consult, as indicated  Outcome: Progressing

## 2024-10-31 NOTE — PROGRESS NOTES
Upon visiting patient and wife I listened and we shared our love for the [employer] people. We had prayer and offered to give Daily Bread which they accepted.      If additional support is needed or requested, please reach out to Spiritual Health ([phone])    Chap. Ellie Rangel       10/31/24 9101   Encounter Summary   Encounter Overview/Reason Initial Encounter;Spiritual/Emotional Needs   Service Provided For Patient;Significant other   [facility]   Support System Spouse   Last Encounter  10/31/24  (CY)   Complexity of Encounter Moderate   Spiritual/Emotional needs   Type Spiritual Support   Assessment/Intervention/Outcome   Assessment Calm;Coping;Hopeful   Intervention Active listening;Nurtured Hope;Prayer (assurance of)/[place]es;Read/Provided Scripture;Sustaining Presence/Ministry of presence   Outcome Acceptance;Coping;Encouraged;Engaged in conversation;Expressed Gratitude;Receptive

## 2024-10-31 NOTE — PROGRESS NOTES
GENERAL SURGERY  DAILY PROGRESS NOTE  10/31/2024    CHIEF COMPLAINT:  Chief Complaint   Patient presents with    Fall     Trauma consult, transfer from [facility]. Fall at home. C4 fx, left rib fxs 3 and 7       SUBJECTIVE:  No issues overnight. Resting comfortably.     OBJECTIVE:  BP (!) 135/59   Pulse 57   Temp 97.8 °F (36.6 °C) (Temporal)   Resp 18   Ht 1.778 m (5' 10\")   Wt 71.5 kg (157 lb 11.2 oz)   SpO2 98%   BMI 22.63 kg/m²     GENERAL:  NAD. A&Ox2. Custom collar in place.   LUNGS:  No increased work of breathing.  CARDIOVASCULAR: RR  ABDOMEN:  Soft, non-distended, non-tender. No guarding, rigidity, rebound.    ASSESSMENT/PLAN:  79 y.o. male s/p mechanical fall with C4, C6 fracture     Plan:  C4 fracture - continue custom collar, follow up with NSGY in 4 weeks with xrays.  Hx dementia - continue prevagen.   Hx Afib - continue Eliquis.   Hx hypotension - continue midodrine.  Hx ESRD on HD - nephro following, continue HD TThS   PT/OT  Dispo - accepted at [facility], wife wants to take patient home    Keeley Rios MD  Surgery Resident PGY-1  10/31/2024  8:43 AM

## 2024-10-31 NOTE — CARE COORDINATION
10/31/24 Update CM Note: Per note from Dr Finn patient can discharge to skilled once the iv bolus is completed. [facility] ambulance to  patient at 6:00pm via stretcher to transport to [facility]. Dr Yee notified. Wife at the bedside. Nick from [facility] notified. All paper work done. Electronically signed by Pili Barnes RN CM on 10/31/2024 at 2:56 PM

## 2024-10-31 NOTE — PROGRESS NOTES
10/28/24  1641 10/30/24  0559 10/31/24  0521   WBC 8.7 8.2 8.9   HGB 12.5 11.8* 10.2*   HCT 39.9 38.7 31.7*   .6* 104.3* 99.4    145 138       Recent Labs     10/29/24  0526 10/30/24  0559 10/31/24  0521    134 137   K 4.8 4.4 4.2   CL 96* 95* 97*   CO2 28 27 26   CREATININE 7.9* 5.2* 6.8*   BUN 40* 22 41*   LABGLOM 6* 11* 8*   GLUCOSE 101* 83 94   CALCIUM 8.8 8.5* 8.5*   PHOS  --  4.1 4.6*   MG  --  2.4 2.4       Vit D, 25-Hydroxy   Date Value Ref Range Status   02/21/2022 60 30 - 100 ng/mL Final     Comment:     <20 ng/mL.............Deficient  20-30 ng/mL...........Insufficient   ng/mL..........Sufficient  >100 ng/mL............Toxic         No results found for: \"PTH\"    No results for input(s): \"ALT\", \"AST\", \"ALKPHOS\", \"BILITOT\", \"BILIDIR\" in the last 72 hours.    No results for input(s): \"LABALBU\" in the last 72 hours.    Iron   Date Value Ref Range Status   02/21/2022 59 59 - 158 mcg/dL Final     TIBC   Date Value Ref Range Status   02/21/2022 207 (L) 250 - 450 mcg/dL Final       No results found for: \"LXCCABUJ13\"    No results found for: \"FOLATE\"    Lab Results   Component Value Date/Time    COLORU Yellow 03/13/2020 06:10 AM    NITRU Negative 03/13/2020 06:10 AM    GLUCOSEU Negative 03/13/2020 06:10 AM    GLUCOSEU NEGATIVE 08/24/2011 08:00 AM    KETUA Negative 03/13/2020 06:10 AM    UROBILINOGEN 0.2 03/13/2020 06:10 AM    BILIRUBINUR Negative 03/13/2020 06:10 AM    BILIRUBINUR NEGATIVE 08/24/2011 08:00 AM       Lab Results   Component Value Date/Time    PROTEIN 6.7 05/27/2023 06:56 PM       No components found for: \"URIC\"    No results found for: \"LIPIDPAN\"    Assessment and Plans:    ESRD on HD  outpatient at [facility] TTS via left arm AV fistula  Monitor labs  Continue HD TTS while inpatient-HD today    2.  Fall/cervical fracture  Left rib XR 10/28-subtle cortical irregularity seen involving lateral L 3rd and 7th ribs which may indicate recent fx  CT head 10/28 - no acute  intracranial abnormality  CT cervical spine 10/28 - acute nondisplaced fracture seen involving anterior inferior corner of C4 vertebral body  CT chest 10/28 - no acute traumatic findings, chronic liver disease  Neurosurgery / trauma surgery following    3.  Secondary hyperparathyroidism of renal origin   and phosphorus 4.7 on 10/17 in the outpatient setting  Phosphorus 4.6 today  Monitor labs    4.  Intermittent hypotension  On midodrine  Monitor BPs    5.  Anemia with CKD/ESRD  Hemoglobin target 10-12  Hemoglobin 10.2-at target  No ANA LAURA as hemoglobin at target  Monitor H&H    ABRIL Temple - CNP    Patient seen and examined all key components of the physical performed independently , case discussed with NP, all pertinent labs and radiologic tests personally reviewed agree with above.      Patient was seen on dialysis .  They have no complaints with the current treatment.  No issues during therapy.  Machine and tubing  inspected.  No issues with current  treatment.  See dialysis orders for further details on current prescription.       Wilder De León MD

## 2024-10-31 NOTE — PROGRESS NOTES
Called N2N to SHAREE Champion at [facility]. All questions answered. Faxed information to [phone].

## 2024-10-31 NOTE — PROGRESS NOTES
[facility]  PROGRESS NOTE  ATTENDING NOTE        TRAUMA  MECHANISM:  mechanical fall    Chief Complaint   Patient presents with    Fall     Trauma consult, transfer from [facility]. Fall at home. C4 fx, left rib fxs 3 and 7       HPI The patient is a 78 y/o male who sustained a GLF yesterday.  He gets the shakes from time to time and his legs will give out.  The patient reported  acute, constant  sharp pain localized to the c-spine that started immediately. The intensity of the pain is 2/10.  Pain does not radiate. There are no alleviating or worsening factors regarding the pain.  The patient was transported by EMS to the [facility] from [facility].   Evaluation prior to arrival included: CT head, c-spine.  Treatment prior to arrival included: c-collar.  A trauma consult was requested to assist, guide,  and expedite further evaluation and treatment for the patient.      Patient Active Problem List   Diagnosis    Aneurysm of abdominal aorta ([facility])    History of DVT (deep vein thrombosis)    Iliac artery aneurysm, bilateral ([facility])    History of tobacco use    Open leg wound, right, subsequent encounter    Anemia of chronic renal failure, stage 5 ([facility])    Encounter regarding vascular access for dialysis for ESRD ([facility])    End stage renal disease ([facility])    Dizziness    Sinus bradycardia    Cervical compression fracture, initial encounter ([facility])    Closed nondisplaced fracture of fourth cervical vertebra ([facility])    Fall       SUBJECTIVE/OVERNIGHT EVENTS:  Wife at bedside.  She thinks he is more confused today and states he gets this way when he gets dehydrated. This sometimes happens after dialysis.  She wants him to get a fluid bolus.      Review of Systems   Constitutional:  Positive for activity change. Negative for appetite change and unexpected weight change.   HENT: Negative.     Eyes: Negative.    Respiratory: Negative.  Negative for cough and shortness of breath.

## 2024-10-31 NOTE — FLOWSHEET NOTE
10/31/24 1010   Vital Signs   BP (!) 111/56   Temp 97.3 °F (36.3 °C)   Pulse 61   Respirations 16   Weight - Scale 64.7 kg (142 lb 10.2 oz)   Weight Method Bed scale   Percent Weight Change -9.55   Post-Hemodialysis Assessment   Post-Treatment Procedures Blood returned;Access bleeding time < 10 minutes   Machine Disinfection Process Exterior Machine Disinfection   Rinseback Volume (ml) 300 ml   Blood Volume Processed (Liters) 66.2 L   Dialyzer Clearance Lightly streaked   Duration of Treatment (minutes) 210 minutes   Heparin Amount Administered During Treatment (mL) 0 mL   Hemodialysis Intake (ml) 300 ml   Hemodialysis Output (ml) 900 ml   NET Removed (ml) 600   Tolerated Treatment Good   Patient Response to Treatment tolerated well, blood returned, needles pulled, sites held, stasis achieved, bandaids applied, +thirll, +bruit

## 2024-10-31 NOTE — CARE COORDINATION
10/31/24 Update CM Note: patient is approved for discharge to skilled facility. Perfect serve sent to trauma to check on discharge for today. Electronically signed by Pili Barnes RN CM on 10/31/2024 at 8:07 AM

## 2024-11-01 NOTE — PROGRESS NOTES
Pt educated on discharge. Transport arrived at 2105. LDA discontinued. Belongings including watch, glasses, and dentures left with transport.

## 2024-11-01 NOTE — PROGRESS NOTES
Physician Progress Note      PATIENT:               CASSIUS DINH  [facility] #:                  [id number]  :                       1945  ADMIT DATE:       10/28/2024 11:49 PM  DISCH DATE:        10/31/2024 9:30 PM  RESPONDING  PROVIDER #:        Eryn Finn MD          QUERY TEXT:    Pt admitted with cervical fractures. Pt had a fall at home, also noted to have   osteopenia on CT neck. If possible, please document in progress notes and   discharge summary if you are evaluating and/or treating any of the following:    The medical record reflects the following:  Risk Factors: fall, osteopenia  Clinical Indicators: Per notes, mechanical fall at home. Got up from chair and   while walking his legs started to shake and he fell backwards. CTA Neck: The   osseous structures appear osteopenic. Fractures are seen involving the   anterior/inferior corners of the C4 and C6 vertebral bodies.  Treatment: imaging, c-collar  Options provided:  -- Pathological cervical fractures due to osteopenia following fall which   would not usually break a normal, healthy bone  -- Traumatic cervical fractures  -- Other - I will add my own diagnosis  -- Disagree - Not applicable / Not valid  -- Disagree - Clinically unable to determine / Unknown  -- Refer to Clinical Documentation Reviewer    PROVIDER RESPONSE TEXT:    Traumatic cervical fractures    Query created by: Marilyn Wilkins on 10/31/2024 7:58 AM      Electronically signed by:  Eryn Finn MD 2024 10:58 AM

## 2024-11-01 NOTE — PROGRESS NOTES
After getting call from floor RE discharge at 2016 I called [facility] and explained that he is going to [facility] and has to be there by 2100. They are to assign their crew that is on their way to this facility doing a drop off to get him. No other companies available to outsource before 2100

## 2024-11-11 DIAGNOSIS — S12.301A CLOSED NONDISPLACED FRACTURE OF FOURTH CERVICAL VERTEBRA, UNSPECIFIED FRACTURE MORPHOLOGY, INITIAL ENCOUNTER (HCC): Primary | ICD-10-CM

## 2024-11-13 ENCOUNTER — TELEPHONE (OUTPATIENT)
Dept: NEUROSURGERY | Age: 79
End: 2024-11-13

## 2024-11-13 NOTE — TELEPHONE ENCOUNTER
Meryl called from [facility]. She has a question on his cervical collar. Can it be removed for showering?  Or if sitting down?    His next appt is 12-11    [phone]

## 2024-11-13 NOTE — TELEPHONE ENCOUNTER
Okay to remove C-Collar for hygiene needs (showering and shaving), otherwise would like to keep collar on.

## 2024-11-15 ENCOUNTER — OFFICE VISIT (OUTPATIENT)
Dept: NON INVASIVE DIAGNOSTICS | Age: 79
End: 2024-11-15

## 2024-11-15 ENCOUNTER — TELEPHONE (OUTPATIENT)
Dept: NEUROSURGERY | Age: 79
End: 2024-11-15

## 2024-11-15 VITALS
SYSTOLIC BLOOD PRESSURE: 100 MMHG | WEIGHT: 153 LBS | HEART RATE: 55 BPM | BODY MASS INDEX: 25.49 KG/M2 | RESPIRATION RATE: 16 BRPM | TEMPERATURE: 98 F | DIASTOLIC BLOOD PRESSURE: 58 MMHG | HEIGHT: 65 IN

## 2024-11-15 DIAGNOSIS — Z95.818 IMPLANTABLE LOOP RECORDER PRESENT: ICD-10-CM

## 2024-11-15 DIAGNOSIS — R00.1 SINUS BRADYCARDIA: ICD-10-CM

## 2024-11-15 DIAGNOSIS — R00.1 BRADYCARDIA: ICD-10-CM

## 2024-11-15 DIAGNOSIS — R42 DIZZINESS: ICD-10-CM

## 2024-11-15 DIAGNOSIS — S12.9XXA CERVICAL COMPRESSION FRACTURE, INITIAL ENCOUNTER (HCC): ICD-10-CM

## 2024-11-15 DIAGNOSIS — I51.9 HEART PROBLEM: Primary | ICD-10-CM

## 2024-11-15 RX ORDER — TIZANIDINE 2 MG/1
2 TABLET ORAL EVERY 6 HOURS PRN
COMMUNITY

## 2024-11-15 RX ORDER — POLYETHYLENE GLYCOL 3350 17 G/17G
17 POWDER, FOR SOLUTION ORAL DAILY
COMMUNITY

## 2024-11-15 NOTE — PROGRESS NOTES
segmental wall motion abnormalities.  -TID at upper limit of normal.  -Ejection fraction 75%.  -Low risk exercise nuclear stress test.     14 day MCOT 7/18/23:  Predominant underlying rhythm was Sinus Rhythm. 25 Supraventricular  Tachycardia runs occurred, the run with the longest lasting 14  beats. Isolated SVEs were occasional (1.5%, 5733). Isolated VEs  were rare (<1.0%). No VT or PAF. No pause or AV block. No triggered  events or symptoms reported.    48 Hour Holter Monitor 3/1/22:         [employer] (formerly [employer] Reveal LINQ  DOI : 1/26/2024  Battery : Good  3 episodes of AF, longest lasting 38 minutes, all on 10/29/2024 since remote transmissions.  Normal device function    Assessment:    1.  History of syncope  - Unclear etiology however based on history and physical this is most consistent with hypotension but can not fully rule out SND with no prodromal symptoms.  - EKG has not shown any other clear arrhythmogenic cause (WPW, HOCM, LQT, Brugada, ARVC)  - Other cardiac workup thus far has shown normal echocardiogram and stress test  - 14 day cardiac monitor showed asymptomatic SVTs.  - Advised life style modifications as below     - Make all postural changes from lying to sitting or sitting to standing slowly.      - Drink to 2.0 -2.5 L of fluids per day.      - Increase sodium in the diet to 3 - 5 g per day.     - Avoid large meals which can cause low blood pressure during digestion.     - Avoid alcohol and excessive caffeine intake.      - Perform lower extremity exercises to improve strength of the leg muscles.      - Use physical counter maneuvers such as leg crossing, or leg raising and resting the leg on a chair.      - Raise the head of the bed by 6 to 10 inches.     - Use custom fitted elastic support stockings.  - On Midodrine  -No evidence of significant arrhythmia on recent fall with cervical neck fracture.  He did not have syncope or blackout during fall.    2. Sinus bradycardia  - Asymptomatic.  - No

## 2024-11-15 NOTE — TELEPHONE ENCOUNTER
Patient's wife called and is asking about collar-if he can take it off while, showering, and shaving. Called and left a message that he can take the collar off for hygienic purposes but he needs to place the collar back on afterwards.

## 2024-11-20 ENCOUNTER — HOSPITAL ENCOUNTER (INPATIENT)
Age: 79
LOS: 16 days | Discharge: NURSING, REHAB OR LONG-TERM CARE | DRG: 242 | End: 2024-12-06
Attending: EMERGENCY MEDICINE | Admitting: FAMILY MEDICINE
Payer: MEDICARE

## 2024-11-20 ENCOUNTER — APPOINTMENT (OUTPATIENT)
Dept: CT IMAGING | Age: 79
DRG: 242 | End: 2024-11-20
Payer: MEDICARE

## 2024-11-20 ENCOUNTER — APPOINTMENT (OUTPATIENT)
Dept: GENERAL RADIOLOGY | Age: 79
DRG: 242 | End: 2024-11-20
Payer: MEDICARE

## 2024-11-20 DIAGNOSIS — G93.1 ANOXIC ENCEPHALOPATHY (HCC): ICD-10-CM

## 2024-11-20 DIAGNOSIS — I95.9 HYPOTENSION, UNSPECIFIED HYPOTENSION TYPE: ICD-10-CM

## 2024-11-20 DIAGNOSIS — I67.9: ICD-10-CM

## 2024-11-20 DIAGNOSIS — R42 DIZZINESS: ICD-10-CM

## 2024-11-20 DIAGNOSIS — R00.1 BRADYCARDIA: Primary | ICD-10-CM

## 2024-11-20 DIAGNOSIS — I49.5 SINUS NODE DYSFUNCTION (HCC): ICD-10-CM

## 2024-11-20 DIAGNOSIS — N18.6 END STAGE RENAL DISEASE (HCC): ICD-10-CM

## 2024-11-20 LAB
ALBUMIN SERPL-MCNC: 4.2 G/DL (ref 3.5–5.2)
ALP SERPL-CCNC: 113 U/L (ref 40–129)
ALT SERPL-CCNC: 12 U/L (ref 0–40)
ANION GAP SERPL CALCULATED.3IONS-SCNC: 18 MMOL/L (ref 7–16)
AST SERPL-CCNC: 14 U/L (ref 0–39)
BASOPHILS # BLD: 0.1 K/UL (ref 0–0.2)
BASOPHILS NFR BLD: 1 % (ref 0–2)
BILIRUB SERPL-MCNC: 0.3 MG/DL (ref 0–1.2)
BNP SERPL-MCNC: ABNORMAL PG/ML (ref 0–450)
BUN SERPL-MCNC: 53 MG/DL (ref 6–23)
CALCIUM SERPL-MCNC: 9 MG/DL (ref 8.6–10.2)
CHLORIDE SERPL-SCNC: 94 MMOL/L (ref 98–107)
CO2 SERPL-SCNC: 25 MMOL/L (ref 22–29)
CREAT SERPL-MCNC: 9.3 MG/DL (ref 0.7–1.2)
EOSINOPHIL # BLD: 0.17 K/UL (ref 0.05–0.5)
EOSINOPHILS RELATIVE PERCENT: 2 % (ref 0–6)
ERYTHROCYTE [DISTWIDTH] IN BLOOD BY AUTOMATED COUNT: 15.5 % (ref 11.5–15)
GFR, ESTIMATED: 5 ML/MIN/1.73M2
GLUCOSE SERPL-MCNC: 186 MG/DL (ref 74–99)
HCT VFR BLD AUTO: 39.3 % (ref 37–54)
HGB BLD-MCNC: 12 G/DL (ref 12.5–16.5)
IMM GRANULOCYTES # BLD AUTO: 0.06 K/UL (ref 0–0.58)
IMM GRANULOCYTES NFR BLD: 1 % (ref 0–5)
LACTATE BLDV-SCNC: 1.8 MMOL/L (ref 0.5–2.2)
LYMPHOCYTES NFR BLD: 2.68 K/UL (ref 1.5–4)
LYMPHOCYTES RELATIVE PERCENT: 25 % (ref 20–42)
MAGNESIUM SERPL-MCNC: 2.8 MG/DL (ref 1.6–2.6)
MCH RBC QN AUTO: 31.4 PG (ref 26–35)
MCHC RBC AUTO-ENTMCNC: 30.5 G/DL (ref 32–34.5)
MCV RBC AUTO: 102.9 FL (ref 80–99.9)
MONOCYTES NFR BLD: 0.93 K/UL (ref 0.1–0.95)
MONOCYTES NFR BLD: 9 % (ref 2–12)
NEUTROPHILS NFR BLD: 63 % (ref 43–80)
NEUTS SEG NFR BLD: 6.79 K/UL (ref 1.8–7.3)
PLATELET # BLD AUTO: 182 K/UL (ref 130–450)
PMV BLD AUTO: 10.9 FL (ref 7–12)
POTASSIUM SERPL-SCNC: 5 MMOL/L (ref 3.5–5)
PROT SERPL-MCNC: 7.2 G/DL (ref 6.4–8.3)
RBC # BLD AUTO: 3.82 M/UL (ref 3.8–5.8)
SODIUM SERPL-SCNC: 137 MMOL/L (ref 132–146)
TROPONIN I SERPL HS-MCNC: 144 NG/L (ref 0–11)
TROPONIN I SERPL HS-MCNC: 159 NG/L (ref 0–11)
WBC OTHER # BLD: 10.7 K/UL (ref 4.5–11.5)

## 2024-11-20 PROCEDURE — 93005 ELECTROCARDIOGRAM TRACING: CPT | Performed by: EMERGENCY MEDICINE

## 2024-11-20 PROCEDURE — 71045 X-RAY EXAM CHEST 1 VIEW: CPT

## 2024-11-20 PROCEDURE — 2580000003 HC RX 258: Performed by: FAMILY MEDICINE

## 2024-11-20 PROCEDURE — 83735 ASSAY OF MAGNESIUM: CPT

## 2024-11-20 PROCEDURE — 2580000003 HC RX 258: Performed by: EMERGENCY MEDICINE

## 2024-11-20 PROCEDURE — 85025 COMPLETE CBC W/AUTO DIFF WBC: CPT

## 2024-11-20 PROCEDURE — 2140000000 HC CCU INTERMEDIATE R&B

## 2024-11-20 PROCEDURE — 99285 EMERGENCY DEPT VISIT HI MDM: CPT

## 2024-11-20 PROCEDURE — 6370000000 HC RX 637 (ALT 250 FOR IP): Performed by: EMERGENCY MEDICINE

## 2024-11-20 PROCEDURE — 83880 ASSAY OF NATRIURETIC PEPTIDE: CPT

## 2024-11-20 PROCEDURE — 84484 ASSAY OF TROPONIN QUANT: CPT

## 2024-11-20 PROCEDURE — 80053 COMPREHEN METABOLIC PANEL: CPT

## 2024-11-20 PROCEDURE — 83605 ASSAY OF LACTIC ACID: CPT

## 2024-11-20 RX ORDER — MIDODRINE HYDROCHLORIDE 5 MG/1
5 TABLET ORAL ONCE
Status: COMPLETED | OUTPATIENT
Start: 2024-11-20 | End: 2024-11-20

## 2024-11-20 RX ORDER — SODIUM CHLORIDE 0.9 % (FLUSH) 0.9 %
5-40 SYRINGE (ML) INJECTION PRN
Status: DISCONTINUED | OUTPATIENT
Start: 2024-11-20 | End: 2024-11-22 | Stop reason: SDUPTHER

## 2024-11-20 RX ORDER — ONDANSETRON 4 MG/1
4 TABLET, ORALLY DISINTEGRATING ORAL EVERY 8 HOURS PRN
Status: DISCONTINUED | OUTPATIENT
Start: 2024-11-20 | End: 2024-12-06 | Stop reason: HOSPADM

## 2024-11-20 RX ORDER — ACETAMINOPHEN 650 MG/1
650 SUPPOSITORY RECTAL EVERY 6 HOURS PRN
Status: DISCONTINUED | OUTPATIENT
Start: 2024-11-20 | End: 2024-12-06 | Stop reason: HOSPADM

## 2024-11-20 RX ORDER — POLYETHYLENE GLYCOL 3350 17 G/17G
17 POWDER, FOR SOLUTION ORAL DAILY PRN
Status: DISCONTINUED | OUTPATIENT
Start: 2024-11-20 | End: 2024-12-06 | Stop reason: HOSPADM

## 2024-11-20 RX ORDER — SODIUM CHLORIDE 9 MG/ML
INJECTION, SOLUTION INTRAVENOUS PRN
Status: DISCONTINUED | OUTPATIENT
Start: 2024-11-20 | End: 2024-11-22 | Stop reason: SDUPTHER

## 2024-11-20 RX ORDER — MIDODRINE HYDROCHLORIDE 5 MG/1
5 TABLET ORAL
Status: DISCONTINUED | OUTPATIENT
Start: 2024-11-21 | End: 2024-11-21

## 2024-11-20 RX ORDER — 0.9 % SODIUM CHLORIDE 0.9 %
500 INTRAVENOUS SOLUTION INTRAVENOUS ONCE
Status: COMPLETED | OUTPATIENT
Start: 2024-11-20 | End: 2024-11-21

## 2024-11-20 RX ORDER — ONDANSETRON 2 MG/ML
4 INJECTION INTRAMUSCULAR; INTRAVENOUS EVERY 6 HOURS PRN
Status: DISCONTINUED | OUTPATIENT
Start: 2024-11-20 | End: 2024-12-06 | Stop reason: HOSPADM

## 2024-11-20 RX ORDER — 0.9 % SODIUM CHLORIDE 0.9 %
1000 INTRAVENOUS SOLUTION INTRAVENOUS ONCE
Status: COMPLETED | OUTPATIENT
Start: 2024-11-20 | End: 2024-11-20

## 2024-11-20 RX ORDER — SODIUM CHLORIDE 0.9 % (FLUSH) 0.9 %
5-40 SYRINGE (ML) INJECTION EVERY 12 HOURS SCHEDULED
Status: DISCONTINUED | OUTPATIENT
Start: 2024-11-20 | End: 2024-11-22 | Stop reason: SDUPTHER

## 2024-11-20 RX ORDER — ACETAMINOPHEN 325 MG/1
650 TABLET ORAL EVERY 6 HOURS PRN
Status: DISCONTINUED | OUTPATIENT
Start: 2024-11-20 | End: 2024-12-06 | Stop reason: HOSPADM

## 2024-11-20 RX ADMIN — MIDODRINE HYDROCHLORIDE 5 MG: 5 TABLET ORAL at 22:04

## 2024-11-20 RX ADMIN — SODIUM CHLORIDE 500 ML: 9 INJECTION, SOLUTION INTRAVENOUS at 22:50

## 2024-11-20 RX ADMIN — MIDODRINE HYDROCHLORIDE 5 MG: 5 TABLET ORAL at 18:58

## 2024-11-20 RX ADMIN — SODIUM CHLORIDE 1000 ML: 9 INJECTION, SOLUTION INTRAVENOUS at 17:43

## 2024-11-20 ASSESSMENT — PAIN - FUNCTIONAL ASSESSMENT: PAIN_FUNCTIONAL_ASSESSMENT: NONE - DENIES PAIN

## 2024-11-20 ASSESSMENT — LIFESTYLE VARIABLES
HOW MANY STANDARD DRINKS CONTAINING ALCOHOL DO YOU HAVE ON A TYPICAL DAY: PATIENT DOES NOT DRINK
HOW OFTEN DO YOU HAVE A DRINK CONTAINING ALCOHOL: NEVER

## 2024-11-21 ENCOUNTER — APPOINTMENT (OUTPATIENT)
Age: 79
DRG: 242 | End: 2024-11-21
Payer: MEDICARE

## 2024-11-21 PROBLEM — I49.5 SINUS NODE DYSFUNCTION (HCC): Status: ACTIVE | Noted: 2024-11-21

## 2024-11-21 LAB
ANION GAP SERPL CALCULATED.3IONS-SCNC: 17 MMOL/L (ref 7–16)
BASOPHILS # BLD: 0.07 K/UL (ref 0–0.2)
BASOPHILS NFR BLD: 1 % (ref 0–2)
BUN SERPL-MCNC: 57 MG/DL (ref 6–23)
CALCIUM SERPL-MCNC: 8.6 MG/DL (ref 8.6–10.2)
CHLORIDE SERPL-SCNC: 101 MMOL/L (ref 98–107)
CO2 SERPL-SCNC: 21 MMOL/L (ref 22–29)
CREAT SERPL-MCNC: 9.7 MG/DL (ref 0.7–1.2)
ECHO AV AREA PEAK VELOCITY: 1.8 CM2
ECHO AV AREA VTI: 1.8 CM2
ECHO AV AREA/BSA PEAK VELOCITY: 1 CM2/M2
ECHO AV AREA/BSA VTI: 1 CM2/M2
ECHO AV MEAN GRADIENT: 12 MMHG
ECHO AV MEAN VELOCITY: 1.7 M/S
ECHO AV PEAK GRADIENT: 23 MMHG
ECHO AV PEAK VELOCITY: 2.4 M/S
ECHO AV VELOCITY RATIO: 0.67
ECHO AV VTI: 53.8 CM
ECHO BSA: 1.85 M2
ECHO EST RA PRESSURE: 3 MMHG
ECHO LA VOL A-L A2C: 57 ML (ref 18–58)
ECHO LA VOL A-L A4C: 75 ML (ref 18–58)
ECHO LA VOL MOD A2C: 53 ML (ref 18–58)
ECHO LA VOL MOD A4C: 70 ML (ref 18–58)
ECHO LA VOLUME AREA LENGTH: 68 ML
ECHO LA VOLUME INDEX A-L A2C: 31 ML/M2 (ref 16–34)
ECHO LA VOLUME INDEX A-L A4C: 40 ML/M2 (ref 16–34)
ECHO LA VOLUME INDEX AREA LENGTH: 37 ML/M2 (ref 16–34)
ECHO LA VOLUME INDEX MOD A2C: 28 ML/M2 (ref 16–34)
ECHO LA VOLUME INDEX MOD A4C: 38 ML/M2 (ref 16–34)
ECHO LV EDV A2C: 53 ML
ECHO LV EDV A4C: 60 ML
ECHO LV EDV BP: 57 ML (ref 67–155)
ECHO LV EDV INDEX A4C: 32 ML/M2
ECHO LV EDV INDEX BP: 31 ML/M2
ECHO LV EDV NDEX A2C: 28 ML/M2
ECHO LV EJECTION FRACTION A2C: 74 %
ECHO LV EJECTION FRACTION A4C: 77 %
ECHO LV EJECTION FRACTION BIPLANE: 75 % (ref 55–100)
ECHO LV ESV A2C: 13 ML
ECHO LV ESV A4C: 14 ML
ECHO LV ESV BP: 14 ML (ref 22–58)
ECHO LV ESV INDEX A2C: 7 ML/M2
ECHO LV ESV INDEX A4C: 8 ML/M2
ECHO LV ESV INDEX BP: 8 ML/M2
ECHO LVOT AREA: 2.8 CM2
ECHO LVOT AV VTI INDEX: 0.66
ECHO LVOT DIAM: 1.9 CM
ECHO LVOT MEAN GRADIENT: 4 MMHG
ECHO LVOT PEAK GRADIENT: 10 MMHG
ECHO LVOT PEAK VELOCITY: 1.6 M/S
ECHO LVOT STROKE VOLUME INDEX: 53.9 ML/M2
ECHO LVOT SV: 100.3 ML
ECHO LVOT VTI: 35.4 CM
ECHO MV "A" WAVE DURATION: 133.8 MSEC
ECHO MV A VELOCITY: 0.94 M/S
ECHO MV AREA PHT: 3.1 CM2
ECHO MV AREA VTI: 1.6 CM2
ECHO MV E DECELERATION TIME (DT): 226.2 MS
ECHO MV E VELOCITY: 1.86 M/S
ECHO MV E/A RATIO: 1.98
ECHO MV LVOT VTI INDEX: 1.73
ECHO MV MAX VELOCITY: 2.1 M/S
ECHO MV MEAN GRADIENT: 4 MMHG
ECHO MV MEAN VELOCITY: 0.8 M/S
ECHO MV PEAK GRADIENT: 17 MMHG
ECHO MV PRESSURE HALF TIME (PHT): 70.6 MS
ECHO MV VTI: 61.1 CM
ECHO RIGHT VENTRICULAR SYSTOLIC PRESSURE (RVSP): 55 MMHG
ECHO RV TAPSE: 1.6 CM (ref 1.7–?)
ECHO TV REGURGITANT MAX VELOCITY: 3.6 M/S
ECHO TV REGURGITANT PEAK GRADIENT: 57 MMHG
EKG ATRIAL RATE: 43 BPM
EKG P AXIS: 78 DEGREES
EKG P-R INTERVAL: 220 MS
EKG Q-T INTERVAL: 534 MS
EKG QRS DURATION: 60 MS
EKG QTC CALCULATION (BAZETT): 451 MS
EKG R AXIS: 35 DEGREES
EKG T AXIS: 62 DEGREES
EKG VENTRICULAR RATE: 43 BPM
EOSINOPHIL # BLD: 0.21 K/UL (ref 0.05–0.5)
EOSINOPHILS RELATIVE PERCENT: 2 % (ref 0–6)
ERYTHROCYTE [DISTWIDTH] IN BLOOD BY AUTOMATED COUNT: 15.3 % (ref 11.5–15)
GFR, ESTIMATED: 5 ML/MIN/1.73M2
GLUCOSE BLD-MCNC: 59 MG/DL (ref 74–99)
GLUCOSE SERPL-MCNC: 77 MG/DL (ref 74–99)
HAV IGM SERPL QL IA: NONREACTIVE
HBV CORE IGM SERPL QL IA: NONREACTIVE
HBV SURFACE AB SERPL IA-ACNC: <3.1 MIU/ML (ref 0–9.99)
HBV SURFACE AG SERPL QL IA: NONREACTIVE
HCT VFR BLD AUTO: 32.8 % (ref 37–54)
HCV AB SERPL QL IA: NONREACTIVE
HGB BLD-MCNC: 9.9 G/DL (ref 12.5–16.5)
IMM GRANULOCYTES # BLD AUTO: 0.05 K/UL (ref 0–0.58)
IMM GRANULOCYTES NFR BLD: 1 % (ref 0–5)
LACTATE BLDV-SCNC: 0.7 MMOL/L (ref 0.5–2.2)
LYMPHOCYTES NFR BLD: 2.7 K/UL (ref 1.5–4)
LYMPHOCYTES RELATIVE PERCENT: 30 % (ref 20–42)
MCH RBC QN AUTO: 31.7 PG (ref 26–35)
MCHC RBC AUTO-ENTMCNC: 30.2 G/DL (ref 32–34.5)
MCV RBC AUTO: 105.1 FL (ref 80–99.9)
MONOCYTES NFR BLD: 1.03 K/UL (ref 0.1–0.95)
MONOCYTES NFR BLD: 11 % (ref 2–12)
NEUTROPHILS NFR BLD: 55 % (ref 43–80)
NEUTS SEG NFR BLD: 5 K/UL (ref 1.8–7.3)
PLATELET # BLD AUTO: 147 K/UL (ref 130–450)
PMV BLD AUTO: 11 FL (ref 7–12)
POTASSIUM SERPL-SCNC: 5.3 MMOL/L (ref 3.5–5)
RBC # BLD AUTO: 3.12 M/UL (ref 3.8–5.8)
SODIUM SERPL-SCNC: 139 MMOL/L (ref 132–146)
TSH SERPL DL<=0.05 MIU/L-ACNC: 2.82 UIU/ML (ref 0.27–4.2)
WBC OTHER # BLD: 9.1 K/UL (ref 4.5–11.5)

## 2024-11-21 PROCEDURE — 36556 INSERT NON-TUNNEL CV CATH: CPT

## 2024-11-21 PROCEDURE — 86317 IMMUNOASSAY INFECTIOUS AGENT: CPT

## 2024-11-21 PROCEDURE — 85025 COMPLETE CBC W/AUTO DIFF WBC: CPT

## 2024-11-21 PROCEDURE — 36415 COLL VENOUS BLD VENIPUNCTURE: CPT

## 2024-11-21 PROCEDURE — 6370000000 HC RX 637 (ALT 250 FOR IP): Performed by: INTERNAL MEDICINE

## 2024-11-21 PROCEDURE — 6370000000 HC RX 637 (ALT 250 FOR IP): Performed by: FAMILY MEDICINE

## 2024-11-21 PROCEDURE — 2580000003 HC RX 258: Performed by: FAMILY MEDICINE

## 2024-11-21 PROCEDURE — 6370000000 HC RX 637 (ALT 250 FOR IP)

## 2024-11-21 PROCEDURE — 2000000000 HC ICU R&B

## 2024-11-21 PROCEDURE — 87040 BLOOD CULTURE FOR BACTERIA: CPT

## 2024-11-21 PROCEDURE — 6360000002 HC RX W HCPCS: Performed by: STUDENT IN AN ORGANIZED HEALTH CARE EDUCATION/TRAINING PROGRAM

## 2024-11-21 PROCEDURE — 80048 BASIC METABOLIC PNL TOTAL CA: CPT

## 2024-11-21 PROCEDURE — 3E033XZ INTRODUCTION OF VASOPRESSOR INTO PERIPHERAL VEIN, PERCUTANEOUS APPROACH: ICD-10-PCS | Performed by: FAMILY MEDICINE

## 2024-11-21 PROCEDURE — 5A1D70Z PERFORMANCE OF URINARY FILTRATION, INTERMITTENT, LESS THAN 6 HOURS PER DAY: ICD-10-PCS | Performed by: FAMILY MEDICINE

## 2024-11-21 PROCEDURE — 87081 CULTURE SCREEN ONLY: CPT

## 2024-11-21 PROCEDURE — 93306 TTE W/DOPPLER COMPLETE: CPT

## 2024-11-21 PROCEDURE — 93010 ELECTROCARDIOGRAM REPORT: CPT | Performed by: INTERNAL MEDICINE

## 2024-11-21 PROCEDURE — 93005 ELECTROCARDIOGRAM TRACING: CPT | Performed by: NURSE PRACTITIONER

## 2024-11-21 PROCEDURE — 84443 ASSAY THYROID STIM HORMONE: CPT

## 2024-11-21 PROCEDURE — 86618 LYME DISEASE ANTIBODY: CPT

## 2024-11-21 PROCEDURE — 06HY33Z INSERTION OF INFUSION DEVICE INTO LOWER VEIN, PERCUTANEOUS APPROACH: ICD-10-PCS | Performed by: FAMILY MEDICINE

## 2024-11-21 PROCEDURE — 6360000002 HC RX W HCPCS: Performed by: INTERNAL MEDICINE

## 2024-11-21 PROCEDURE — 2580000003 HC RX 258: Performed by: INTERNAL MEDICINE

## 2024-11-21 PROCEDURE — 99291 CRITICAL CARE FIRST HOUR: CPT | Performed by: STUDENT IN AN ORGANIZED HEALTH CARE EDUCATION/TRAINING PROGRAM

## 2024-11-21 PROCEDURE — 6360000002 HC RX W HCPCS

## 2024-11-21 PROCEDURE — 80074 ACUTE HEPATITIS PANEL: CPT

## 2024-11-21 PROCEDURE — 99222 1ST HOSP IP/OBS MODERATE 55: CPT | Performed by: FAMILY MEDICINE

## 2024-11-21 PROCEDURE — 36556 INSERT NON-TUNNEL CV CATH: CPT | Performed by: INTERNAL MEDICINE

## 2024-11-21 PROCEDURE — 82947 ASSAY GLUCOSE BLOOD QUANT: CPT

## 2024-11-21 PROCEDURE — 93306 TTE W/DOPPLER COMPLETE: CPT | Performed by: INTERNAL MEDICINE

## 2024-11-21 PROCEDURE — 99291 CRITICAL CARE FIRST HOUR: CPT | Performed by: INTERNAL MEDICINE

## 2024-11-21 PROCEDURE — 83605 ASSAY OF LACTIC ACID: CPT

## 2024-11-21 PROCEDURE — 90935 HEMODIALYSIS ONE EVALUATION: CPT | Performed by: INTERNAL MEDICINE

## 2024-11-21 RX ORDER — MIDAZOLAM HYDROCHLORIDE 2 MG/2ML
1 INJECTION, SOLUTION INTRAMUSCULAR; INTRAVENOUS ONCE
Status: COMPLETED | OUTPATIENT
Start: 2024-11-21 | End: 2024-11-21

## 2024-11-21 RX ORDER — FENTANYL CITRATE 50 UG/ML
50 INJECTION, SOLUTION INTRAMUSCULAR; INTRAVENOUS ONCE
Status: COMPLETED | OUTPATIENT
Start: 2024-11-21 | End: 2024-11-21

## 2024-11-21 RX ORDER — ALLOPURINOL 100 MG/1
100 TABLET ORAL DAILY
Status: DISCONTINUED | OUTPATIENT
Start: 2024-11-21 | End: 2024-12-06 | Stop reason: HOSPADM

## 2024-11-21 RX ORDER — MIDAZOLAM HYDROCHLORIDE 1 MG/ML
INJECTION, SOLUTION INTRAMUSCULAR; INTRAVENOUS
Status: COMPLETED
Start: 2024-11-21 | End: 2024-11-21

## 2024-11-21 RX ORDER — HALOPERIDOL 5 MG/ML
2 INJECTION INTRAMUSCULAR ONCE
Status: COMPLETED | OUTPATIENT
Start: 2024-11-21 | End: 2024-11-21

## 2024-11-21 RX ORDER — DOPAMINE HYDROCHLORIDE 160 MG/100ML
1-20 INJECTION, SOLUTION INTRAVENOUS CONTINUOUS
Status: DISCONTINUED | OUTPATIENT
Start: 2024-11-21 | End: 2024-11-23

## 2024-11-21 RX ORDER — DEXTROSE MONOHYDRATE AND SODIUM CHLORIDE 5; .9 G/100ML; G/100ML
INJECTION, SOLUTION INTRAVENOUS CONTINUOUS
Status: ACTIVE | OUTPATIENT
Start: 2024-11-21 | End: 2024-11-23

## 2024-11-21 RX ORDER — GLUCAGON 1 MG/ML
1 KIT INJECTION PRN
Status: DISCONTINUED | OUTPATIENT
Start: 2024-11-21 | End: 2024-12-06 | Stop reason: HOSPADM

## 2024-11-21 RX ORDER — DEXTROSE MONOHYDRATE 25 G/50ML
25 INJECTION, SOLUTION INTRAVENOUS PRN
Status: DISCONTINUED | OUTPATIENT
Start: 2024-11-21 | End: 2024-12-06 | Stop reason: HOSPADM

## 2024-11-21 RX ORDER — ALBUMIN (HUMAN) 12.5 G/50ML
25 SOLUTION INTRAVENOUS PRN
Status: DISCONTINUED | OUTPATIENT
Start: 2024-11-21 | End: 2024-12-06 | Stop reason: HOSPADM

## 2024-11-21 RX ORDER — HALOPERIDOL 5 MG/ML
INJECTION INTRAMUSCULAR
Status: COMPLETED
Start: 2024-11-21 | End: 2024-11-21

## 2024-11-21 RX ORDER — DEXTROSE MONOHYDRATE 100 MG/ML
INJECTION, SOLUTION INTRAVENOUS CONTINUOUS PRN
Status: DISCONTINUED | OUTPATIENT
Start: 2024-11-21 | End: 2024-12-06 | Stop reason: HOSPADM

## 2024-11-21 RX ORDER — LACTOBACILLUS RHAMNOSUS GG 10B CELL
1 CAPSULE ORAL DAILY
Status: DISCONTINUED | OUTPATIENT
Start: 2024-11-21 | End: 2024-12-06 | Stop reason: HOSPADM

## 2024-11-21 RX ORDER — ROSUVASTATIN CALCIUM 10 MG/1
10 TABLET, COATED ORAL NIGHTLY
Status: DISCONTINUED | OUTPATIENT
Start: 2024-11-21 | End: 2024-12-06 | Stop reason: HOSPADM

## 2024-11-21 RX ORDER — ATROPINE SULFATE 0.1 MG/ML
1 INJECTION INTRAVENOUS ONCE
Status: DISCONTINUED | OUTPATIENT
Start: 2024-11-21 | End: 2024-12-06 | Stop reason: HOSPADM

## 2024-11-21 RX ADMIN — SODIUM CHLORIDE, PRESERVATIVE FREE 10 ML: 5 INJECTION INTRAVENOUS at 20:15

## 2024-11-21 RX ADMIN — MIDODRINE HYDROCHLORIDE 5 MG: 5 TABLET ORAL at 12:30

## 2024-11-21 RX ADMIN — AZITHROMYCIN DIHYDRATE 500 MG: 500 INJECTION, POWDER, LYOPHILIZED, FOR SOLUTION INTRAVENOUS at 14:39

## 2024-11-21 RX ADMIN — MIDAZOLAM 1 MG: 1 INJECTION INTRAMUSCULAR; INTRAVENOUS at 13:17

## 2024-11-21 RX ADMIN — MIDAZOLAM HYDROCHLORIDE 1 MG: 2 INJECTION, SOLUTION INTRAMUSCULAR; INTRAVENOUS at 13:17

## 2024-11-21 RX ADMIN — MIDAZOLAM 1 MG: 1 INJECTION INTRAMUSCULAR; INTRAVENOUS at 17:26

## 2024-11-21 RX ADMIN — Medication 1 CAPSULE: at 07:51

## 2024-11-21 RX ADMIN — DEXTROSE AND SODIUM CHLORIDE: 5; 900 INJECTION, SOLUTION INTRAVENOUS at 13:24

## 2024-11-21 RX ADMIN — MIDODRINE HYDROCHLORIDE 5 MG: 5 TABLET ORAL at 07:51

## 2024-11-21 RX ADMIN — ALLOPURINOL 100 MG: 100 TABLET ORAL at 07:51

## 2024-11-21 RX ADMIN — ROSUVASTATIN CALCIUM 10 MG: 5 TABLET, FILM COATED ORAL at 20:15

## 2024-11-21 RX ADMIN — SODIUM CHLORIDE, PRESERVATIVE FREE 10 ML: 5 INJECTION INTRAVENOUS at 07:51

## 2024-11-21 RX ADMIN — FENTANYL CITRATE 50 MCG: 50 INJECTION INTRAMUSCULAR; INTRAVENOUS at 12:54

## 2024-11-21 RX ADMIN — WATER 1000 MG: 1 INJECTION INTRAMUSCULAR; INTRAVENOUS; SUBCUTANEOUS at 14:37

## 2024-11-21 RX ADMIN — SODIUM ZIRCONIUM CYCLOSILICATE 10 G: 10 POWDER, FOR SUSPENSION ORAL at 10:08

## 2024-11-21 RX ADMIN — HALOPERIDOL LACTATE 2 MG: 5 INJECTION, SOLUTION INTRAMUSCULAR at 16:41

## 2024-11-21 RX ADMIN — MIDAZOLAM HYDROCHLORIDE 1 MG: 2 INJECTION, SOLUTION INTRAMUSCULAR; INTRAVENOUS at 17:26

## 2024-11-21 RX ADMIN — MIDAZOLAM 1 MG: 1 INJECTION INTRAMUSCULAR; INTRAVENOUS at 22:24

## 2024-11-21 RX ADMIN — DOPAMINE HYDROCHLORIDE 5 MCG/KG/MIN: 160 INJECTION, SOLUTION INTRAVENOUS at 12:29

## 2024-11-21 RX ADMIN — HALOPERIDOL 2 MG: 5 INJECTION INTRAMUSCULAR at 16:41

## 2024-11-21 RX ADMIN — Medication 16 G: at 11:08

## 2024-11-21 ASSESSMENT — PAIN SCALES - GENERAL: PAINLEVEL_OUTOF10: 0

## 2024-11-21 NOTE — H&P
Hospital Medicine History & Physical      PCP: Rafal Weber MD    Date of Admission: 11/20/2024    Date of Service: Pt seen/examined on 11/21/2024 and Admitted to Inpatient with expected LOS greater than two midnights due to medical therapy.      Chief Complaint: Bradycardia, hypotension      History Of Present Illness:    79-year-old male past medical history of CKD, DVT on Eliquis, abdominal aortic aneurysm presented due to hypotension and bradycardia during fistulogram.  He was found to be bradycardic in the 30s and also hypotensive in the 80s brought to the ER.  At baseline he does take midodrine for hypotension and per wife he usually has bradycardia with heart rates in the low 50s and mid 40s.  Nephrology did speak to EP recommended admission for possible pacemaker placement.  Patient was given IV fluids normal saline 1 L also 10 mg of midodrine in the ER.  EKG with fifth degree AV block low voltage QTc 451.  Wife at bedside mention his heart rate does run low in the mid 50s and low 60s.  But today it was in the 30s that was very concerning.  Patient denies any chest pain.      Past Medical History:          Diagnosis Date    Aneurysm of abdominal aorta (HCC) 9/27/2012    Deep vein thrombosis of calf ([facility])     R calf vein thrombosis involving post tibial vein with extension in close proximity  to popliteal vein    Gout     History of DVT (deep vein thrombosis) 3/12/2015    History of tobacco use 7/27/2017    Hypertension     Iliac artery aneurysm, bilateral ([facility]) 6/29/2017    Loss of weight     diet and exercise    Right leg DVT ([facility]) 3/12/2015       Past Surgical History:          Procedure Laterality Date    ABDOMINAL AORTIC ANEURYSM REPAIR      2018    DIALYSIS FISTULA CREATION Left 11/17/2023    creation AVF left arm [place] [facility] [place] Gutierrez [place] [facility]    EP DEVICE PROCEDURE N/A 01/26/2024    [employer] LINQ insert performed by Johnson Gonsalves MD at [facility]    HERNIA

## 2024-11-21 NOTE — ED PROVIDER NOTES
Department of Emergency Medicine   ED  Provider Note  Admit Date/RoomTime: 2024  5:16 PM  ED Room: [facility]A        Written by: Cristin Abbasi DO  Patient Name: Cleveland Thompson  Attending Provider: Winifred Mcghee DO  Admit Date: 2024  5:16 PM  MRN: [id number]    : 1945      History of Present Illness:  24, Time: 8:56 PM EST  Chief Complaint   Patient presents with    hypotensive    Bradycardia     Patient send in post angioplasty for hypotension and bradycardia. BP 82/39 HR 49           HPI   Patient is a 79 y.o. male with a past medical history of abdominal aorta aneurysm, DVT, bradycardia, presenting to the Emergency Department for hypertension and bradycardia. History obtained by wife and nephrology.  Patient went to have a fistulogram today.  During the fistulogram was bradycardic and hypotensive.  Nephrology sent him in for further evaluation.  He does follow with EP.  Wife states he is bradycardic at baseline in the 40s sometimes 50s.  She does note he is also hypotensive and takes midodrine at home.  She states patient is acting himself and has no symptoms.  He is disoriented and agitated at baseline.  Nephrology spoke with EP and they sent the patient in for probable pacemaker.  Patient denies any chest pain, shortness of breath, numbness, tingling or weakness.  He is his baseline per wife        Review of Systems:   A complete review of systems was performed and pertinent positives and negatives are stated within HPI, all other systems reviewed and are negative.        --------------------------------------------- PAST HISTORY ---------------------------------------------  Past Medical History:  has a past medical history of Aneurysm of abdominal aorta (HCC), Deep vein thrombosis of calf (HCC), Gout, History of DVT (deep vein thrombosis), History of tobacco use, Hypertension, Iliac artery aneurysm, bilateral (HCC), Loss of weight, and Right leg DVT (HCC).    Past

## 2024-11-21 NOTE — CARE COORDINATION
11/21:  Transition of care:  Pt presented to the the hospital for a fistulogram  & was found to by bradycardia & hypotension from home.  PT is on room air at 100%, Iv Fluids, Iv Rocephin & Po Eliquis - old medication.  Pt is ESRD & is active with [facility].  CM spoke with pt's wife via the phone since pt was transferred to MICU to discuss CM role & dc planning.  Pt's PCP is Dr Rafal Weber & use [facility] in [place].  Pt lives in a ranch house with 3 steps to enter.  PTA pt was independent with a walker & +1 person assist.  Pt has private pay care givers from 11pm-9am.  Pt was active with [facility].  [facility] & she would be open to using them again if needed.  Cm sent referral to [facility].  Will need to verify dc plan & transport.  /CM will continue to follow.  Electronically signed by Tiana Gonzalez RN on 11/21/2024 at 2:25 PM     Case Management Assessment  Initial Evaluation    Date/Time of Evaluation: 11/21/2024 2:29 PM  Assessment Completed by: Tiana Gonzalez RN    If patient is discharged prior to next notation, then this note serves as note for discharge by case management.    Patient Name: Cleveland Thompson                   YOB: 1945  Diagnosis: Bradycardia [R00.1]                   Date / Time: 11/20/2024  5:16 PM    Patient Admission Status: Inpatient   Readmission Risk (Low < 19, Mod (19-27), High > 27): Readmission Risk Score: 21.2    Current PCP: Rafal Weber MD  PCP verified by CM? Yes    Chart Reviewed: Yes      History Provided by: Spouse  Patient Orientation: Alert and Oriented, Person    Patient Cognition: Dementia / Early Alzheimer's    Hospitalization in the last 30 days (Readmission):  Yes    If yes, Readmission Assessment in  Navigator will be completed.    Advance Directives:      Code Status: Full Code   Patient's Primary Decision Maker is:      Primary Decision Maker: DonnaJune - Spouse - [phone]    Discharge

## 2024-11-21 NOTE — CARE COORDINATION
Advance Care Planning   Healthcare Decision Maker:    Primary Decision Maker: Kathleen Thompson - Spouse - [phone]    Click here to complete Healthcare Decision Makers including selection of the Healthcare Decision Maker Relationship (ie \"Primary\").  Today we documented Decision Maker(s) consistent with Legal Next of Kin hierarchy.       Electronically signed by Tiana Gonzalez RN on 11/21/2024 at 2:33 PM

## 2024-11-21 NOTE — PROCEDURES
PROCEDURE NOTE  Date: 11/21/2024   Name: Cleveland Thompson  YOB: 1945    Procedures    Central Venous Catheterization Procedure Note    Indication:  Infusion(s) with high risk of extravasation injury                Time Out:  Completed immediately prior to the start of the procedure which included verification of the correct patient, correct site and agreement on the procedure to be done.    Consent:  The indications, risks, benefits, alternatives to the procedure were explained to the patient/surrogate decision maker and their questions answered. Consent was obtained to proceed with the procedure.    Type of Central Line Being Placed:   Central Venous Catheter    Location:   Right Femoral Vein    Central Line Bundle:  I washed/disinfected my hands prior to starting the procedure  Proceduralist(s) wore the following PPE throughout the procedure: hat, mask, sterile gown, & sterile gloves  Everyone in the room wore a mask during the procedure  A full body drape was used to cover the patient  Chlorhexidine was utilized to prep the site and allowed to dry completely    Ultrasound Guidance:   Yes    Anesthetic Used:  Lidocaine    Sterile Technique Maintained Throughout Procedure:  Yes    Description/Findings:   Dark, non-pulsatile blood return obtained from all lumens    Estimated Blood Loss:  < 5ml    Complications:  No apparent complications    Follow-up Chest X-ray:  Not applicable    Assistant(s):  Not applicable    Supervision:  No supervision required    Electronically signed by Gabriele Scott MD on 11/21/2024 at 1:56 PM

## 2024-11-22 ENCOUNTER — APPOINTMENT (OUTPATIENT)
Dept: GENERAL RADIOLOGY | Age: 79
DRG: 242 | End: 2024-11-22
Payer: MEDICARE

## 2024-11-22 ENCOUNTER — ANESTHESIA EVENT (OUTPATIENT)
Age: 79
End: 2024-11-22
Payer: MEDICARE

## 2024-11-22 ENCOUNTER — ANESTHESIA (OUTPATIENT)
Age: 79
End: 2024-11-22
Payer: MEDICARE

## 2024-11-22 LAB
ALBUMIN SERPL-MCNC: 3.6 G/DL (ref 3.5–5.2)
ALP SERPL-CCNC: 90 U/L (ref 40–129)
ALT SERPL-CCNC: 7 U/L (ref 0–40)
ANION GAP SERPL CALCULATED.3IONS-SCNC: 13 MMOL/L (ref 7–16)
AST SERPL-CCNC: 12 U/L (ref 0–39)
BILIRUB SERPL-MCNC: 0.3 MG/DL (ref 0–1.2)
BNP SERPL-MCNC: ABNORMAL PG/ML (ref 0–450)
BUN SERPL-MCNC: 29 MG/DL (ref 6–23)
CALCIUM SERPL-MCNC: 8.6 MG/DL (ref 8.6–10.2)
CHLORIDE SERPL-SCNC: 102 MMOL/L (ref 98–107)
CO2 SERPL-SCNC: 27 MMOL/L (ref 22–29)
CREAT SERPL-MCNC: 6.2 MG/DL (ref 0.7–1.2)
CRP SERPL HS-MCNC: 29 MG/L (ref 0–5)
ECHO BSA: 1.85 M2
EKG ATRIAL RATE: 36 BPM
EKG Q-T INTERVAL: 580 MS
EKG QRS DURATION: 66 MS
EKG QTC CALCULATION (BAZETT): 448 MS
EKG R AXIS: 80 DEGREES
EKG T AXIS: 61 DEGREES
EKG VENTRICULAR RATE: 36 BPM
ERYTHROCYTE [DISTWIDTH] IN BLOOD BY AUTOMATED COUNT: 14.9 % (ref 11.5–15)
GFR, ESTIMATED: 8 ML/MIN/1.73M2
GLUCOSE SERPL-MCNC: 114 MG/DL (ref 74–99)
HCT VFR BLD AUTO: 29.1 % (ref 37–54)
HGB BLD-MCNC: 9.2 G/DL (ref 12.5–16.5)
MAGNESIUM SERPL-MCNC: 2.3 MG/DL (ref 1.6–2.6)
MCH RBC QN AUTO: 31.8 PG (ref 26–35)
MCHC RBC AUTO-ENTMCNC: 31.6 G/DL (ref 32–34.5)
MCV RBC AUTO: 100.7 FL (ref 80–99.9)
PHOSPHATE SERPL-MCNC: 4.6 MG/DL (ref 2.5–4.5)
PLATELET # BLD AUTO: 128 K/UL (ref 130–450)
PMV BLD AUTO: 10.3 FL (ref 7–12)
POTASSIUM SERPL-SCNC: 4.2 MMOL/L (ref 3.5–5)
PROCALCITONIN SERPL-MCNC: 0.4 NG/ML (ref 0–0.08)
PROT SERPL-MCNC: 6.1 G/DL (ref 6.4–8.3)
RBC # BLD AUTO: 2.89 M/UL (ref 3.8–5.8)
SODIUM SERPL-SCNC: 142 MMOL/L (ref 132–146)
WBC OTHER # BLD: 9.9 K/UL (ref 4.5–11.5)

## 2024-11-22 PROCEDURE — 3700000000 HC ANESTHESIA ATTENDED CARE: Performed by: INTERNAL MEDICINE

## 2024-11-22 PROCEDURE — 6360000002 HC RX W HCPCS: Performed by: INTERNAL MEDICINE

## 2024-11-22 PROCEDURE — 2580000003 HC RX 258: Performed by: INTERNAL MEDICINE

## 2024-11-22 PROCEDURE — 33208 INSRT HEART PM ATRIAL & VENT: CPT | Performed by: INTERNAL MEDICINE

## 2024-11-22 PROCEDURE — 0JH606Z INSERTION OF PACEMAKER, DUAL CHAMBER INTO CHEST SUBCUTANEOUS TISSUE AND FASCIA, OPEN APPROACH: ICD-10-PCS | Performed by: FAMILY MEDICINE

## 2024-11-22 PROCEDURE — 6360000004 HC RX CONTRAST MEDICATION: Performed by: INTERNAL MEDICINE

## 2024-11-22 PROCEDURE — 6360000002 HC RX W HCPCS

## 2024-11-22 PROCEDURE — 6370000000 HC RX 637 (ALT 250 FOR IP): Performed by: INTERNAL MEDICINE

## 2024-11-22 PROCEDURE — C1889 IMPLANT/INSERT DEVICE, NOC: HCPCS | Performed by: INTERNAL MEDICINE

## 2024-11-22 PROCEDURE — 2580000003 HC RX 258: Performed by: NURSE ANESTHETIST, CERTIFIED REGISTERED

## 2024-11-22 PROCEDURE — 7100000011 HC PHASE II RECOVERY - ADDTL 15 MIN: Performed by: INTERNAL MEDICINE

## 2024-11-22 PROCEDURE — C1785 PMKR, DUAL, RATE-RESP: HCPCS | Performed by: INTERNAL MEDICINE

## 2024-11-22 PROCEDURE — 2720000010 HC SURG SUPPLY STERILE: Performed by: INTERNAL MEDICINE

## 2024-11-22 PROCEDURE — 7100000010 HC PHASE II RECOVERY - FIRST 15 MIN: Performed by: INTERNAL MEDICINE

## 2024-11-22 PROCEDURE — 2709999900 HC NON-CHARGEABLE SUPPLY: Performed by: INTERNAL MEDICINE

## 2024-11-22 PROCEDURE — C1894 INTRO/SHEATH, NON-LASER: HCPCS | Performed by: INTERNAL MEDICINE

## 2024-11-22 PROCEDURE — 83880 ASSAY OF NATRIURETIC PEPTIDE: CPT

## 2024-11-22 PROCEDURE — 3700000001 HC ADD 15 MINUTES (ANESTHESIA): Performed by: INTERNAL MEDICINE

## 2024-11-22 PROCEDURE — 3E0132A INTRODUCTION OF ANTI-INFECTIVE ENVELOPE INTO SUBCUTANEOUS TISSUE, PERCUTANEOUS APPROACH: ICD-10-PCS | Performed by: FAMILY MEDICINE

## 2024-11-22 PROCEDURE — 51798 US URINE CAPACITY MEASURE: CPT

## 2024-11-22 PROCEDURE — 6360000002 HC RX W HCPCS: Performed by: NURSE ANESTHETIST, CERTIFIED REGISTERED

## 2024-11-22 PROCEDURE — 80053 COMPREHEN METABOLIC PANEL: CPT

## 2024-11-22 PROCEDURE — 85027 COMPLETE CBC AUTOMATED: CPT

## 2024-11-22 PROCEDURE — 02HK3JZ INSERTION OF PACEMAKER LEAD INTO RIGHT VENTRICLE, PERCUTANEOUS APPROACH: ICD-10-PCS | Performed by: FAMILY MEDICINE

## 2024-11-22 PROCEDURE — C1769 GUIDE WIRE: HCPCS | Performed by: INTERNAL MEDICINE

## 2024-11-22 PROCEDURE — B5161ZZ FLUOROSCOPY OF RIGHT SUBCLAVIAN VEIN USING LOW OSMOLAR CONTRAST: ICD-10-PCS | Performed by: FAMILY MEDICINE

## 2024-11-22 PROCEDURE — 33286 RMVL SUBQ CAR RHYTHM MNTR: CPT | Performed by: INTERNAL MEDICINE

## 2024-11-22 PROCEDURE — 84145 PROCALCITONIN (PCT): CPT

## 2024-11-22 PROCEDURE — 02H63JZ INSERTION OF PACEMAKER LEAD INTO RIGHT ATRIUM, PERCUTANEOUS APPROACH: ICD-10-PCS | Performed by: FAMILY MEDICINE

## 2024-11-22 PROCEDURE — 87040 BLOOD CULTURE FOR BACTERIA: CPT

## 2024-11-22 PROCEDURE — C1892 INTRO/SHEATH,FIXED,PEEL-AWAY: HCPCS | Performed by: INTERNAL MEDICINE

## 2024-11-22 PROCEDURE — 86140 C-REACTIVE PROTEIN: CPT

## 2024-11-22 PROCEDURE — 99291 CRITICAL CARE FIRST HOUR: CPT | Performed by: INTERNAL MEDICINE

## 2024-11-22 PROCEDURE — 83735 ASSAY OF MAGNESIUM: CPT

## 2024-11-22 PROCEDURE — 84100 ASSAY OF PHOSPHORUS: CPT

## 2024-11-22 PROCEDURE — 93010 ELECTROCARDIOGRAM REPORT: CPT | Performed by: INTERNAL MEDICINE

## 2024-11-22 PROCEDURE — 71045 X-RAY EXAM CHEST 1 VIEW: CPT

## 2024-11-22 PROCEDURE — 6360000002 HC RX W HCPCS: Performed by: STUDENT IN AN ORGANIZED HEALTH CARE EDUCATION/TRAINING PROGRAM

## 2024-11-22 PROCEDURE — C1898 LEAD, PMKR, OTHER THAN TRANS: HCPCS | Performed by: INTERNAL MEDICINE

## 2024-11-22 PROCEDURE — 2060000000 HC ICU INTERMEDIATE R&B

## 2024-11-22 PROCEDURE — 99231 SBSQ HOSP IP/OBS SF/LOW 25: CPT | Performed by: INTERNAL MEDICINE

## 2024-11-22 DEVICE — PACING LEAD
Type: IMPLANTABLE DEVICE | Status: FUNCTIONAL
Brand: TENDRIL™

## 2024-11-22 DEVICE — ENVELOPE CMRM6122 ABSORB MED MR
Type: IMPLANTABLE DEVICE | Status: FUNCTIONAL
Brand: TYRX™

## 2024-11-22 DEVICE — DR PACEMAKER DDDR
Type: IMPLANTABLE DEVICE | Status: FUNCTIONAL
Brand: ASSURITY MRI™

## 2024-11-22 RX ORDER — ONDANSETRON 2 MG/ML
4 INJECTION INTRAMUSCULAR; INTRAVENOUS EVERY 6 HOURS PRN
Status: CANCELLED | OUTPATIENT
Start: 2024-11-22

## 2024-11-22 RX ORDER — SODIUM CHLORIDE 9 MG/ML
INJECTION, SOLUTION INTRAVENOUS
Status: DISCONTINUED | OUTPATIENT
Start: 2024-11-22 | End: 2024-11-22 | Stop reason: SDUPTHER

## 2024-11-22 RX ORDER — SODIUM CHLORIDE 0.9 % (FLUSH) 0.9 %
5-40 SYRINGE (ML) INJECTION EVERY 12 HOURS SCHEDULED
Status: DISCONTINUED | OUTPATIENT
Start: 2024-11-22 | End: 2024-11-29

## 2024-11-22 RX ORDER — MIDODRINE HYDROCHLORIDE 5 MG/1
5 TABLET ORAL
Status: DISCONTINUED | OUTPATIENT
Start: 2024-11-22 | End: 2024-12-06 | Stop reason: HOSPADM

## 2024-11-22 RX ORDER — MIDAZOLAM HYDROCHLORIDE 2 MG/2ML
1 INJECTION, SOLUTION INTRAMUSCULAR; INTRAVENOUS ONCE
Status: COMPLETED | OUTPATIENT
Start: 2024-11-22 | End: 2024-11-22

## 2024-11-22 RX ORDER — MIDAZOLAM HYDROCHLORIDE 1 MG/ML
INJECTION, SOLUTION INTRAMUSCULAR; INTRAVENOUS
Status: COMPLETED
Start: 2024-11-22 | End: 2024-11-22

## 2024-11-22 RX ORDER — MIRTAZAPINE 15 MG/1
7.5 TABLET, FILM COATED ORAL NIGHTLY
Status: DISCONTINUED | OUTPATIENT
Start: 2024-11-22 | End: 2024-12-06 | Stop reason: HOSPADM

## 2024-11-22 RX ORDER — IOPAMIDOL 755 MG/ML
INJECTION, SOLUTION INTRAVASCULAR PRN
Status: DISCONTINUED | OUTPATIENT
Start: 2024-11-22 | End: 2024-11-22 | Stop reason: HOSPADM

## 2024-11-22 RX ORDER — MIDAZOLAM HYDROCHLORIDE 1 MG/ML
INJECTION, SOLUTION INTRAMUSCULAR; INTRAVENOUS
Status: DISCONTINUED | OUTPATIENT
Start: 2024-11-22 | End: 2024-11-22 | Stop reason: SDUPTHER

## 2024-11-22 RX ORDER — VANCOMYCIN HYDROCHLORIDE 1 G/20ML
INJECTION, POWDER, LYOPHILIZED, FOR SOLUTION INTRAVENOUS
Status: DISCONTINUED | OUTPATIENT
Start: 2024-11-22 | End: 2024-11-22 | Stop reason: SDUPTHER

## 2024-11-22 RX ORDER — SODIUM CHLORIDE 9 MG/ML
INJECTION, SOLUTION INTRAVENOUS PRN
Status: DISCONTINUED | OUTPATIENT
Start: 2024-11-22 | End: 2024-12-06 | Stop reason: HOSPADM

## 2024-11-22 RX ORDER — PROPOFOL 10 MG/ML
INJECTION, EMULSION INTRAVENOUS
Status: DISCONTINUED | OUTPATIENT
Start: 2024-11-22 | End: 2024-11-22 | Stop reason: SDUPTHER

## 2024-11-22 RX ORDER — SODIUM CHLORIDE 0.9 % (FLUSH) 0.9 %
5-40 SYRINGE (ML) INJECTION PRN
Status: DISCONTINUED | OUTPATIENT
Start: 2024-11-22 | End: 2024-12-06 | Stop reason: HOSPADM

## 2024-11-22 RX ORDER — FENTANYL CITRATE 50 UG/ML
INJECTION, SOLUTION INTRAMUSCULAR; INTRAVENOUS
Status: DISCONTINUED | OUTPATIENT
Start: 2024-11-22 | End: 2024-11-22 | Stop reason: SDUPTHER

## 2024-11-22 RX ADMIN — MIDAZOLAM HYDROCHLORIDE 1 MG: 2 INJECTION, SOLUTION INTRAMUSCULAR; INTRAVENOUS at 13:22

## 2024-11-22 RX ADMIN — MIDAZOLAM 1 MG: 1 INJECTION INTRAMUSCULAR; INTRAVENOUS at 09:24

## 2024-11-22 RX ADMIN — MIDAZOLAM 1 MG: 1 INJECTION INTRAMUSCULAR; INTRAVENOUS at 19:24

## 2024-11-22 RX ADMIN — FENTANYL CITRATE 50 MCG: 50 INJECTION, SOLUTION INTRAMUSCULAR; INTRAVENOUS at 09:24

## 2024-11-22 RX ADMIN — SODIUM CHLORIDE, PRESERVATIVE FREE 10 ML: 5 INJECTION INTRAVENOUS at 20:30

## 2024-11-22 RX ADMIN — MIDAZOLAM 1 MG: 1 INJECTION INTRAMUSCULAR; INTRAVENOUS at 13:22

## 2024-11-22 RX ADMIN — SODIUM CHLORIDE: 9 INJECTION, SOLUTION INTRAVENOUS at 09:20

## 2024-11-22 RX ADMIN — SODIUM CHLORIDE, PRESERVATIVE FREE 10 ML: 5 INJECTION INTRAVENOUS at 20:29

## 2024-11-22 RX ADMIN — SODIUM CHLORIDE: 9 INJECTION, SOLUTION INTRAVENOUS at 09:15

## 2024-11-22 RX ADMIN — VANCOMYCIN HYDROCHLORIDE 1000 MG: 1 INJECTION, POWDER, LYOPHILIZED, FOR SOLUTION INTRAVENOUS at 09:20

## 2024-11-22 RX ADMIN — FENTANYL CITRATE 25 MCG: 50 INJECTION, SOLUTION INTRAMUSCULAR; INTRAVENOUS at 10:28

## 2024-11-22 RX ADMIN — PROPOFOL 25 MCG/KG/MIN: 10 INJECTION, EMULSION INTRAVENOUS at 09:20

## 2024-11-22 RX ADMIN — MIDAZOLAM 0.5 MG: 1 INJECTION INTRAMUSCULAR; INTRAVENOUS at 10:30

## 2024-11-22 RX ADMIN — WATER 1000 MG: 1 INJECTION INTRAMUSCULAR; INTRAVENOUS; SUBCUTANEOUS at 13:25

## 2024-11-22 RX ADMIN — MIDAZOLAM 0.5 MG: 1 INJECTION INTRAMUSCULAR; INTRAVENOUS at 10:56

## 2024-11-22 RX ADMIN — PROPOFOL 14 MG: 10 INJECTION, EMULSION INTRAVENOUS at 10:57

## 2024-11-22 RX ADMIN — DOPAMINE HYDROCHLORIDE 5 MCG/KG/MIN: 160 INJECTION, SOLUTION INTRAVENOUS at 07:30

## 2024-11-22 RX ADMIN — FENTANYL CITRATE 25 MCG: 50 INJECTION, SOLUTION INTRAMUSCULAR; INTRAVENOUS at 09:39

## 2024-11-22 RX ADMIN — PROPOFOL 14 MG: 10 INJECTION, EMULSION INTRAVENOUS at 10:29

## 2024-11-22 RX ADMIN — AZITHROMYCIN DIHYDRATE 500 MG: 500 INJECTION, POWDER, LYOPHILIZED, FOR SOLUTION INTRAVENOUS at 13:30

## 2024-11-22 RX ADMIN — DEXTROSE AND SODIUM CHLORIDE: 5; 900 INJECTION, SOLUTION INTRAVENOUS at 12:17

## 2024-11-22 RX ADMIN — SODIUM CHLORIDE: 9 INJECTION, SOLUTION INTRAVENOUS at 09:22

## 2024-11-22 ASSESSMENT — PAIN SCALES - GENERAL: PAINLEVEL_OUTOF10: 0

## 2024-11-22 NOTE — FLOWSHEET NOTE
11/21/24 1935   Vital Signs   BP (!) 96/51   Temp 97 °F (36.1 °C)   Pulse 72   Respirations 21   SpO2 99 %   Post-Hemodialysis Assessment   Post-Treatment Procedures Blood returned;Access bleeding time < 10 minutes   Machine Disinfection Process Acid/Vinegar Clean;Heat Disinfect;Exterior Machine Disinfection   Rinseback Volume (ml) 300 ml   Blood Volume Processed (Liters) 51 L   Dialyzer Clearance Moderately streaked   Duration of Treatment (minutes) 180 minutes   Hemodialysis Intake (ml) 300 ml   Hemodialysis Output (ml) 1200 ml   NET Removed (ml) 900   Tolerated Treatment Good   Patient Response to Treatment Tolerated wekll on dopamine   Bilateral Breath Sounds Diminished   Edema None   Physician Notified No   Time Off 1930   Patient Disposition Remain in ICU/ED   Observations & Evaluations   Level of Consciousness 0   Oriented X 1   Heart Rhythm Regular   Respiratory Quality/Effort Unlabored   Skin Color Ecchymosis (comment)   Skin Condition/Temp Dry;Warm   Abdomen Inspection Soft   Comments -900cc net, confused, pulling at all lines, attempting to get up.

## 2024-11-22 NOTE — ANESTHESIA PRE PROCEDURE
Department of Anesthesiology  Preprocedure Note       Name:  Cleveland Thompson   Age:  79 y.o.  :  1945                                          MRN:  [id number]         Date:  2024      Surgeon: Surgeon(s):  Johnson Gonsalves MD    Procedure: Procedure(s):  Insert PPM dual    Medications prior to admission:   Prior to Admission medications    Medication Sig Start Date End Date Taking? Authorizing Provider   midodrine (PROAMATINE) 2.5 MG tablet Take 1 tablet by mouth in the morning and at bedtime Takes 5 mg twice daily on dialysis days. 24  Yes Radha Emerson MD   tiZANidine (ZANAFLEX) 2 MG tablet Take 1 tablet by mouth every 6 hours as needed  Patient not taking: Reported on 11/15/2024    Becky Munoz MD   polyethylene glycol (GLYCOLAX) 17 GM/SCOOP powder Take 17 g by mouth daily    Becky Munoz MD   acetaminophen (TYLENOL) 325 MG tablet Take 2 tablets by mouth every 4 hours as needed for Pain 10/31/24 11/15/24  Emma Yee MD   rosuvastatin (CRESTOR) 10 MG tablet TAKE ONE TABLET BY MOUTH EVERY EVENING 22   Becky Munoz MD   Apoaequorin (PREVAGEN PO) Take 1 capsule by mouth    Becky Munoz MD   apixaban (ELIQUIS) 2.5 MG TABS tablet Take 1 tablet by mouth 2 times daily    Becky Munoz MD   Probiotic Product (PROBIOTIC BLEND PO) Take by mouth    Becky Munoz MD   allopurinol (ZYLOPRIM) 100 MG tablet Take 1 tablet by mouth daily    Becky Munoz MD       Current medications:    Current Facility-Administered Medications   Medication Dose Route Frequency Provider Last Rate Last Admin    allopurinol (ZYLOPRIM) tablet 100 mg  100 mg Oral Daily Matthew Gustafson MD   100 mg at 24 0751    [Held by provider] apixaban (ELIQUIS) tablet 2.5 mg  2.5 mg Oral BID Matthew Gustafson MD        lactobacillus (CULTURELLE) capsule 1 capsule  1 capsule Oral Daily Matthew Gustafson MD   1 capsule at 24 0751    rosuvastatin

## 2024-11-22 NOTE — CARE COORDINATION
CM Update: Transfer order placed. Pt admitted to MICU with bradycardia. Pacer pads were placed and Dobutamine gtt started. EP consulted, will place pacer today. Pt is from home with private caregivers 11p-9a. He is also active with [facility], polly orders placed. Pt is ESRD, HD [facility]. Pt would benefit from post op therapy evals. He has been to [facility] in the past and wife agreeable if needed again. CM/SW to follow(TF)        ANGELA SalinasN,RN  Case Management  [phone]

## 2024-11-23 ENCOUNTER — APPOINTMENT (OUTPATIENT)
Dept: GENERAL RADIOLOGY | Age: 79
DRG: 242 | End: 2024-11-23
Payer: MEDICARE

## 2024-11-23 LAB
ALBUMIN SERPL-MCNC: 3.3 G/DL (ref 3.5–5.2)
ALP SERPL-CCNC: 79 U/L (ref 40–129)
ALT SERPL-CCNC: 7 U/L (ref 0–40)
ANION GAP SERPL CALCULATED.3IONS-SCNC: 13 MMOL/L (ref 7–16)
AST SERPL-CCNC: 12 U/L (ref 0–39)
BILIRUB SERPL-MCNC: 0.4 MG/DL (ref 0–1.2)
BNP SERPL-MCNC: ABNORMAL PG/ML (ref 0–450)
BUN SERPL-MCNC: 35 MG/DL (ref 6–23)
CALCIUM SERPL-MCNC: 8 MG/DL (ref 8.6–10.2)
CHLORIDE SERPL-SCNC: 103 MMOL/L (ref 98–107)
CO2 SERPL-SCNC: 23 MMOL/L (ref 22–29)
CREAT SERPL-MCNC: 7.8 MG/DL (ref 0.7–1.2)
CRP SERPL HS-MCNC: 148 MG/L (ref 0–5)
ERYTHROCYTE [DISTWIDTH] IN BLOOD BY AUTOMATED COUNT: 15.2 % (ref 11.5–15)
GFR, ESTIMATED: 6 ML/MIN/1.73M2
GLUCOSE BLD-MCNC: 100 MG/DL (ref 74–99)
GLUCOSE SERPL-MCNC: 114 MG/DL (ref 74–99)
HCT VFR BLD AUTO: 29 % (ref 37–54)
HGB BLD-MCNC: 8.9 G/DL (ref 12.5–16.5)
MAGNESIUM SERPL-MCNC: 2.2 MG/DL (ref 1.6–2.6)
MCH RBC QN AUTO: 31.8 PG (ref 26–35)
MCHC RBC AUTO-ENTMCNC: 30.7 G/DL (ref 32–34.5)
MCV RBC AUTO: 103.6 FL (ref 80–99.9)
MICROORGANISM SPEC CULT: NORMAL
PHOSPHATE SERPL-MCNC: 5.9 MG/DL (ref 2.5–4.5)
PLATELET # BLD AUTO: 119 K/UL (ref 130–450)
PMV BLD AUTO: 11.1 FL (ref 7–12)
POTASSIUM SERPL-SCNC: 5.1 MMOL/L (ref 3.5–5)
PROCALCITONIN SERPL-MCNC: 0.69 NG/ML (ref 0–0.08)
PROT SERPL-MCNC: 5.8 G/DL (ref 6.4–8.3)
RBC # BLD AUTO: 2.8 M/UL (ref 3.8–5.8)
SODIUM SERPL-SCNC: 139 MMOL/L (ref 132–146)
SPECIMEN DESCRIPTION: NORMAL
WBC OTHER # BLD: 11.3 K/UL (ref 4.5–11.5)

## 2024-11-23 PROCEDURE — 99233 SBSQ HOSP IP/OBS HIGH 50: CPT | Performed by: INTERNAL MEDICINE

## 2024-11-23 PROCEDURE — 83880 ASSAY OF NATRIURETIC PEPTIDE: CPT

## 2024-11-23 PROCEDURE — 2580000003 HC RX 258: Performed by: INTERNAL MEDICINE

## 2024-11-23 PROCEDURE — 6370000000 HC RX 637 (ALT 250 FOR IP): Performed by: STUDENT IN AN ORGANIZED HEALTH CARE EDUCATION/TRAINING PROGRAM

## 2024-11-23 PROCEDURE — 80053 COMPREHEN METABOLIC PANEL: CPT

## 2024-11-23 PROCEDURE — 99291 CRITICAL CARE FIRST HOUR: CPT | Performed by: STUDENT IN AN ORGANIZED HEALTH CARE EDUCATION/TRAINING PROGRAM

## 2024-11-23 PROCEDURE — 83735 ASSAY OF MAGNESIUM: CPT

## 2024-11-23 PROCEDURE — 84100 ASSAY OF PHOSPHORUS: CPT

## 2024-11-23 PROCEDURE — 82947 ASSAY GLUCOSE BLOOD QUANT: CPT

## 2024-11-23 PROCEDURE — 84145 PROCALCITONIN (PCT): CPT

## 2024-11-23 PROCEDURE — 71045 X-RAY EXAM CHEST 1 VIEW: CPT

## 2024-11-23 PROCEDURE — 6370000000 HC RX 637 (ALT 250 FOR IP): Performed by: INTERNAL MEDICINE

## 2024-11-23 PROCEDURE — 6360000002 HC RX W HCPCS: Performed by: INTERNAL MEDICINE

## 2024-11-23 PROCEDURE — 2140000000 HC CCU INTERMEDIATE R&B

## 2024-11-23 PROCEDURE — 86140 C-REACTIVE PROTEIN: CPT

## 2024-11-23 PROCEDURE — 2700000000 HC OXYGEN THERAPY PER DAY

## 2024-11-23 PROCEDURE — 85027 COMPLETE CBC AUTOMATED: CPT

## 2024-11-23 PROCEDURE — 90935 HEMODIALYSIS ONE EVALUATION: CPT

## 2024-11-23 PROCEDURE — 99231 SBSQ HOSP IP/OBS SF/LOW 25: CPT | Performed by: INTERNAL MEDICINE

## 2024-11-23 RX ORDER — METOPROLOL TARTRATE 25 MG/1
12.5 TABLET, FILM COATED ORAL 2 TIMES DAILY
Status: DISCONTINUED | OUTPATIENT
Start: 2024-11-23 | End: 2024-12-06 | Stop reason: HOSPADM

## 2024-11-23 RX ADMIN — METOPROLOL TARTRATE 12.5 MG: 25 TABLET, FILM COATED ORAL at 07:55

## 2024-11-23 RX ADMIN — AZITHROMYCIN DIHYDRATE 500 MG: 500 INJECTION, POWDER, LYOPHILIZED, FOR SOLUTION INTRAVENOUS at 14:08

## 2024-11-23 RX ADMIN — Medication 1 CAPSULE: at 07:55

## 2024-11-23 RX ADMIN — VANCOMYCIN HYDROCHLORIDE 1000 MG: 1 INJECTION, POWDER, LYOPHILIZED, FOR SOLUTION INTRAVENOUS at 15:12

## 2024-11-23 RX ADMIN — MIDODRINE HYDROCHLORIDE 5 MG: 5 TABLET ORAL at 11:55

## 2024-11-23 RX ADMIN — ALLOPURINOL 100 MG: 100 TABLET ORAL at 07:55

## 2024-11-23 RX ADMIN — WATER 1000 MG: 1 INJECTION INTRAMUSCULAR; INTRAVENOUS; SUBCUTANEOUS at 13:59

## 2024-11-23 RX ADMIN — SODIUM CHLORIDE, PRESERVATIVE FREE 10 ML: 5 INJECTION INTRAVENOUS at 07:56

## 2024-11-23 RX ADMIN — MIDODRINE HYDROCHLORIDE 5 MG: 5 TABLET ORAL at 16:41

## 2024-11-23 RX ADMIN — MIDODRINE HYDROCHLORIDE 5 MG: 5 TABLET ORAL at 07:54

## 2024-11-23 RX ADMIN — DEXTROSE AND SODIUM CHLORIDE: 5; 900 INJECTION, SOLUTION INTRAVENOUS at 08:44

## 2024-11-23 ASSESSMENT — PAIN SCALES - WONG BAKER
WONGBAKER_NUMERICALRESPONSE: NO HURT

## 2024-11-23 ASSESSMENT — PAIN SCALES - GENERAL
PAINLEVEL_OUTOF10: 0
PAINLEVEL_OUTOF10: 0

## 2024-11-23 NOTE — FLOWSHEET NOTE
11/23/24 1140   Vital Signs   /60   Temp 98 °F (36.7 °C)   Pulse 81   Respirations 23   SpO2 94 %   Weight - Scale 67.2 kg (148 lb 2.4 oz)   Weight Method Bed scale   Percent Weight Change -4   Post-Hemodialysis Assessment   Post-Treatment Procedures Blood returned;Access bleeding time < 10 minutes   Machine Disinfection Process Exterior Machine Disinfection   Rinseback Volume (ml) 300 ml   Blood Volume Processed (Liters) 50.4 L   Dialyzer Clearance Lightly streaked   Duration of Treatment (minutes) 180 minutes   Heparin Amount Administered During Treatment (mL) 0 mL   Hemodialysis Intake (ml) 300 ml   Hemodialysis Output (ml) 1700 ml   NET Removed (ml) 1400   Tolerated Treatment Good   Patient Response to Treatment treatment complete, patient tolerated well   Bilateral Breath Sounds Diminished   Edema None   Physician Notified No   Time Off 1119   Patient Disposition Remain in ICU/ED   Observations & Evaluations   Level of Consciousness 0   Heart Rhythm Regular   Respiratory Quality/Effort Unlabored   O2 Device Nasal cannula

## 2024-11-23 NOTE — DISCHARGE INSTRUCTIONS
pacemaker.  When using a cell phone, use the ear opposite the side of your pacemaker if possible    Special Instructions:  Let your dentist, doctor, or medical specialist know that you have a pacemaker so precautions can be taken to protect the pacemaker.  Your pacemaker may set off a metal detector, such as those used in airports and courtrooms.  Let security know that you have an pacemaker & show them your card.    ID Card:  You will have a temporary ID card until a permanent card is sent to you by the device company.  The permanent card will look like a 's license or credit card and should arrive within 8 weeks.  Carry your ID card with you at all times.    What else do I need to know about my pacemaker or defibrillator?  Do not carry a cellular phone in a pocket within six inches of the pacemaker or defibrillator as this can affect the operation of the unit.  Hold the cell phone on the opposite ear as the pacemaker insertion site.  Do not walk through airport security systems as these devices can detect the metal in your pacemaker and possibly alarm. The new full body scanners are safe for both pacemakers and defibrillators.  Keep your pacemaker/defibrillator ID card with you at all times and ask security to clear you with a hand search only if a full body scanner is not available.  Do not let security use a hand-held wand.  Avoid passing through metal detectors (for example in shopping malls and schools). If you must pass through, walk quickly through. These detectors can interfere with the pacemaker and defibrillator function.  Do not touch the spark plug or distributor on running car or  - turn engine off first.  Avoid holding magnets near your pacemaker or defibrillator.    Remote Monitor:  You may receive your monitor prior to leaving the hospital, if not a monitor can be mailed to your home. In some cases, an wing can be used with newer smartphones to provide monitoring services. Please

## 2024-11-24 LAB
ALBUMIN SERPL-MCNC: 2.9 G/DL (ref 3.5–5.2)
ALP SERPL-CCNC: 76 U/L (ref 40–129)
ALT SERPL-CCNC: 7 U/L (ref 0–40)
ANION GAP SERPL CALCULATED.3IONS-SCNC: 12 MMOL/L (ref 7–16)
AST SERPL-CCNC: 18 U/L (ref 0–39)
B BURGDOR AB SER IA-ACNC: 0.46 IV
BILIRUB SERPL-MCNC: 0.3 MG/DL (ref 0–1.2)
BUN SERPL-MCNC: 29 MG/DL (ref 6–23)
CALCIUM SERPL-MCNC: 8.4 MG/DL (ref 8.6–10.2)
CHLORIDE SERPL-SCNC: 98 MMOL/L (ref 98–107)
CO2 SERPL-SCNC: 26 MMOL/L (ref 22–29)
CREAT SERPL-MCNC: 5.8 MG/DL (ref 0.7–1.2)
GFR, ESTIMATED: 9 ML/MIN/1.73M2
GLUCOSE BLD-MCNC: 82 MG/DL (ref 74–99)
GLUCOSE BLD-MCNC: 97 MG/DL (ref 74–99)
GLUCOSE BLD-MCNC: 98 MG/DL (ref 74–99)
GLUCOSE SERPL-MCNC: 97 MG/DL (ref 74–99)
HCT VFR BLD AUTO: 28.5 % (ref 37–54)
HGB BLD-MCNC: 8.6 G/DL (ref 12.5–16.5)
POTASSIUM SERPL-SCNC: 4.2 MMOL/L (ref 3.5–5)
PROT SERPL-MCNC: 5.5 G/DL (ref 6.4–8.3)
SODIUM SERPL-SCNC: 136 MMOL/L (ref 132–146)

## 2024-11-24 PROCEDURE — 2580000003 HC RX 258: Performed by: INTERNAL MEDICINE

## 2024-11-24 PROCEDURE — 6360000002 HC RX W HCPCS: Performed by: INTERNAL MEDICINE

## 2024-11-24 PROCEDURE — 2700000000 HC OXYGEN THERAPY PER DAY

## 2024-11-24 PROCEDURE — 6370000000 HC RX 637 (ALT 250 FOR IP): Performed by: INTERNAL MEDICINE

## 2024-11-24 PROCEDURE — 85014 HEMATOCRIT: CPT

## 2024-11-24 PROCEDURE — 36415 COLL VENOUS BLD VENIPUNCTURE: CPT

## 2024-11-24 PROCEDURE — 80053 COMPREHEN METABOLIC PANEL: CPT

## 2024-11-24 PROCEDURE — 2140000000 HC CCU INTERMEDIATE R&B

## 2024-11-24 PROCEDURE — 99232 SBSQ HOSP IP/OBS MODERATE 35: CPT | Performed by: INTERNAL MEDICINE

## 2024-11-24 PROCEDURE — 85018 HEMOGLOBIN: CPT

## 2024-11-24 PROCEDURE — 82947 ASSAY GLUCOSE BLOOD QUANT: CPT

## 2024-11-24 RX ADMIN — ALLOPURINOL 100 MG: 100 TABLET ORAL at 08:56

## 2024-11-24 RX ADMIN — SODIUM CHLORIDE, PRESERVATIVE FREE 10 ML: 5 INJECTION INTRAVENOUS at 23:57

## 2024-11-24 RX ADMIN — MIDODRINE HYDROCHLORIDE 5 MG: 5 TABLET ORAL at 16:31

## 2024-11-24 RX ADMIN — MIDODRINE HYDROCHLORIDE 5 MG: 5 TABLET ORAL at 08:54

## 2024-11-24 RX ADMIN — MIDODRINE HYDROCHLORIDE 5 MG: 5 TABLET ORAL at 12:19

## 2024-11-24 RX ADMIN — ACETAMINOPHEN 650 MG: 325 TABLET ORAL at 09:05

## 2024-11-24 RX ADMIN — WATER 1000 MG: 1 INJECTION INTRAMUSCULAR; INTRAVENOUS; SUBCUTANEOUS at 12:21

## 2024-11-24 RX ADMIN — Medication 1 CAPSULE: at 08:55

## 2024-11-24 RX ADMIN — AZITHROMYCIN DIHYDRATE 500 MG: 500 INJECTION, POWDER, LYOPHILIZED, FOR SOLUTION INTRAVENOUS at 12:26

## 2024-11-24 RX ADMIN — SODIUM CHLORIDE, PRESERVATIVE FREE 10 ML: 5 INJECTION INTRAVENOUS at 08:56

## 2024-11-24 RX ADMIN — SODIUM CHLORIDE, PRESERVATIVE FREE 10 ML: 5 INJECTION INTRAVENOUS at 01:54

## 2024-11-24 ASSESSMENT — PAIN SCALES - GENERAL: PAINLEVEL_OUTOF10: 3

## 2024-11-24 ASSESSMENT — PAIN DESCRIPTION - LOCATION: LOCATION: ARM

## 2024-11-24 ASSESSMENT — PAIN DESCRIPTION - ORIENTATION: ORIENTATION: RIGHT

## 2024-11-24 ASSESSMENT — PAIN - FUNCTIONAL ASSESSMENT: PAIN_FUNCTIONAL_ASSESSMENT: ACTIVITIES ARE NOT PREVENTED

## 2024-11-24 ASSESSMENT — PAIN DESCRIPTION - DESCRIPTORS: DESCRIPTORS: ACHING

## 2024-11-25 LAB
ALBUMIN SERPL-MCNC: 2.9 G/DL (ref 3.5–5.2)
ALP SERPL-CCNC: 70 U/L (ref 40–129)
ALT SERPL-CCNC: 9 U/L (ref 0–40)
ANION GAP SERPL CALCULATED.3IONS-SCNC: 15 MMOL/L (ref 7–16)
AST SERPL-CCNC: 18 U/L (ref 0–39)
BASOPHILS # BLD: 0.04 K/UL (ref 0–0.2)
BASOPHILS NFR BLD: 1 % (ref 0–2)
BILIRUB SERPL-MCNC: 0.2 MG/DL (ref 0–1.2)
BNP SERPL-MCNC: ABNORMAL PG/ML (ref 0–450)
BUN SERPL-MCNC: 45 MG/DL (ref 6–23)
CALCIUM SERPL-MCNC: 8.1 MG/DL (ref 8.6–10.2)
CHLORIDE SERPL-SCNC: 101 MMOL/L (ref 98–107)
CO2 SERPL-SCNC: 25 MMOL/L (ref 22–29)
CREAT SERPL-MCNC: 7.3 MG/DL (ref 0.7–1.2)
CRP SERPL HS-MCNC: 185 MG/L (ref 0–5)
EOSINOPHIL # BLD: 0.36 K/UL (ref 0.05–0.5)
EOSINOPHILS RELATIVE PERCENT: 4 % (ref 0–6)
ERYTHROCYTE [DISTWIDTH] IN BLOOD BY AUTOMATED COUNT: 14.6 % (ref 11.5–15)
FOLATE SERPL-MCNC: 4.2 NG/ML (ref 4.8–24.2)
GFR, ESTIMATED: 7 ML/MIN/1.73M2
GLUCOSE BLD-MCNC: 112 MG/DL (ref 74–99)
GLUCOSE BLD-MCNC: 95 MG/DL (ref 74–99)
GLUCOSE SERPL-MCNC: 87 MG/DL (ref 74–99)
HCT VFR BLD AUTO: 27.2 % (ref 37–54)
HGB BLD-MCNC: 8.5 G/DL (ref 12.5–16.5)
IMM GRANULOCYTES # BLD AUTO: 0.05 K/UL (ref 0–0.58)
IMM GRANULOCYTES NFR BLD: 1 % (ref 0–5)
LYMPHOCYTES NFR BLD: 1.01 K/UL (ref 1.5–4)
LYMPHOCYTES RELATIVE PERCENT: 12 % (ref 20–42)
MAGNESIUM SERPL-MCNC: 2.2 MG/DL (ref 1.6–2.6)
MCH RBC QN AUTO: 32.2 PG (ref 26–35)
MCHC RBC AUTO-ENTMCNC: 31.3 G/DL (ref 32–34.5)
MCV RBC AUTO: 103 FL (ref 80–99.9)
MONOCYTES NFR BLD: 0.7 K/UL (ref 0.1–0.95)
MONOCYTES NFR BLD: 9 % (ref 2–12)
NEUTROPHILS NFR BLD: 74 % (ref 43–80)
NEUTS SEG NFR BLD: 6.1 K/UL (ref 1.8–7.3)
PHOSPHATE SERPL-MCNC: 5.7 MG/DL (ref 2.5–4.5)
PLATELET # BLD AUTO: 139 K/UL (ref 130–450)
PMV BLD AUTO: 10.9 FL (ref 7–12)
POTASSIUM SERPL-SCNC: 4.1 MMOL/L (ref 3.5–5)
PROT SERPL-MCNC: 5.3 G/DL (ref 6.4–8.3)
RBC # BLD AUTO: 2.64 M/UL (ref 3.8–5.8)
SODIUM SERPL-SCNC: 141 MMOL/L (ref 132–146)
VIT B12 SERPL-MCNC: 887 PG/ML (ref 211–946)
WBC OTHER # BLD: 8.3 K/UL (ref 4.5–11.5)

## 2024-11-25 PROCEDURE — 6360000002 HC RX W HCPCS: Performed by: INTERNAL MEDICINE

## 2024-11-25 PROCEDURE — 99233 SBSQ HOSP IP/OBS HIGH 50: CPT | Performed by: STUDENT IN AN ORGANIZED HEALTH CARE EDUCATION/TRAINING PROGRAM

## 2024-11-25 PROCEDURE — 6360000002 HC RX W HCPCS: Performed by: STUDENT IN AN ORGANIZED HEALTH CARE EDUCATION/TRAINING PROGRAM

## 2024-11-25 PROCEDURE — 2140000000 HC CCU INTERMEDIATE R&B

## 2024-11-25 PROCEDURE — 86140 C-REACTIVE PROTEIN: CPT

## 2024-11-25 PROCEDURE — 2580000003 HC RX 258: Performed by: INTERNAL MEDICINE

## 2024-11-25 PROCEDURE — 82947 ASSAY GLUCOSE BLOOD QUANT: CPT

## 2024-11-25 PROCEDURE — 85025 COMPLETE CBC W/AUTO DIFF WBC: CPT

## 2024-11-25 PROCEDURE — 2580000003 HC RX 258: Performed by: STUDENT IN AN ORGANIZED HEALTH CARE EDUCATION/TRAINING PROGRAM

## 2024-11-25 PROCEDURE — 84100 ASSAY OF PHOSPHORUS: CPT

## 2024-11-25 PROCEDURE — 6370000000 HC RX 637 (ALT 250 FOR IP): Performed by: INTERNAL MEDICINE

## 2024-11-25 PROCEDURE — 83880 ASSAY OF NATRIURETIC PEPTIDE: CPT

## 2024-11-25 PROCEDURE — 83735 ASSAY OF MAGNESIUM: CPT

## 2024-11-25 PROCEDURE — 82746 ASSAY OF FOLIC ACID SERUM: CPT

## 2024-11-25 PROCEDURE — 82607 VITAMIN B-12: CPT

## 2024-11-25 PROCEDURE — 90935 HEMODIALYSIS ONE EVALUATION: CPT

## 2024-11-25 PROCEDURE — 2700000000 HC OXYGEN THERAPY PER DAY

## 2024-11-25 PROCEDURE — 6370000000 HC RX 637 (ALT 250 FOR IP): Performed by: STUDENT IN AN ORGANIZED HEALTH CARE EDUCATION/TRAINING PROGRAM

## 2024-11-25 PROCEDURE — 80053 COMPREHEN METABOLIC PANEL: CPT

## 2024-11-25 PROCEDURE — 36415 COLL VENOUS BLD VENIPUNCTURE: CPT

## 2024-11-25 RX ORDER — MECOBALAMIN 5000 MCG
5 TABLET,DISINTEGRATING ORAL NIGHTLY
Status: DISCONTINUED | OUTPATIENT
Start: 2024-11-25 | End: 2024-12-06 | Stop reason: HOSPADM

## 2024-11-25 RX ADMIN — SODIUM CHLORIDE, PRESERVATIVE FREE 10 ML: 5 INJECTION INTRAVENOUS at 10:59

## 2024-11-25 RX ADMIN — APIXABAN 2.5 MG: 2.5 TABLET, FILM COATED ORAL at 10:52

## 2024-11-25 RX ADMIN — ALLOPURINOL 100 MG: 100 TABLET ORAL at 10:53

## 2024-11-25 RX ADMIN — AZITHROMYCIN DIHYDRATE 500 MG: 500 INJECTION, POWDER, LYOPHILIZED, FOR SOLUTION INTRAVENOUS at 12:29

## 2024-11-25 RX ADMIN — Medication 1 CAPSULE: at 10:53

## 2024-11-25 RX ADMIN — MIDODRINE HYDROCHLORIDE 5 MG: 5 TABLET ORAL at 10:45

## 2024-11-25 RX ADMIN — MIDODRINE HYDROCHLORIDE 5 MG: 5 TABLET ORAL at 17:50

## 2024-11-25 RX ADMIN — MIDODRINE HYDROCHLORIDE 5 MG: 5 TABLET ORAL at 12:05

## 2024-11-25 RX ADMIN — WATER 1000 MG: 1 INJECTION INTRAMUSCULAR; INTRAVENOUS; SUBCUTANEOUS at 12:07

## 2024-11-25 RX ADMIN — ZIPRASIDONE MESYLATE 10 MG: 20 INJECTION, POWDER, LYOPHILIZED, FOR SOLUTION INTRAMUSCULAR at 10:46

## 2024-11-25 ASSESSMENT — PAIN SCALES - GENERAL: PAINLEVEL_OUTOF10: 0

## 2024-11-25 ASSESSMENT — PAIN SCALES - WONG BAKER: WONGBAKER_NUMERICALRESPONSE: NO HURT
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
## 2024-11-25 NOTE — ANESTHESIA POSTPROCEDURE EVALUATION
Department of Anesthesiology  Postprocedure Note    Patient: Cleveland Thompson  MRN: [id number]  YOB: 1945  Date of evaluation: 11/25/2024    Procedure Summary       Date: 11/22/24 [place] [facility] [place] [facility] [place] LAB    Anesthesia Start: 0905 Anesthesia Stop: 1119    Procedure: Insert PPM dual Diagnosis:       Bradycardia      (Bradycardia [R00.1])    Providers: Johnson Gonsalves MD Responsible Provider: Vel Lanier DO    Anesthesia Type: MAC ASA Status: 4            Anesthesia Type: No value filed.    Becky Phase I:      Becky Phase II:      Anesthesia Post Evaluation    Patient location during evaluation: bedside  Patient participation: complete - patient cannot participate  Level of consciousness: awake and alert  Airway patency: patent  Nausea & Vomiting: no nausea and no vomiting  Cardiovascular status: blood pressure returned to baseline  Respiratory status: acceptable  Hydration status: euvolemic  Multimodal analgesia pain management approach    There were no known notable events for this encounter.

## 2024-11-25 NOTE — FLOWSHEET NOTE
11/25/24 1001   Vital Signs   BP (!) 100/57   Temp (!) 96.7 °F (35.9 °C)   Pulse 66   Respirations 16   Weight - Scale 68.6 kg (151 lb 3.8 oz)   Weight Method Bed scale   Percent Weight Change 0.09   Post-Hemodialysis Assessment   Post-Treatment Procedures Blood returned;Access bleeding time < 10 minutes   Machine Disinfection Process Exterior Machine Disinfection   Rinseback Volume (ml) 300 ml   Blood Volume Processed (Liters) 50.4 L   Dialyzer Clearance Lightly streaked   Duration of Treatment (minutes) 180 minutes   Heparin Amount Administered During Treatment (mL) 0 mL   Hemodialysis Intake (ml) 300 ml   Hemodialysis Output (ml) 1500 ml   NET Removed (ml) 1200   Tolerated Treatment Good   Patient Response to Treatment tolerated well, treatment complete, blood returnred, needles pulled sites held, stasis achieved, bandaids applied, +thirll, +bruit, pt beligerent with staff, cursing.  Attempt to redirect pt without success, pt has sitter and restraints

## 2024-11-26 LAB
ALBUMIN SERPL-MCNC: 3.1 G/DL (ref 3.5–5.2)
ALP SERPL-CCNC: 70 U/L (ref 40–129)
ALT SERPL-CCNC: 11 U/L (ref 0–40)
ANION GAP SERPL CALCULATED.3IONS-SCNC: 12 MMOL/L (ref 7–16)
AST SERPL-CCNC: 20 U/L (ref 0–39)
BASOPHILS # BLD: 0.04 K/UL (ref 0–0.2)
BASOPHILS NFR BLD: 1 % (ref 0–2)
BILIRUB SERPL-MCNC: 0.3 MG/DL (ref 0–1.2)
BNP SERPL-MCNC: ABNORMAL PG/ML (ref 0–450)
BUN SERPL-MCNC: 22 MG/DL (ref 6–23)
CALCIUM SERPL-MCNC: 8.4 MG/DL (ref 8.6–10.2)
CHLORIDE SERPL-SCNC: 97 MMOL/L (ref 98–107)
CO2 SERPL-SCNC: 30 MMOL/L (ref 22–29)
CREAT SERPL-MCNC: 5.1 MG/DL (ref 0.7–1.2)
CRP SERPL HS-MCNC: 99 MG/L (ref 0–5)
EOSINOPHIL # BLD: 0.22 K/UL (ref 0.05–0.5)
EOSINOPHILS RELATIVE PERCENT: 3 % (ref 0–6)
ERYTHROCYTE [DISTWIDTH] IN BLOOD BY AUTOMATED COUNT: 14.6 % (ref 11.5–15)
GFR, ESTIMATED: 11 ML/MIN/1.73M2
GLUCOSE BLD-MCNC: 84 MG/DL (ref 74–99)
GLUCOSE BLD-MCNC: 86 MG/DL (ref 74–99)
GLUCOSE BLD-MCNC: 95 MG/DL (ref 74–99)
GLUCOSE SERPL-MCNC: 79 MG/DL (ref 74–99)
HCT VFR BLD AUTO: 26.8 % (ref 37–54)
HGB BLD-MCNC: 8.3 G/DL (ref 12.5–16.5)
IMM GRANULOCYTES # BLD AUTO: 0.05 K/UL (ref 0–0.58)
IMM GRANULOCYTES NFR BLD: 1 % (ref 0–5)
LYMPHOCYTES NFR BLD: 1.08 K/UL (ref 1.5–4)
LYMPHOCYTES RELATIVE PERCENT: 15 % (ref 20–42)
MAGNESIUM SERPL-MCNC: 2.1 MG/DL (ref 1.6–2.6)
MCH RBC QN AUTO: 31.6 PG (ref 26–35)
MCHC RBC AUTO-ENTMCNC: 31 G/DL (ref 32–34.5)
MCV RBC AUTO: 101.9 FL (ref 80–99.9)
MICROORGANISM SPEC CULT: NORMAL
MONOCYTES NFR BLD: 0.81 K/UL (ref 0.1–0.95)
MONOCYTES NFR BLD: 11 % (ref 2–12)
NEUTROPHILS NFR BLD: 70 % (ref 43–80)
NEUTS SEG NFR BLD: 5.25 K/UL (ref 1.8–7.3)
PHOSPHATE SERPL-MCNC: 3.7 MG/DL (ref 2.5–4.5)
PLATELET # BLD AUTO: 154 K/UL (ref 130–450)
PMV BLD AUTO: 10.4 FL (ref 7–12)
POTASSIUM SERPL-SCNC: 3.9 MMOL/L (ref 3.5–5)
PROT SERPL-MCNC: 5.5 G/DL (ref 6.4–8.3)
RBC # BLD AUTO: 2.63 M/UL (ref 3.8–5.8)
SERVICE CMNT-IMP: NORMAL
SODIUM SERPL-SCNC: 139 MMOL/L (ref 132–146)
SPECIMEN DESCRIPTION: NORMAL
WBC OTHER # BLD: 7.5 K/UL (ref 4.5–11.5)

## 2024-11-26 PROCEDURE — 6360000002 HC RX W HCPCS: Performed by: INTERNAL MEDICINE

## 2024-11-26 PROCEDURE — 6370000000 HC RX 637 (ALT 250 FOR IP): Performed by: STUDENT IN AN ORGANIZED HEALTH CARE EDUCATION/TRAINING PROGRAM

## 2024-11-26 PROCEDURE — 86140 C-REACTIVE PROTEIN: CPT

## 2024-11-26 PROCEDURE — 2700000000 HC OXYGEN THERAPY PER DAY

## 2024-11-26 PROCEDURE — 85025 COMPLETE CBC W/AUTO DIFF WBC: CPT

## 2024-11-26 PROCEDURE — 6370000000 HC RX 637 (ALT 250 FOR IP): Performed by: INTERNAL MEDICINE

## 2024-11-26 PROCEDURE — 36415 COLL VENOUS BLD VENIPUNCTURE: CPT

## 2024-11-26 PROCEDURE — 83735 ASSAY OF MAGNESIUM: CPT

## 2024-11-26 PROCEDURE — 2580000003 HC RX 258: Performed by: INTERNAL MEDICINE

## 2024-11-26 PROCEDURE — 2140000000 HC CCU INTERMEDIATE R&B

## 2024-11-26 PROCEDURE — 82947 ASSAY GLUCOSE BLOOD QUANT: CPT

## 2024-11-26 PROCEDURE — 83880 ASSAY OF NATRIURETIC PEPTIDE: CPT

## 2024-11-26 PROCEDURE — 84100 ASSAY OF PHOSPHORUS: CPT

## 2024-11-26 PROCEDURE — 99233 SBSQ HOSP IP/OBS HIGH 50: CPT | Performed by: STUDENT IN AN ORGANIZED HEALTH CARE EDUCATION/TRAINING PROGRAM

## 2024-11-26 PROCEDURE — 80053 COMPREHEN METABOLIC PANEL: CPT

## 2024-11-26 RX ORDER — DIVALPROEX SODIUM 125 MG/1
125 CAPSULE, COATED PELLETS ORAL EVERY 8 HOURS SCHEDULED
Status: DISCONTINUED | OUTPATIENT
Start: 2024-11-26 | End: 2024-11-28

## 2024-11-26 RX ADMIN — ALLOPURINOL 100 MG: 100 TABLET ORAL at 12:40

## 2024-11-26 RX ADMIN — MIRTAZAPINE 7.5 MG: 15 TABLET, FILM COATED ORAL at 21:29

## 2024-11-26 RX ADMIN — WATER 1000 MG: 1 INJECTION INTRAMUSCULAR; INTRAVENOUS; SUBCUTANEOUS at 14:57

## 2024-11-26 RX ADMIN — ANORECTAL OINTMENT: 15.7; .44; 24; 20.6 OINTMENT TOPICAL at 21:30

## 2024-11-26 RX ADMIN — APIXABAN 2.5 MG: 2.5 TABLET, FILM COATED ORAL at 12:40

## 2024-11-26 RX ADMIN — MIDODRINE HYDROCHLORIDE 5 MG: 5 TABLET ORAL at 17:52

## 2024-11-26 RX ADMIN — MIDODRINE HYDROCHLORIDE 5 MG: 5 TABLET ORAL at 12:40

## 2024-11-26 RX ADMIN — DIVALPROEX SODIUM 125 MG: 125 CAPSULE, COATED PELLETS ORAL at 14:56

## 2024-11-26 RX ADMIN — ROSUVASTATIN CALCIUM 10 MG: 5 TABLET, FILM COATED ORAL at 21:29

## 2024-11-26 RX ADMIN — SODIUM CHLORIDE, PRESERVATIVE FREE 10 ML: 5 INJECTION INTRAVENOUS at 21:30

## 2024-11-26 RX ADMIN — APIXABAN 2.5 MG: 2.5 TABLET, FILM COATED ORAL at 21:29

## 2024-11-26 RX ADMIN — DIVALPROEX SODIUM 125 MG: 125 CAPSULE, COATED PELLETS ORAL at 21:29

## 2024-11-26 RX ADMIN — Medication 5 MG: at 21:29

## 2024-11-26 RX ADMIN — Medication 1 CAPSULE: at 12:40

## 2024-11-26 NOTE — FLOWSHEET NOTE
Inpatient Wound Care (Initial consult) 6411A    Admit Date: 11/20/2024  5:16 PM    Reason for consult:  Gluteal cleft    Significant history: Per H&P    History Of Present Illness:    79-year-old male past medical history of CKD, DVT on Eliquis, abdominal aortic aneurysm presented due to hypotension and bradycardia during fistulogram.  He was found to be bradycardic in the 30s and also hypotensive in the 80s brought to the ER.  At baseline he does take midodrine for hypotension and per wife he usually has bradycardia with heart rates in the low 50s and mid 40s.  Nephrology did speak to EP recommended admission for possible pacemaker placement.  Patient was given IV fluids normal saline 1 L also 10 mg of midodrine in the ER.  EKG with fifth degree AV block low voltage QTc 451.  Wife at bedside mention his heart rate does run low in the mid 50s and low 60s.  But today it was in the 30s that was very concerning.  Patient denies any chest pain.    Findings:     11/26/24 1445   Skin Integumentary    Skin Integrity   (dry flaky)   Location bilateral feet   Skin Integrity Site 2   Skin Integrity Location 2 Ecchymosis   Location 2 BUE   Wound 11/21/24 Coccyx   Date First Assessed/Time First Assessed: 11/21/24 0005   Present on Original Admission: Yes  Primary Wound Type: Pressure Injury  Location: Coccyx   Wound Image    Wound Etiology Pressure Stage 2   Dressing/Treatment Pharmaceutical agent (see MAR)   Wound Length (cm) 1 cm   Wound Width (cm) 0.5 cm   Wound Depth (cm) 0.1 cm   Wound Surface Area (cm^2) 0.5 cm^2   Change in Wound Size % (l*w) 33.33   Wound Volume (cm^3) 0.05 cm^3   Wound Healing % 33   Wound Assessment Pink/red   Drainage Amount None (dry)   Odor None   Mary Grace-wound Assessment Maceration       **Informed Consent**    The patient has given verbal consent to have photos taken of wound and inserted into their chart as part of their permanent medical record for purposes of documentation, treatment management
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
## 2024-11-27 PROBLEM — E44.0 MODERATE PROTEIN-CALORIE MALNUTRITION (HCC): Chronic | Status: ACTIVE | Noted: 2024-11-27

## 2024-11-27 LAB
ALBUMIN SERPL-MCNC: 2.9 G/DL (ref 3.5–5.2)
ALP SERPL-CCNC: 69 U/L (ref 40–129)
ALT SERPL-CCNC: 12 U/L (ref 0–40)
ANION GAP SERPL CALCULATED.3IONS-SCNC: 14 MMOL/L (ref 7–16)
AST SERPL-CCNC: 22 U/L (ref 0–39)
BASOPHILS # BLD: 0.06 K/UL (ref 0–0.2)
BASOPHILS NFR BLD: 1 % (ref 0–2)
BILIRUB SERPL-MCNC: 0.2 MG/DL (ref 0–1.2)
BNP SERPL-MCNC: ABNORMAL PG/ML (ref 0–450)
BUN SERPL-MCNC: 38 MG/DL (ref 6–23)
CALCIUM SERPL-MCNC: 7.9 MG/DL (ref 8.6–10.2)
CHLORIDE SERPL-SCNC: 97 MMOL/L (ref 98–107)
CO2 SERPL-SCNC: 25 MMOL/L (ref 22–29)
CREAT SERPL-MCNC: 7.2 MG/DL (ref 0.7–1.2)
CRP SERPL HS-MCNC: 65 MG/L (ref 0–5)
EKG ATRIAL RATE: 60 BPM
EKG ATRIAL RATE: 61 BPM
EKG P-R INTERVAL: 200 MS
EKG Q-T INTERVAL: 416 MS
EKG Q-T INTERVAL: 458 MS
EKG QRS DURATION: 58 MS
EKG QRS DURATION: 60 MS
EKG QTC CALCULATION (BAZETT): 418 MS
EKG QTC CALCULATION (BAZETT): 458 MS
EKG R AXIS: 44 DEGREES
EKG R AXIS: 68 DEGREES
EKG T AXIS: 36 DEGREES
EKG T AXIS: 58 DEGREES
EKG VENTRICULAR RATE: 60 BPM
EKG VENTRICULAR RATE: 61 BPM
EOSINOPHIL # BLD: 0.38 K/UL (ref 0.05–0.5)
EOSINOPHILS RELATIVE PERCENT: 5 % (ref 0–6)
ERYTHROCYTE [DISTWIDTH] IN BLOOD BY AUTOMATED COUNT: 14.6 % (ref 11.5–15)
GFR, ESTIMATED: 7 ML/MIN/1.73M2
GLUCOSE BLD-MCNC: 92 MG/DL (ref 74–99)
GLUCOSE SERPL-MCNC: 83 MG/DL (ref 74–99)
HCT VFR BLD AUTO: 29.6 % (ref 37–54)
HGB BLD-MCNC: 8.8 G/DL (ref 12.5–16.5)
IMM GRANULOCYTES # BLD AUTO: 0.04 K/UL (ref 0–0.58)
IMM GRANULOCYTES NFR BLD: 1 % (ref 0–5)
LYMPHOCYTES NFR BLD: 1.38 K/UL (ref 1.5–4)
LYMPHOCYTES RELATIVE PERCENT: 18 % (ref 20–42)
MAGNESIUM SERPL-MCNC: 2.3 MG/DL (ref 1.6–2.6)
MCH RBC QN AUTO: 31 PG (ref 26–35)
MCHC RBC AUTO-ENTMCNC: 29.7 G/DL (ref 32–34.5)
MCV RBC AUTO: 104.2 FL (ref 80–99.9)
MICROORGANISM SPEC CULT: NORMAL
MONOCYTES NFR BLD: 0.84 K/UL (ref 0.1–0.95)
MONOCYTES NFR BLD: 11 % (ref 2–12)
NEUTROPHILS NFR BLD: 65 % (ref 43–80)
NEUTS SEG NFR BLD: 5.1 K/UL (ref 1.8–7.3)
PHOSPHATE SERPL-MCNC: 4.8 MG/DL (ref 2.5–4.5)
PLATELET # BLD AUTO: 178 K/UL (ref 130–450)
PMV BLD AUTO: 10.1 FL (ref 7–12)
POTASSIUM SERPL-SCNC: 4.5 MMOL/L (ref 3.5–5)
PROT SERPL-MCNC: 5.3 G/DL (ref 6.4–8.3)
RBC # BLD AUTO: 2.84 M/UL (ref 3.8–5.8)
SERVICE CMNT-IMP: NORMAL
SODIUM SERPL-SCNC: 136 MMOL/L (ref 132–146)
SPECIMEN DESCRIPTION: NORMAL
WBC OTHER # BLD: 7.8 K/UL (ref 4.5–11.5)

## 2024-11-27 PROCEDURE — 2700000000 HC OXYGEN THERAPY PER DAY

## 2024-11-27 PROCEDURE — 2580000003 HC RX 258: Performed by: INTERNAL MEDICINE

## 2024-11-27 PROCEDURE — 2580000003 HC RX 258: Performed by: STUDENT IN AN ORGANIZED HEALTH CARE EDUCATION/TRAINING PROGRAM

## 2024-11-27 PROCEDURE — 83880 ASSAY OF NATRIURETIC PEPTIDE: CPT

## 2024-11-27 PROCEDURE — 6370000000 HC RX 637 (ALT 250 FOR IP): Performed by: INTERNAL MEDICINE

## 2024-11-27 PROCEDURE — 6360000002 HC RX W HCPCS: Performed by: STUDENT IN AN ORGANIZED HEALTH CARE EDUCATION/TRAINING PROGRAM

## 2024-11-27 PROCEDURE — 2140000000 HC CCU INTERMEDIATE R&B

## 2024-11-27 PROCEDURE — 82947 ASSAY GLUCOSE BLOOD QUANT: CPT

## 2024-11-27 PROCEDURE — 80053 COMPREHEN METABOLIC PANEL: CPT

## 2024-11-27 PROCEDURE — 6360000002 HC RX W HCPCS: Performed by: INTERNAL MEDICINE

## 2024-11-27 PROCEDURE — 6370000000 HC RX 637 (ALT 250 FOR IP): Performed by: STUDENT IN AN ORGANIZED HEALTH CARE EDUCATION/TRAINING PROGRAM

## 2024-11-27 PROCEDURE — 99232 SBSQ HOSP IP/OBS MODERATE 35: CPT | Performed by: STUDENT IN AN ORGANIZED HEALTH CARE EDUCATION/TRAINING PROGRAM

## 2024-11-27 PROCEDURE — 83735 ASSAY OF MAGNESIUM: CPT

## 2024-11-27 PROCEDURE — 86140 C-REACTIVE PROTEIN: CPT

## 2024-11-27 PROCEDURE — 90935 HEMODIALYSIS ONE EVALUATION: CPT

## 2024-11-27 PROCEDURE — 85025 COMPLETE CBC W/AUTO DIFF WBC: CPT

## 2024-11-27 PROCEDURE — 84100 ASSAY OF PHOSPHORUS: CPT

## 2024-11-27 PROCEDURE — 36415 COLL VENOUS BLD VENIPUNCTURE: CPT

## 2024-11-27 RX ADMIN — Medication 5 MG: at 21:08

## 2024-11-27 RX ADMIN — WATER 1000 MG: 1 INJECTION INTRAMUSCULAR; INTRAVENOUS; SUBCUTANEOUS at 12:54

## 2024-11-27 RX ADMIN — ZIPRASIDONE MESYLATE 10 MG: 20 INJECTION, POWDER, LYOPHILIZED, FOR SOLUTION INTRAMUSCULAR at 23:47

## 2024-11-27 RX ADMIN — SODIUM CHLORIDE, PRESERVATIVE FREE 10 ML: 5 INJECTION INTRAVENOUS at 23:53

## 2024-11-27 RX ADMIN — MIRTAZAPINE 7.5 MG: 15 TABLET, FILM COATED ORAL at 21:09

## 2024-11-27 RX ADMIN — APIXABAN 2.5 MG: 2.5 TABLET, FILM COATED ORAL at 21:08

## 2024-11-27 RX ADMIN — MIDODRINE HYDROCHLORIDE 5 MG: 5 TABLET ORAL at 06:40

## 2024-11-27 RX ADMIN — MIDODRINE HYDROCHLORIDE 5 MG: 5 TABLET ORAL at 17:51

## 2024-11-27 RX ADMIN — ROSUVASTATIN CALCIUM 10 MG: 5 TABLET, FILM COATED ORAL at 21:08

## 2024-11-27 RX ADMIN — MIDODRINE HYDROCHLORIDE 5 MG: 5 TABLET ORAL at 12:54

## 2024-11-27 RX ADMIN — DIVALPROEX SODIUM 125 MG: 125 CAPSULE, COATED PELLETS ORAL at 06:09

## 2024-11-27 RX ADMIN — DIVALPROEX SODIUM 125 MG: 125 CAPSULE, COATED PELLETS ORAL at 21:08

## 2024-11-27 ASSESSMENT — PAIN SCALES - WONG BAKER: WONGBAKER_NUMERICALRESPONSE: NO HURT

## 2024-11-27 NOTE — FLOWSHEET NOTE
11/27/24 1020   Vital Signs   BP (!) 105/45   Temp 96.9 °F (36.1 °C)   Pulse 61   Respirations 18   Weight - Scale 67 kg (147 lb 11.3 oz)   Weight Method Bed scale   Percent Weight Change -1.47   Pain Assessment   Pain Assessment None - Denies Pain   Post-Hemodialysis Assessment   Post-Treatment Procedures Blood returned;Access bleeding time < 10 minutes   Machine Disinfection Process Exterior Machine Disinfection   Rinseback Volume (ml) 300 ml   Blood Volume Processed (Liters) 49.5 L   Dialyzer Clearance Lightly streaked   Duration of Treatment (minutes) 180 minutes   Heparin Amount Administered During Treatment (mL) 0 mL   Hemodialysis Intake (ml) 300 ml   Hemodialysis Output (ml) 1500 ml   NET Removed (ml) 1200   Tolerated Treatment Good   Patient Response to Treatment tolerated well   Bilateral Breath Sounds Diminished   Edema None   Physician Notified No   Time Off 1016   Patient Disposition Return to room   Observations & Evaluations   Level of Consciousness 0
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
## 2024-11-27 NOTE — NURSE NAVIGATOR
ARRHYTHMIA EDUCATION     Met with patient and his wife to review information relating to SND & PPM Insertion.     Discussed the following topics: The Heart's Electrical System, SND , PPM, Post Operative Care of PPM,  Arm Restrictions,  & PPM/Linq explant DC Instructions     Handouts given on above topics given & questions answered. Patient verbalized understanding.    Mr Thompson was sleeping for my visit, I spoke primarily to his wife. Pt needed a different fu appt scheduled so I contacted the office for a different date as he has HD on T-TH. Pt had some blood on his sling I got a new sling and gave it to wife for him to have and go home with.     Time spent with patient: 20 minutes.
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
## 2024-11-28 LAB
ALBUMIN SERPL-MCNC: 3.1 G/DL (ref 3.5–5.2)
ALP SERPL-CCNC: 73 U/L (ref 40–129)
ALT SERPL-CCNC: 12 U/L (ref 0–40)
AMMONIA PLAS-SCNC: 13 UMOL/L (ref 16–60)
ANION GAP SERPL CALCULATED.3IONS-SCNC: 14 MMOL/L (ref 7–16)
AST SERPL-CCNC: 20 U/L (ref 0–39)
BILIRUB SERPL-MCNC: 0.2 MG/DL (ref 0–1.2)
BUN SERPL-MCNC: 32 MG/DL (ref 6–23)
CALCIUM SERPL-MCNC: 8.3 MG/DL (ref 8.6–10.2)
CHLORIDE SERPL-SCNC: 94 MMOL/L (ref 98–107)
CO2 SERPL-SCNC: 28 MMOL/L (ref 22–29)
CREAT SERPL-MCNC: 5.4 MG/DL (ref 0.7–1.2)
GFR, ESTIMATED: 10 ML/MIN/1.73M2
GLUCOSE BLD-MCNC: 108 MG/DL (ref 74–99)
GLUCOSE BLD-MCNC: 166 MG/DL (ref 74–99)
GLUCOSE SERPL-MCNC: 79 MG/DL (ref 74–99)
MAGNESIUM SERPL-MCNC: 2.2 MG/DL (ref 1.6–2.6)
PHOSPHATE SERPL-MCNC: 4.3 MG/DL (ref 2.5–4.5)
POTASSIUM SERPL-SCNC: 4.2 MMOL/L (ref 3.5–5)
PROT SERPL-MCNC: 5.6 G/DL (ref 6.4–8.3)
SODIUM SERPL-SCNC: 136 MMOL/L (ref 132–146)

## 2024-11-28 PROCEDURE — 6370000000 HC RX 637 (ALT 250 FOR IP): Performed by: STUDENT IN AN ORGANIZED HEALTH CARE EDUCATION/TRAINING PROGRAM

## 2024-11-28 PROCEDURE — 6370000000 HC RX 637 (ALT 250 FOR IP): Performed by: INTERNAL MEDICINE

## 2024-11-28 PROCEDURE — 80053 COMPREHEN METABOLIC PANEL: CPT

## 2024-11-28 PROCEDURE — 82947 ASSAY GLUCOSE BLOOD QUANT: CPT

## 2024-11-28 PROCEDURE — 99233 SBSQ HOSP IP/OBS HIGH 50: CPT | Performed by: STUDENT IN AN ORGANIZED HEALTH CARE EDUCATION/TRAINING PROGRAM

## 2024-11-28 PROCEDURE — 36415 COLL VENOUS BLD VENIPUNCTURE: CPT

## 2024-11-28 PROCEDURE — 2700000000 HC OXYGEN THERAPY PER DAY

## 2024-11-28 PROCEDURE — 2140000000 HC CCU INTERMEDIATE R&B

## 2024-11-28 PROCEDURE — 84100 ASSAY OF PHOSPHORUS: CPT

## 2024-11-28 PROCEDURE — 83735 ASSAY OF MAGNESIUM: CPT

## 2024-11-28 PROCEDURE — 82140 ASSAY OF AMMONIA: CPT

## 2024-11-28 RX ORDER — DIVALPROEX SODIUM 125 MG/1
250 CAPSULE, COATED PELLETS ORAL EVERY 8 HOURS SCHEDULED
Status: DISCONTINUED | OUTPATIENT
Start: 2024-11-28 | End: 2024-12-06 | Stop reason: HOSPADM

## 2024-11-28 RX ADMIN — ANORECTAL OINTMENT: 15.7; .44; 24; 20.6 OINTMENT TOPICAL at 09:02

## 2024-11-28 RX ADMIN — ROSUVASTATIN CALCIUM 10 MG: 5 TABLET, FILM COATED ORAL at 21:24

## 2024-11-28 RX ADMIN — Medication 5 MG: at 21:25

## 2024-11-28 RX ADMIN — MIDODRINE HYDROCHLORIDE 5 MG: 5 TABLET ORAL at 12:11

## 2024-11-28 RX ADMIN — ANORECTAL OINTMENT: 15.7; .44; 24; 20.6 OINTMENT TOPICAL at 21:28

## 2024-11-28 RX ADMIN — MIRTAZAPINE 7.5 MG: 15 TABLET, FILM COATED ORAL at 21:24

## 2024-11-28 RX ADMIN — APIXABAN 2.5 MG: 2.5 TABLET, FILM COATED ORAL at 21:24

## 2024-11-28 RX ADMIN — MIDODRINE HYDROCHLORIDE 5 MG: 5 TABLET ORAL at 17:23

## 2024-11-28 RX ADMIN — DIVALPROEX SODIUM 250 MG: 125 CAPSULE, COATED PELLETS ORAL at 21:24

## 2024-11-28 RX ADMIN — DIVALPROEX SODIUM 125 MG: 125 CAPSULE, COATED PELLETS ORAL at 06:12

## 2024-11-28 RX ADMIN — DIVALPROEX SODIUM 250 MG: 125 CAPSULE, COATED PELLETS ORAL at 14:01

## 2024-11-28 ASSESSMENT — PAIN SCALES - WONG BAKER: WONGBAKER_NUMERICALRESPONSE: NO HURT

## 2024-11-29 LAB
GLUCOSE BLD-MCNC: 116 MG/DL (ref 74–99)
GLUCOSE BLD-MCNC: 88 MG/DL (ref 74–99)

## 2024-11-29 PROCEDURE — 2140000000 HC CCU INTERMEDIATE R&B

## 2024-11-29 PROCEDURE — 6370000000 HC RX 637 (ALT 250 FOR IP): Performed by: STUDENT IN AN ORGANIZED HEALTH CARE EDUCATION/TRAINING PROGRAM

## 2024-11-29 PROCEDURE — 6360000002 HC RX W HCPCS: Performed by: STUDENT IN AN ORGANIZED HEALTH CARE EDUCATION/TRAINING PROGRAM

## 2024-11-29 PROCEDURE — 99232 SBSQ HOSP IP/OBS MODERATE 35: CPT | Performed by: STUDENT IN AN ORGANIZED HEALTH CARE EDUCATION/TRAINING PROGRAM

## 2024-11-29 PROCEDURE — 2700000000 HC OXYGEN THERAPY PER DAY

## 2024-11-29 PROCEDURE — 6370000000 HC RX 637 (ALT 250 FOR IP): Performed by: INTERNAL MEDICINE

## 2024-11-29 PROCEDURE — 82947 ASSAY GLUCOSE BLOOD QUANT: CPT

## 2024-11-29 PROCEDURE — 2580000003 HC RX 258: Performed by: STUDENT IN AN ORGANIZED HEALTH CARE EDUCATION/TRAINING PROGRAM

## 2024-11-29 RX ORDER — ZOLPIDEM TARTRATE 5 MG/1
5 TABLET ORAL NIGHTLY
Status: DISCONTINUED | OUTPATIENT
Start: 2024-11-29 | End: 2024-11-30

## 2024-11-29 RX ADMIN — DIVALPROEX SODIUM 250 MG: 125 CAPSULE, COATED PELLETS ORAL at 05:57

## 2024-11-29 RX ADMIN — ZOLPIDEM TARTRATE 5 MG: 5 TABLET ORAL at 22:40

## 2024-11-29 RX ADMIN — APIXABAN 2.5 MG: 2.5 TABLET, FILM COATED ORAL at 09:15

## 2024-11-29 RX ADMIN — ALLOPURINOL 100 MG: 100 TABLET ORAL at 09:15

## 2024-11-29 RX ADMIN — MIDODRINE HYDROCHLORIDE 5 MG: 5 TABLET ORAL at 09:15

## 2024-11-29 RX ADMIN — ANORECTAL OINTMENT: 15.7; .44; 24; 20.6 OINTMENT TOPICAL at 09:16

## 2024-11-29 RX ADMIN — APIXABAN 2.5 MG: 2.5 TABLET, FILM COATED ORAL at 22:41

## 2024-11-29 RX ADMIN — Medication 5 MG: at 22:41

## 2024-11-29 RX ADMIN — Medication 1 CAPSULE: at 09:15

## 2024-11-29 RX ADMIN — ANORECTAL OINTMENT: 15.7; .44; 24; 20.6 OINTMENT TOPICAL at 22:51

## 2024-11-29 RX ADMIN — DIVALPROEX SODIUM 250 MG: 125 CAPSULE, COATED PELLETS ORAL at 12:15

## 2024-11-29 RX ADMIN — DIVALPROEX SODIUM 250 MG: 125 CAPSULE, COATED PELLETS ORAL at 22:42

## 2024-11-29 RX ADMIN — ROSUVASTATIN CALCIUM 10 MG: 5 TABLET, FILM COATED ORAL at 22:41

## 2024-11-29 RX ADMIN — MIDODRINE HYDROCHLORIDE 5 MG: 5 TABLET ORAL at 12:15

## 2024-11-29 RX ADMIN — ZIPRASIDONE MESYLATE 10 MG: 20 INJECTION, POWDER, LYOPHILIZED, FOR SOLUTION INTRAMUSCULAR at 01:18

## 2024-11-30 LAB
DATE LAST DOSE: ABNORMAL
ERYTHROCYTE [DISTWIDTH] IN BLOOD BY AUTOMATED COUNT: 14.3 % (ref 11.5–15)
GLUCOSE BLD-MCNC: 80 MG/DL (ref 74–99)
GLUCOSE BLD-MCNC: 82 MG/DL (ref 74–99)
GLUCOSE BLD-MCNC: 92 MG/DL (ref 74–99)
HCT VFR BLD AUTO: 27.9 % (ref 37–54)
HGB BLD-MCNC: 8.6 G/DL (ref 12.5–16.5)
MCH RBC QN AUTO: 31 PG (ref 26–35)
MCHC RBC AUTO-ENTMCNC: 30.8 G/DL (ref 32–34.5)
MCV RBC AUTO: 100.7 FL (ref 80–99.9)
PLATELET # BLD AUTO: 203 K/UL (ref 130–450)
PMV BLD AUTO: 10.1 FL (ref 7–12)
RBC # BLD AUTO: 2.77 M/UL (ref 3.8–5.8)
TME LAST DOSE: ABNORMAL H
VALPROATE SERPL-MCNC: 35 UG/ML (ref 50–100)
VANCOMYCIN DOSE: ABNORMAL MG
WBC OTHER # BLD: 7.9 K/UL (ref 4.5–11.5)

## 2024-11-30 PROCEDURE — 6370000000 HC RX 637 (ALT 250 FOR IP): Performed by: STUDENT IN AN ORGANIZED HEALTH CARE EDUCATION/TRAINING PROGRAM

## 2024-11-30 PROCEDURE — 2140000000 HC CCU INTERMEDIATE R&B

## 2024-11-30 PROCEDURE — 36415 COLL VENOUS BLD VENIPUNCTURE: CPT

## 2024-11-30 PROCEDURE — 85027 COMPLETE CBC AUTOMATED: CPT

## 2024-11-30 PROCEDURE — 99232 SBSQ HOSP IP/OBS MODERATE 35: CPT | Performed by: STUDENT IN AN ORGANIZED HEALTH CARE EDUCATION/TRAINING PROGRAM

## 2024-11-30 PROCEDURE — 80164 ASSAY DIPROPYLACETIC ACD TOT: CPT

## 2024-11-30 PROCEDURE — 90935 HEMODIALYSIS ONE EVALUATION: CPT

## 2024-11-30 PROCEDURE — 2700000000 HC OXYGEN THERAPY PER DAY

## 2024-11-30 PROCEDURE — 6370000000 HC RX 637 (ALT 250 FOR IP): Performed by: INTERNAL MEDICINE

## 2024-11-30 PROCEDURE — 82947 ASSAY GLUCOSE BLOOD QUANT: CPT

## 2024-11-30 RX ORDER — ZOLPIDEM TARTRATE 5 MG/1
2.5 TABLET ORAL NIGHTLY
Status: DISCONTINUED | OUTPATIENT
Start: 2024-11-30 | End: 2024-12-01

## 2024-11-30 RX ADMIN — MIDODRINE HYDROCHLORIDE 5 MG: 5 TABLET ORAL at 12:43

## 2024-11-30 RX ADMIN — APIXABAN 2.5 MG: 2.5 TABLET, FILM COATED ORAL at 07:51

## 2024-11-30 RX ADMIN — DIVALPROEX SODIUM 250 MG: 125 CAPSULE, COATED PELLETS ORAL at 06:34

## 2024-11-30 RX ADMIN — Medication 1 CAPSULE: at 07:51

## 2024-11-30 RX ADMIN — ANORECTAL OINTMENT: 15.7; .44; 24; 20.6 OINTMENT TOPICAL at 07:54

## 2024-11-30 RX ADMIN — DIVALPROEX SODIUM 250 MG: 125 CAPSULE, COATED PELLETS ORAL at 21:38

## 2024-11-30 RX ADMIN — MIDODRINE HYDROCHLORIDE 5 MG: 5 TABLET ORAL at 07:51

## 2024-11-30 RX ADMIN — ROSUVASTATIN CALCIUM 10 MG: 5 TABLET, FILM COATED ORAL at 21:37

## 2024-11-30 RX ADMIN — MIRTAZAPINE 7.5 MG: 15 TABLET, FILM COATED ORAL at 21:37

## 2024-11-30 RX ADMIN — ANORECTAL OINTMENT: 15.7; .44; 24; 20.6 OINTMENT TOPICAL at 21:38

## 2024-11-30 RX ADMIN — APIXABAN 2.5 MG: 2.5 TABLET, FILM COATED ORAL at 21:38

## 2024-11-30 RX ADMIN — ALLOPURINOL 100 MG: 100 TABLET ORAL at 07:51

## 2024-11-30 RX ADMIN — DIVALPROEX SODIUM 250 MG: 125 CAPSULE, COATED PELLETS ORAL at 13:58

## 2024-11-30 ASSESSMENT — PAIN SCALES - GENERAL
PAINLEVEL_OUTOF10: 0

## 2024-11-30 NOTE — FLOWSHEET NOTE
11/30/24 1153   Vital Signs   BP (!) 101/51   Temp 97.6 °F (36.4 °C)   Pulse 66   Respirations 16   Weight - Scale 68.5 kg (151 lb 0.2 oz)   Weight Method Bed scale   Percent Weight Change 2.24   Post-Hemodialysis Assessment   Post-Treatment Procedures Blood returned;Access bleeding time < 10 minutes   Machine Disinfection Process Exterior Machine Disinfection   Rinseback Volume (ml) 300 ml   Blood Volume Processed (Liters) 50.8 L   Dialyzer Clearance Moderately streaked   Duration of Treatment (minutes) 180 minutes   Heparin Amount Administered During Treatment (mL) 0 mL   Hemodialysis Intake (ml) 300 ml   Hemodialysis Output (ml) 1300 ml   NET Removed (ml) 1000   Tolerated Treatment Good   Patient Response to Treatment tolerated tx well, 1L fluid removed w/o diff   Bilateral Breath Sounds Diminished   Edema None   Time Off 1143   Patient Disposition Return to room   Observations & Evaluations   Level of Consciousness 1   Heart Rhythm Regular   Respiratory Quality/Effort Unlabored   O2 Device Nasal cannula   Skin Condition/Temp Dry;Warm     Tolerated  HD 3 hr on 3 k 2.5 ca bath, 1000 ml removed with out difficulty.  Needles removed and pressure held until hemostasis achieved and dressing applied. Pt vitals stable at time of transport, report to floor RN, patient remains in care of staff.

## 2024-12-01 ENCOUNTER — APPOINTMENT (OUTPATIENT)
Dept: CT IMAGING | Age: 79
DRG: 242 | End: 2024-12-01
Attending: PSYCHIATRY & NEUROLOGY
Payer: MEDICARE

## 2024-12-01 ENCOUNTER — APPOINTMENT (OUTPATIENT)
Dept: CT IMAGING | Age: 79
DRG: 242 | End: 2024-12-01
Attending: STUDENT IN AN ORGANIZED HEALTH CARE EDUCATION/TRAINING PROGRAM
Payer: MEDICARE

## 2024-12-01 PROBLEM — I95.9 HYPOTENSION: Status: ACTIVE | Noted: 2024-12-01

## 2024-12-01 PROBLEM — F03.90 DEMENTIA (HCC): Status: ACTIVE | Noted: 2024-12-01

## 2024-12-01 PROBLEM — R41.0 DELIRIUM: Status: ACTIVE | Noted: 2024-12-01

## 2024-12-01 PROBLEM — I67.9: Status: ACTIVE | Noted: 2024-12-01

## 2024-12-01 PROBLEM — G93.1 ANOXIC ENCEPHALOPATHY (HCC): Status: ACTIVE | Noted: 2024-12-01

## 2024-12-01 LAB
ALBUMIN SERPL-MCNC: 2.8 G/DL (ref 3.5–5.2)
ALP SERPL-CCNC: 70 U/L (ref 40–129)
ALT SERPL-CCNC: 8 U/L (ref 0–40)
ANION GAP SERPL CALCULATED.3IONS-SCNC: 9 MMOL/L (ref 7–16)
AST SERPL-CCNC: 15 U/L (ref 0–39)
B.E.: 5.4 MMOL/L (ref -3–3)
BACTERIA URNS QL MICRO: ABNORMAL
BILIRUB SERPL-MCNC: 0.2 MG/DL (ref 0–1.2)
BILIRUB UR QL STRIP: NEGATIVE
BUN SERPL-MCNC: 35 MG/DL (ref 6–23)
CALCIUM SERPL-MCNC: 8.2 MG/DL (ref 8.6–10.2)
CHLORIDE SERPL-SCNC: 98 MMOL/L (ref 98–107)
CLARITY UR: ABNORMAL
CO2 SERPL-SCNC: 29 MMOL/L (ref 22–29)
COHB: 0.9 % (ref 0–1.5)
COLOR UR: YELLOW
CREAT SERPL-MCNC: 5.8 MG/DL (ref 0.7–1.2)
CRITICAL: ABNORMAL
DATE ANALYZED: ABNORMAL
DATE OF COLLECTION: ABNORMAL
EPI CELLS #/AREA URNS HPF: ABNORMAL /HPF
ERYTHROCYTE [DISTWIDTH] IN BLOOD BY AUTOMATED COUNT: 14.4 % (ref 11.5–15)
GFR, ESTIMATED: 9 ML/MIN/1.73M2
GLUCOSE BLD-MCNC: 101 MG/DL (ref 74–99)
GLUCOSE BLD-MCNC: 125 MG/DL (ref 74–99)
GLUCOSE BLD-MCNC: 71 MG/DL (ref 74–99)
GLUCOSE BLD-MCNC: 84 MG/DL (ref 74–99)
GLUCOSE BLD-MCNC: 92 MG/DL (ref 74–99)
GLUCOSE SERPL-MCNC: 80 MG/DL (ref 74–99)
GLUCOSE UR STRIP-MCNC: 100 MG/DL
HBA1C MFR BLD: 5.2 % (ref 4–5.6)
HCO3: 29.4 MMOL/L (ref 22–26)
HCT VFR BLD AUTO: 26.8 % (ref 37–54)
HGB BLD-MCNC: 9 G/DL (ref 12.5–16.5)
HGB UR QL STRIP.AUTO: ABNORMAL
HHB: 6.7 % (ref 0–5)
KETONES UR STRIP-MCNC: NEGATIVE MG/DL
LAB: ABNORMAL
LEUKOCYTE ESTERASE UR QL STRIP: NEGATIVE
Lab: 2115
MAGNESIUM SERPL-MCNC: 2.2 MG/DL (ref 1.6–2.6)
MCH RBC QN AUTO: 33.8 PG (ref 26–35)
MCHC RBC AUTO-ENTMCNC: 33.6 G/DL (ref 32–34.5)
MCV RBC AUTO: 100.8 FL (ref 80–99.9)
METHB: 0.3 % (ref 0–1.5)
MODE: ABNORMAL
NITRITE UR QL STRIP: NEGATIVE
O2 SATURATION: 93.2 % (ref 92–98.5)
O2HB: 92.1 % (ref 94–97)
OPERATOR ID: 5134
PATIENT TEMP: 37 C
PCO2: 41 MMHG (ref 35–45)
PH BLOOD GAS: 7.47 (ref 7.35–7.45)
PH UR STRIP: 8 [PH] (ref 5–9)
PHOSPHATE SERPL-MCNC: 4.9 MG/DL (ref 2.5–4.5)
PLATELET # BLD AUTO: 212 K/UL (ref 130–450)
PMV BLD AUTO: 10 FL (ref 7–12)
PO2: 67.7 MMHG (ref 75–100)
POTASSIUM SERPL-SCNC: 4.4 MMOL/L (ref 3.5–5)
PROT SERPL-MCNC: 5.3 G/DL (ref 6.4–8.3)
PROT UR STRIP-MCNC: 100 MG/DL
RBC # BLD AUTO: 2.66 M/UL (ref 3.8–5.8)
RBC #/AREA URNS HPF: ABNORMAL /HPF
SODIUM SERPL-SCNC: 136 MMOL/L (ref 132–146)
SOURCE, BLOOD GAS: ABNORMAL
SP GR UR STRIP: 1.01 (ref 1–1.03)
THB: 9.3 G/DL (ref 11.5–16.5)
TIME ANALYZED: 2221
UROBILINOGEN UR STRIP-ACNC: 0.2 EU/DL (ref 0–1)
WBC #/AREA URNS HPF: ABNORMAL /HPF
WBC OTHER # BLD: 6.5 K/UL (ref 4.5–11.5)

## 2024-12-01 PROCEDURE — 6370000000 HC RX 637 (ALT 250 FOR IP): Performed by: STUDENT IN AN ORGANIZED HEALTH CARE EDUCATION/TRAINING PROGRAM

## 2024-12-01 PROCEDURE — 6360000002 HC RX W HCPCS: Performed by: STUDENT IN AN ORGANIZED HEALTH CARE EDUCATION/TRAINING PROGRAM

## 2024-12-01 PROCEDURE — 82947 ASSAY GLUCOSE BLOOD QUANT: CPT

## 2024-12-01 PROCEDURE — 81001 URINALYSIS AUTO W/SCOPE: CPT

## 2024-12-01 PROCEDURE — 87086 URINE CULTURE/COLONY COUNT: CPT

## 2024-12-01 PROCEDURE — 2700000000 HC OXYGEN THERAPY PER DAY

## 2024-12-01 PROCEDURE — 36415 COLL VENOUS BLD VENIPUNCTURE: CPT

## 2024-12-01 PROCEDURE — 84100 ASSAY OF PHOSPHORUS: CPT

## 2024-12-01 PROCEDURE — 85027 COMPLETE CBC AUTOMATED: CPT

## 2024-12-01 PROCEDURE — 83735 ASSAY OF MAGNESIUM: CPT

## 2024-12-01 PROCEDURE — 6370000000 HC RX 637 (ALT 250 FOR IP): Performed by: INTERNAL MEDICINE

## 2024-12-01 PROCEDURE — 87040 BLOOD CULTURE FOR BACTERIA: CPT

## 2024-12-01 PROCEDURE — 82085 ASSAY OF ALDOLASE: CPT

## 2024-12-01 PROCEDURE — 99221 1ST HOSP IP/OBS SF/LOW 40: CPT

## 2024-12-01 PROCEDURE — 2580000003 HC RX 258: Performed by: STUDENT IN AN ORGANIZED HEALTH CARE EDUCATION/TRAINING PROGRAM

## 2024-12-01 PROCEDURE — 2140000000 HC CCU INTERMEDIATE R&B

## 2024-12-01 PROCEDURE — 84425 ASSAY OF VITAMIN B-1: CPT

## 2024-12-01 PROCEDURE — 2580000003 HC RX 258: Performed by: INTERNAL MEDICINE

## 2024-12-01 PROCEDURE — 99222 1ST HOSP IP/OBS MODERATE 55: CPT | Performed by: PSYCHIATRY & NEUROLOGY

## 2024-12-01 PROCEDURE — 83036 HEMOGLOBIN GLYCOSYLATED A1C: CPT

## 2024-12-01 PROCEDURE — 84207 ASSAY OF VITAMIN B-6: CPT

## 2024-12-01 PROCEDURE — 71250 CT THORAX DX C-: CPT

## 2024-12-01 PROCEDURE — 72125 CT NECK SPINE W/O DYE: CPT

## 2024-12-01 PROCEDURE — 70450 CT HEAD/BRAIN W/O DYE: CPT

## 2024-12-01 PROCEDURE — 6360000002 HC RX W HCPCS: Performed by: PSYCHIATRY & NEUROLOGY

## 2024-12-01 PROCEDURE — 82805 BLOOD GASES W/O2 SATURATION: CPT

## 2024-12-01 PROCEDURE — 99232 SBSQ HOSP IP/OBS MODERATE 35: CPT | Performed by: STUDENT IN AN ORGANIZED HEALTH CARE EDUCATION/TRAINING PROGRAM

## 2024-12-01 PROCEDURE — 80053 COMPREHEN METABOLIC PANEL: CPT

## 2024-12-01 RX ORDER — LORAZEPAM 2 MG/ML
0.25 INJECTION INTRAMUSCULAR ONCE
Status: COMPLETED | OUTPATIENT
Start: 2024-12-01 | End: 2024-12-01

## 2024-12-01 RX ORDER — QUETIAPINE FUMARATE 25 MG/1
12.5 TABLET, FILM COATED ORAL NIGHTLY
Status: DISCONTINUED | OUTPATIENT
Start: 2024-12-01 | End: 2024-12-06 | Stop reason: HOSPADM

## 2024-12-01 RX ORDER — LORAZEPAM 2 MG/ML
1 INJECTION INTRAMUSCULAR EVERY 6 HOURS PRN
Status: DISCONTINUED | OUTPATIENT
Start: 2024-12-01 | End: 2024-12-06 | Stop reason: HOSPADM

## 2024-12-01 RX ADMIN — APIXABAN 2.5 MG: 2.5 TABLET, FILM COATED ORAL at 22:27

## 2024-12-01 RX ADMIN — DIVALPROEX SODIUM 250 MG: 125 CAPSULE, COATED PELLETS ORAL at 05:39

## 2024-12-01 RX ADMIN — SODIUM CHLORIDE, PRESERVATIVE FREE 10 ML: 5 INJECTION INTRAVENOUS at 21:50

## 2024-12-01 RX ADMIN — LORAZEPAM 1 MG: 2 INJECTION INTRAMUSCULAR; INTRAVENOUS at 21:50

## 2024-12-01 RX ADMIN — MIDODRINE HYDROCHLORIDE 5 MG: 5 TABLET ORAL at 11:35

## 2024-12-01 RX ADMIN — MIDODRINE HYDROCHLORIDE 5 MG: 5 TABLET ORAL at 08:28

## 2024-12-01 RX ADMIN — ANORECTAL OINTMENT: 15.7; .44; 24; 20.6 OINTMENT TOPICAL at 08:31

## 2024-12-01 RX ADMIN — APIXABAN 2.5 MG: 2.5 TABLET, FILM COATED ORAL at 08:28

## 2024-12-01 RX ADMIN — ZIPRASIDONE MESYLATE 10 MG: 20 INJECTION, POWDER, LYOPHILIZED, FOR SOLUTION INTRAMUSCULAR at 15:10

## 2024-12-01 RX ADMIN — LORAZEPAM 0.26 MG: 2 INJECTION INTRAMUSCULAR; INTRAVENOUS at 12:07

## 2024-12-01 RX ADMIN — Medication 1 CAPSULE: at 08:30

## 2024-12-01 RX ADMIN — ALLOPURINOL 100 MG: 100 TABLET ORAL at 08:30

## 2024-12-01 ASSESSMENT — PAIN SCALES - GENERAL
PAINLEVEL_OUTOF10: 0
PAINLEVEL_OUTOF10: 0

## 2024-12-02 ENCOUNTER — APPOINTMENT (OUTPATIENT)
Dept: ULTRASOUND IMAGING | Age: 79
DRG: 242 | End: 2024-12-02
Payer: MEDICARE

## 2024-12-02 PROBLEM — J69.0 PNEUMONITIS, ASPIRATION (HCC): Status: ACTIVE | Noted: 2024-12-02

## 2024-12-02 LAB
ALBUMIN SERPL-MCNC: 3.1 G/DL (ref 3.5–5.2)
ALP SERPL-CCNC: 77 U/L (ref 40–129)
ALT SERPL-CCNC: 9 U/L (ref 0–40)
ANION GAP SERPL CALCULATED.3IONS-SCNC: 13 MMOL/L (ref 7–16)
AST SERPL-CCNC: 19 U/L (ref 0–39)
BILIRUB SERPL-MCNC: 0.3 MG/DL (ref 0–1.2)
BUN SERPL-MCNC: 60 MG/DL (ref 6–23)
CALCIUM SERPL-MCNC: 8.2 MG/DL (ref 8.6–10.2)
CHLORIDE SERPL-SCNC: 95 MMOL/L (ref 98–107)
CHOLEST SERPL-MCNC: 96 MG/DL
CO2 SERPL-SCNC: 29 MMOL/L (ref 22–29)
CREAT SERPL-MCNC: 7.4 MG/DL (ref 0.7–1.2)
ERYTHROCYTE [DISTWIDTH] IN BLOOD BY AUTOMATED COUNT: 14.6 % (ref 11.5–15)
ERYTHROCYTE [SEDIMENTATION RATE] IN BLOOD BY WESTERGREN METHOD: 42 MM/HR (ref 0–15)
GFR, ESTIMATED: 7 ML/MIN/1.73M2
GLUCOSE BLD-MCNC: 113 MG/DL (ref 74–99)
GLUCOSE BLD-MCNC: 76 MG/DL (ref 74–99)
GLUCOSE BLD-MCNC: 78 MG/DL (ref 74–99)
GLUCOSE BLD-MCNC: 89 MG/DL (ref 74–99)
GLUCOSE BLD-MCNC: 96 MG/DL (ref 74–99)
GLUCOSE SERPL-MCNC: 85 MG/DL (ref 74–99)
HCT VFR BLD AUTO: 26.9 % (ref 37–54)
HDLC SERPL-MCNC: 32 MG/DL
HGB BLD-MCNC: 8.4 G/DL (ref 12.5–16.5)
LDLC SERPL CALC-MCNC: 53 MG/DL
MAGNESIUM SERPL-MCNC: 2.5 MG/DL (ref 1.6–2.6)
MCH RBC QN AUTO: 31.3 PG (ref 26–35)
MCHC RBC AUTO-ENTMCNC: 31.2 G/DL (ref 32–34.5)
MCV RBC AUTO: 100.4 FL (ref 80–99.9)
PHOSPHATE SERPL-MCNC: 5.4 MG/DL (ref 2.5–4.5)
PLATELET # BLD AUTO: 213 K/UL (ref 130–450)
PMV BLD AUTO: 10 FL (ref 7–12)
POTASSIUM SERPL-SCNC: 5 MMOL/L (ref 3.5–5)
PROT SERPL-MCNC: 5.6 G/DL (ref 6.4–8.3)
RBC # BLD AUTO: 2.68 M/UL (ref 3.8–5.8)
SODIUM SERPL-SCNC: 137 MMOL/L (ref 132–146)
TRIGL SERPL-MCNC: 53 MG/DL
VLDLC SERPL CALC-MCNC: 11 MG/DL
WBC OTHER # BLD: 7.4 K/UL (ref 4.5–11.5)

## 2024-12-02 PROCEDURE — 99232 SBSQ HOSP IP/OBS MODERATE 35: CPT | Performed by: STUDENT IN AN ORGANIZED HEALTH CARE EDUCATION/TRAINING PROGRAM

## 2024-12-02 PROCEDURE — 80061 LIPID PANEL: CPT

## 2024-12-02 PROCEDURE — 97530 THERAPEUTIC ACTIVITIES: CPT

## 2024-12-02 PROCEDURE — 80053 COMPREHEN METABOLIC PANEL: CPT

## 2024-12-02 PROCEDURE — 82947 ASSAY GLUCOSE BLOOD QUANT: CPT

## 2024-12-02 PROCEDURE — 2140000000 HC CCU INTERMEDIATE R&B

## 2024-12-02 PROCEDURE — 85652 RBC SED RATE AUTOMATED: CPT

## 2024-12-02 PROCEDURE — 97165 OT EVAL LOW COMPLEX 30 MIN: CPT

## 2024-12-02 PROCEDURE — 97161 PT EVAL LOW COMPLEX 20 MIN: CPT

## 2024-12-02 PROCEDURE — 83735 ASSAY OF MAGNESIUM: CPT

## 2024-12-02 PROCEDURE — 99232 SBSQ HOSP IP/OBS MODERATE 35: CPT | Performed by: NURSE PRACTITIONER

## 2024-12-02 PROCEDURE — 85027 COMPLETE CBC AUTOMATED: CPT

## 2024-12-02 PROCEDURE — 2700000000 HC OXYGEN THERAPY PER DAY

## 2024-12-02 PROCEDURE — 84100 ASSAY OF PHOSPHORUS: CPT

## 2024-12-02 PROCEDURE — 36415 COLL VENOUS BLD VENIPUNCTURE: CPT

## 2024-12-02 RX ADMIN — ANORECTAL OINTMENT: 15.7; .44; 24; 20.6 OINTMENT TOPICAL at 20:42

## 2024-12-02 RX ADMIN — ANORECTAL OINTMENT: 15.7; .44; 24; 20.6 OINTMENT TOPICAL at 13:04

## 2024-12-02 ASSESSMENT — PAIN SCALES - WONG BAKER
WONGBAKER_NUMERICALRESPONSE: NO HURT

## 2024-12-02 NOTE — CARE COORDINATION
Patient on 2L NC, sitter at bedside, given Geodon overnight, depakote increased to (TID), Ambien discontinued and switched to Seroquel; psychiatry signed off and neurology following. Patient pending a MRI of the brain, EEG, CT of the spine, US of carotid, urine culture pending and CT of the chest showed mild aspiration pneumonia and small bilateral pleural effusions. [facility] following. Call made to patient's wife, June for alternate JOHNY choices, no answer; left message to return the call. Pt is ESRD and active with [facility]; chair time 11:35A.    1:30P  Received return call from patient's wife. Discussed additional JOHNY choices being needed and the importance of having a plan in place. Patient's wife expressed understanding, would like [facility] and [facility] [place]. Referral made to [facility]; she will review and follow-up regarding acceptance.    Electronically signed by SHANNON Sultana on 12/2/2024 at 11:33 AM

## 2024-12-02 NOTE — CONSULTS
[facility]   Division of Pulmonary, Critical Care and Sleep Medicine  H&P Note    Gabriele Scott MD, MS    Patient: Cleveland Thompson  MRN: [id number]  : 1945    Encounter Time: 12:18 PM     Date of Admission: 2024  5:16 PM    Primary Care Physician: Rafal Weber MD    Reason for ADMISSION: Bradycardia     HISTORY OF PRESENT ILLNESS : Cleveland Thompson 79 y.o. male was seen in consultation regarding the above chief compliant.    79-year-old with history of ESRD on hemodialysis, AAA, DVT on Eliquis, chronic C4 fracture with neck collar, presenting with bradycardia and hypotension.    Patient initially presented to the vascular access center due to hemodialysis access issues when he was found to be bradycardic and hypotensive and brought to the ER where he received 1 L normal saline and 10 mg midodrine with improvement of blood pressure but heart rate still in the 30s.    EP consulted for possible pacemaker placement.    Currently patient is awake and alert and denies any chest pain or shortness of breath or lightheadedness.  He had prior falls with rib fractures and cervical fracture has cervical collar.    PAST MEDICAL HISTORY:  has a past medical history of Aneurysm of abdominal aorta (HCC), Deep vein thrombosis of calf (HCC), Gout, History of DVT (deep vein thrombosis), History of tobacco use, Hypertension, Iliac artery aneurysm, bilateral (HCC), Loss of weight, and Right leg DVT (HCC).    SURGICAL HISTORY:  has a past surgical history that includes hernia repair; Tonsillectomy; Abdominal aortic aneurysm repair; open thoracic aortic aneurysm repair (Bilateral, 2018); vascular surgery (Left, 2023); Dialysis fistula creation (Left, 2023); and ep device procedure (N/A, 2024).     SOCIAL HISTORY:  reports that he quit smoking about 29 years ago. His smoking use included cigarettes. He has never used smokeless tobacco. He reports current alcohol use. He reports that he does not use 
Consult Note            Date:12/1/2024        Patient Name:Cleveland Thompson     YOB: 1945     Age:79 y.o.    Inpatient consult to Neurology  Consult performed by: Grace Manjarrez MD  Consult ordered by: Cole Casillas MD  Reason for consult: delirium, dementia, dysphagia        Chief Complaint     Chief Complaint   Patient presents with    hypotensive    Bradycardia     Patient send in post angioplasty for hypotension and bradycardia. BP 82/39 HR 49      Dementia, brain hypoperfusion    History Obtained From     Medical chart    History of Present Illness     The patient is an elderly frail 79-year-old white gentleman who was brought to the hospital because of bradycardia and hypotension causing him significant confusion at times agitation.    The patient's history was obtained entirely from the medical chart and also from the patient's nurse because he has significant dementia he could not give any information.    The patient has a history of abdominal aortic aneurysm deep vein thrombosis he used to smoke cigarettes for many years he was treated for hypertension but recently he developed hypotension.  The patient lost weight and he is not eating properly he has very poor appetite.  The patient is on hemodialysis 3 times a week.  Recently it was noted that his blood pressure dropped frequently and he became confused    The patient also fell a few times and after evaluation and hospitalization he was sent to the rehabilitation nursing home.  He wears a hard [place] neck collar.    The patient was brought to the hospital on 20 November 2024.  He was scheduled for a fistulogram but during the procedure he became bradycardic and hypotensive.      His nephrologist physician was contacted and the patient was sent to the emergency department.  After evaluation he was admitted to the hospital for further treatment.  There was a concern about his bradycardia and the plan was made to have a pacemaker 
acetaminophen (TYLENOL) 325 MG tablet Take 2 tablets by mouth every 4 hours as needed for Pain 84 tablet 0    rosuvastatin (CRESTOR) 10 MG tablet TAKE ONE TABLET BY MOUTH EVERY EVENING      Apoaequorin (PREVAGEN PO) Take 1 capsule by mouth      apixaban (ELIQUIS) 2.5 MG TABS tablet Take 1 tablet by mouth 2 times daily      Probiotic Product (PROBIOTIC BLEND PO) Take by mouth      allopurinol (ZYLOPRIM) 100 MG tablet Take 1 tablet by mouth daily         Allergies:    Penicillins and Seasonal    Social History:     reports that he quit smoking about 29 years ago. His smoking use included cigarettes. He has never used smokeless tobacco. He reports current alcohol use. He reports that he does not use drugs.    Family History:     No family history on file.    Review of Systems:   See HPI    Physical Exam:      Patient Vitals for the past 24 hrs:   BP Temp Temp src Pulse Resp SpO2 Height Weight   11/21/24 0820 (!) 89/5 97.8 °F (36.6 °C) Oral (!) 40 14 92 % -- --   11/21/24 0803 (!) 89/50 97.8 °F (36.6 °C) Oral (!) 40 -- (!) 75 % -- --   11/21/24 0417 (!) 88/37 97.4 °F (36.3 °C) Oral 51 14 100 % -- --   11/21/24 0005 (!) 112/57 97.9 °F (36.6 °C) Oral (!) 45 18 95 % 1.778 m (5' 10\") 69.3 kg (152 lb 12.5 oz)   11/20/24 2330 (!) 100/51 -- -- (!) 41 18 96 % -- --   11/20/24 2300 (!) 90/43 -- -- (!) 42 15 -- -- --   11/20/24 2230 (!) 99/42 -- -- 51 15 94 % -- --   11/20/24 2130 (!) 88/49 -- -- (!) 41 19 98 % -- --   11/20/24 2100 (!) 99/38 -- -- (!) 36 16 -- -- --   11/20/24 2028 (!) 113/43 -- -- (!) 40 15 97 % -- --   11/20/24 2000 (!) 100/43 -- -- (!) 37 15 97 % -- --   11/20/24 1938 (!) 102/42 -- -- (!) 42 16 93 % -- --   11/20/24 1900 (!) 103/54 -- -- (!) 47 16 -- -- --   11/20/24 1739 (!) 95/39 -- -- (!) 43 19 -- -- --   11/20/24 1724 -- -- -- (!) 41 -- -- -- --   11/20/24 1707 (!) 82/39 96.9 °F (36.1 °C) -- (!) 45 16 92 % -- --       No intake or output data in the 24 hours ending 11/21/24 0927    General: Awake, alert, 
auscultation, no noted aortic bruit, bilateral femoral and pedal pulses are normal in quality  Lungs: clear to auscultation bilaterally, normal respiratory effort without used of accessory muscles, no wheezes  Abdomen: soft, non-tender, bowel sounds present, no masses or hepatomegaly   Extremities: no digital clubbing, no edema   Skin: warm, no rashes   Neuro/Psych: A&O x 1, normal mood and affect    Data:    Recent Labs     11/20/24 1745 11/21/24  0531   WBC 10.7 9.1   HGB 12.0* 9.9*   HCT 39.3 32.8*    147     Recent Labs     11/20/24  1745 11/21/24  0531    139   K 5.0 5.3*   CL 94* 101   CO2 25 21*   BUN 53* 57*   CREATININE 9.3* 9.7*   CALCIUM 9.0 8.6     No results for input(s): \"INR\" in the last 72 hours.  No results for input(s): \"TSH\" in the last 72 hours.  Lab Results   Component Value Date/Time    MG 2.8 11/20/2024 05:45 PM     Telemetry: Junctional escape in the 40's -> sinus at 60-70 bpm     Assessment/plan:  SND/tachy-bradycardia syndrome  - Patient has history of recurrent syncope of sudden onset concerning for arrhythmic etiology.  - Recommend TTE.  - Bedrest.  - Keep pacing pads on patient.  - Start dopamine.  - Hold apixaban.  Last dose was a.m. of 11/20/2024.  - Recommend PPM (I will discuss with established EP regarding preference of right side dc PPM versus Micra). Will plan to remove ILR at time of pacemaker implant. NPO at midnight. AVOID heparin products.    nonvalvular paroxysmal AF chris Parrish (DOI: 1/26/2024-Dr Gonsalves)  - Initially diagnosed in 2018.  - [id number] 4 = (age, PAD).  Recommend OAC in males with score of 1 or more.  DOAC preferred.  - Hold apixaban for pacemaker.  Plan to restart at some point in the future.  - Tachycardia episodes detected on Linq monitor, as well as episodes of sinus arrest with junctional escape rhythm.  Plan to initiate digoxin after pacemaker implant.  Patient likely would not tolerate beta-blocker/CCB due to hypotension despite 
manual manipulation, the device was successfully removed out of the pocket. Hemostasis was achieved with manual pressure. The pocket was flushed with antibiotic solution. The incision was closed in 2 layers including subcutaneous and subcuticular tissues using 3-0 Vicryl and 4-0 Monocryl, respectively. The wound was dressed with steri-strips and an op site dressing. At the end of the case, the patient was hemodynamically stable and under the care of the anesthesiologist, the patient was brought back to the recovery area for post procedural monitoring. ASSESSMENT: 1. Successful implantation of an Abbott dual chamber permanent pacemaker 2. Successful explantation of a LINQ insertable cardiac monitor. RECOMMENDATIONS: 1. Stat portable chest x-ray to rule out pneumothorax and check lead placement now and tomorrow morning. 2. EKG to be performed now. 3. Post-procedural monitoring in the recovery area. 4. The patient will be observed overnight on Telemetry. 5. The patient will receive 24-hours of corazon-operative intravenous antibiotics. 6. The patient's device will be interrogated in the morning by a representative of the device . 7. The patient is to follow-up in device clinic within 2 weeks upon discharge. 8. The patient is advised follow all post-operative device and wound care instructions as per the information provided in the pre-operative clinic. Johnson Gonsalves MD Cardiac Electrophysiology [facility] Gamaliel: Carol Electrophysiology 9:05 AM 11/22/2024     Echo (TTE) complete (PRN contrast/bubble/strain/3D)    Result Date: 11/21/2024    Image quality is technically difficult.   Left Ventricle: Hyperdynamic left ventricular systolic function with a visually estimated EF of 70 - 75%. Left ventricle size is normal. Unable to assess wall thickness. Normal wall motion. Grade II diastolic dysfunction.   Right Ventricle: Right ventricle size is normal. Reduced

## 2024-12-03 ENCOUNTER — APPOINTMENT (OUTPATIENT)
Dept: GENERAL RADIOLOGY | Age: 79
DRG: 242 | End: 2024-12-03
Payer: MEDICARE

## 2024-12-03 ENCOUNTER — APPOINTMENT (OUTPATIENT)
Dept: MRI IMAGING | Age: 79
DRG: 242 | End: 2024-12-03
Payer: MEDICARE

## 2024-12-03 LAB
ALBUMIN SERPL-MCNC: 3.1 G/DL (ref 3.5–5.2)
ALP SERPL-CCNC: 78 U/L (ref 40–129)
ALT SERPL-CCNC: 8 U/L (ref 0–40)
ANION GAP SERPL CALCULATED.3IONS-SCNC: 13 MMOL/L (ref 7–16)
AST SERPL-CCNC: 18 U/L (ref 0–39)
BILIRUB SERPL-MCNC: 0.3 MG/DL (ref 0–1.2)
BUN SERPL-MCNC: 70 MG/DL (ref 6–23)
CALCIUM SERPL-MCNC: 8.5 MG/DL (ref 8.6–10.2)
CHLORIDE SERPL-SCNC: 97 MMOL/L (ref 98–107)
CO2 SERPL-SCNC: 27 MMOL/L (ref 22–29)
CREAT SERPL-MCNC: 9.1 MG/DL (ref 0.7–1.2)
ERYTHROCYTE [DISTWIDTH] IN BLOOD BY AUTOMATED COUNT: 14.4 % (ref 11.5–15)
GFR, ESTIMATED: 5 ML/MIN/1.73M2
GLUCOSE BLD-MCNC: 131 MG/DL (ref 74–99)
GLUCOSE BLD-MCNC: 69 MG/DL (ref 74–99)
GLUCOSE BLD-MCNC: 71 MG/DL (ref 74–99)
GLUCOSE BLD-MCNC: 77 MG/DL (ref 74–99)
GLUCOSE SERPL-MCNC: 64 MG/DL (ref 74–99)
HCT VFR BLD AUTO: 26.4 % (ref 37–54)
HGB BLD-MCNC: 8.4 G/DL (ref 12.5–16.5)
MCH RBC QN AUTO: 31.5 PG (ref 26–35)
MCHC RBC AUTO-ENTMCNC: 31.8 G/DL (ref 32–34.5)
MCV RBC AUTO: 98.9 FL (ref 80–99.9)
MICROORGANISM SPEC CULT: NO GROWTH
PLATELET # BLD AUTO: 207 K/UL (ref 130–450)
PMV BLD AUTO: 9.8 FL (ref 7–12)
POTASSIUM SERPL-SCNC: 5.3 MMOL/L (ref 3.5–5)
PROT SERPL-MCNC: 5.5 G/DL (ref 6.4–8.3)
RBC # BLD AUTO: 2.67 M/UL (ref 3.8–5.8)
SERVICE CMNT-IMP: NORMAL
SODIUM SERPL-SCNC: 137 MMOL/L (ref 132–146)
SPECIMEN DESCRIPTION: NORMAL
WBC OTHER # BLD: 7.2 K/UL (ref 4.5–11.5)

## 2024-12-03 PROCEDURE — 99232 SBSQ HOSP IP/OBS MODERATE 35: CPT | Performed by: INTERNAL MEDICINE

## 2024-12-03 PROCEDURE — 1200000000 HC SEMI PRIVATE

## 2024-12-03 PROCEDURE — 82947 ASSAY GLUCOSE BLOOD QUANT: CPT

## 2024-12-03 PROCEDURE — 80053 COMPREHEN METABOLIC PANEL: CPT

## 2024-12-03 PROCEDURE — 36415 COLL VENOUS BLD VENIPUNCTURE: CPT

## 2024-12-03 PROCEDURE — 92611 MOTION FLUOROSCOPY/SWALLOW: CPT

## 2024-12-03 PROCEDURE — 2700000000 HC OXYGEN THERAPY PER DAY

## 2024-12-03 PROCEDURE — 6370000000 HC RX 637 (ALT 250 FOR IP): Performed by: INTERNAL MEDICINE

## 2024-12-03 PROCEDURE — 90935 HEMODIALYSIS ONE EVALUATION: CPT

## 2024-12-03 PROCEDURE — 2580000003 HC RX 258: Performed by: INTERNAL MEDICINE

## 2024-12-03 PROCEDURE — 70551 MRI BRAIN STEM W/O DYE: CPT

## 2024-12-03 PROCEDURE — 92526 ORAL FUNCTION THERAPY: CPT

## 2024-12-03 PROCEDURE — 2500000003 HC RX 250 WO HCPCS: Performed by: PHYSICIAN ASSISTANT

## 2024-12-03 PROCEDURE — 85027 COMPLETE CBC AUTOMATED: CPT

## 2024-12-03 PROCEDURE — 74230 X-RAY XM SWLNG FUNCJ C+: CPT

## 2024-12-03 RX ADMIN — DEXTROSE MONOHYDRATE 125 ML: 100 INJECTION, SOLUTION INTRAVENOUS at 13:13

## 2024-12-03 RX ADMIN — BARIUM SULFATE 15 ML: 400 PASTE ORAL at 14:11

## 2024-12-03 RX ADMIN — MIDODRINE HYDROCHLORIDE 5 MG: 5 TABLET ORAL at 16:43

## 2024-12-03 RX ADMIN — BARIUM SULFATE 15 ML: 400 SUSPENSION ORAL at 14:11

## 2024-12-03 RX ADMIN — BARIUM SULFATE 15 ML: 0.81 POWDER, FOR SUSPENSION ORAL at 14:12

## 2024-12-03 ASSESSMENT — PAIN SCALES - WONG BAKER: WONGBAKER_NUMERICALRESPONSE: NO HURT

## 2024-12-03 ASSESSMENT — PAIN SCALES - GENERAL: PAINLEVEL_OUTOF10: 0

## 2024-12-03 NOTE — CARE COORDINATION
Patient on 2L NC, k 5.3, MRI of the brain completed today, EEG, US of carotid and modified barium swallow ordered, sitter at bedside. Spoke with Shelby, patient's insurance is out of network for [facility] at [place], may be able to take at [facility] if patient's wife is able to transport to dialysis on Saturday's. Called patient's wife, updated her regarding the facilities, she states she is not able to transport patient due to him no longer being ambulatory. Discussed alternate JOHNY choices needed, choices in order of preference are [facility], [facility] and [facility]; states she is able to pay for people to sit with him while he is at the facility. Referral made to Jelly at [facility], she is unable to accept.    The Plan for Transition of Care is related to the following treatment goals: discharge planning    The Patient and/or patient representative Kathleen Thompson was provided with a choice of provider and agrees   with the discharge plan. [x] Yes [] No    Freedom of choice list was provided with basic dialogue that supports the patient's individualized plan of care/goals, treatment preferences and shares the quality data associated with the providers. [x] Yes [] No     Electronically signed by SHANNON Sultana on 12/3/2024 at 12:51 PM

## 2024-12-03 NOTE — FLOWSHEET NOTE
12/03/24 1011   Vital Signs   BP (!) 144/62   Temp 96.9 °F (36.1 °C)   Pulse 65   Respirations 20   Weight - Scale 64.5 kg (142 lb 3.2 oz)   Percent Weight Change -1.83   Pain Assessment   Pain Assessment None - Denies Pain   Post-Hemodialysis Assessment   Post-Treatment Procedures Blood returned;Access bleeding time < 10 minutes   Machine Disinfection Process Exterior Machine Disinfection   Rinseback Volume (ml) 300 ml   Blood Volume Processed (Liters) 49.4 L   Dialyzer Clearance Lightly streaked   Duration of Treatment (minutes) 180 minutes   Heparin Amount Administered During Treatment (mL) 0 mL   Hemodialysis Intake (ml) 300 ml   Hemodialysis Output (ml) 1300 ml   NET Removed (ml) 1000   Tolerated Treatment Good   Patient Response to Treatment tolerated minimal fluid removal   Bilateral Breath Sounds Diminished   Edema None   Physician Notified No   Time Off 1007   Patient Disposition Return to room   Observations & Evaluations   Level of Consciousness 0

## 2024-12-04 LAB
ALBUMIN SERPL-MCNC: 3.3 G/DL (ref 3.5–5.2)
ALDOLASE SERPL-CCNC: 8.7 U/L (ref 1.2–7.6)
ALP SERPL-CCNC: 76 U/L (ref 40–129)
ALT SERPL-CCNC: 8 U/L (ref 0–40)
ANION GAP SERPL CALCULATED.3IONS-SCNC: 15 MMOL/L (ref 7–16)
AST SERPL-CCNC: 19 U/L (ref 0–39)
BILIRUB SERPL-MCNC: 0.3 MG/DL (ref 0–1.2)
BUN SERPL-MCNC: 34 MG/DL (ref 6–23)
CALCIUM SERPL-MCNC: 8.6 MG/DL (ref 8.6–10.2)
CHLORIDE SERPL-SCNC: 95 MMOL/L (ref 98–107)
CO2 SERPL-SCNC: 28 MMOL/L (ref 22–29)
CREAT SERPL-MCNC: 6.1 MG/DL (ref 0.7–1.2)
ERYTHROCYTE [DISTWIDTH] IN BLOOD BY AUTOMATED COUNT: 14.2 % (ref 11.5–15)
GFR, ESTIMATED: 9 ML/MIN/1.73M2
GLUCOSE BLD-MCNC: 124 MG/DL (ref 74–99)
GLUCOSE BLD-MCNC: 62 MG/DL (ref 74–99)
GLUCOSE BLD-MCNC: 72 MG/DL (ref 74–99)
GLUCOSE BLD-MCNC: 87 MG/DL (ref 74–99)
GLUCOSE BLD-MCNC: 94 MG/DL (ref 74–99)
GLUCOSE SERPL-MCNC: 60 MG/DL (ref 74–99)
HCT VFR BLD AUTO: 27.8 % (ref 37–54)
HGB BLD-MCNC: 8.5 G/DL (ref 12.5–16.5)
MAGNESIUM SERPL-MCNC: 2.4 MG/DL (ref 1.6–2.6)
MCH RBC QN AUTO: 30.5 PG (ref 26–35)
MCHC RBC AUTO-ENTMCNC: 30.6 G/DL (ref 32–34.5)
MCV RBC AUTO: 99.6 FL (ref 80–99.9)
PHOSPHATE SERPL-MCNC: 4.7 MG/DL (ref 2.5–4.5)
PLATELET # BLD AUTO: 237 K/UL (ref 130–450)
PMV BLD AUTO: 10 FL (ref 7–12)
POTASSIUM SERPL-SCNC: 4.4 MMOL/L (ref 3.5–5)
PROT SERPL-MCNC: 5.6 G/DL (ref 6.4–8.3)
PYRIDOXAL PHOS SERPL-SCNC: 27.4 NMOL/L (ref 20–125)
RBC # BLD AUTO: 2.79 M/UL (ref 3.8–5.8)
SODIUM SERPL-SCNC: 138 MMOL/L (ref 132–146)
WBC OTHER # BLD: 7.2 K/UL (ref 4.5–11.5)

## 2024-12-04 PROCEDURE — 85027 COMPLETE CBC AUTOMATED: CPT

## 2024-12-04 PROCEDURE — 6370000000 HC RX 637 (ALT 250 FOR IP): Performed by: STUDENT IN AN ORGANIZED HEALTH CARE EDUCATION/TRAINING PROGRAM

## 2024-12-04 PROCEDURE — 2700000000 HC OXYGEN THERAPY PER DAY

## 2024-12-04 PROCEDURE — 2580000003 HC RX 258: Performed by: INTERNAL MEDICINE

## 2024-12-04 PROCEDURE — 80053 COMPREHEN METABOLIC PANEL: CPT

## 2024-12-04 PROCEDURE — 1200000000 HC SEMI PRIVATE

## 2024-12-04 PROCEDURE — 99232 SBSQ HOSP IP/OBS MODERATE 35: CPT | Performed by: INTERNAL MEDICINE

## 2024-12-04 PROCEDURE — 84100 ASSAY OF PHOSPHORUS: CPT

## 2024-12-04 PROCEDURE — 6360000002 HC RX W HCPCS: Performed by: PSYCHIATRY & NEUROLOGY

## 2024-12-04 PROCEDURE — 6370000000 HC RX 637 (ALT 250 FOR IP): Performed by: PSYCHIATRY & NEUROLOGY

## 2024-12-04 PROCEDURE — 97530 THERAPEUTIC ACTIVITIES: CPT

## 2024-12-04 PROCEDURE — 6370000000 HC RX 637 (ALT 250 FOR IP): Performed by: INTERNAL MEDICINE

## 2024-12-04 PROCEDURE — 36415 COLL VENOUS BLD VENIPUNCTURE: CPT

## 2024-12-04 PROCEDURE — 83735 ASSAY OF MAGNESIUM: CPT

## 2024-12-04 PROCEDURE — 82947 ASSAY GLUCOSE BLOOD QUANT: CPT

## 2024-12-04 RX ADMIN — LORAZEPAM 1 MG: 2 INJECTION INTRAMUSCULAR; INTRAVENOUS at 08:32

## 2024-12-04 RX ADMIN — ANORECTAL OINTMENT: 15.7; .44; 24; 20.6 OINTMENT TOPICAL at 21:06

## 2024-12-04 RX ADMIN — ANORECTAL OINTMENT: 15.7; .44; 24; 20.6 OINTMENT TOPICAL at 08:33

## 2024-12-04 RX ADMIN — SODIUM CHLORIDE, PRESERVATIVE FREE 10 ML: 5 INJECTION INTRAVENOUS at 08:33

## 2024-12-04 RX ADMIN — Medication 5 MG: at 21:05

## 2024-12-04 RX ADMIN — ALLOPURINOL 100 MG: 100 TABLET ORAL at 08:32

## 2024-12-04 RX ADMIN — APIXABAN 2.5 MG: 2.5 TABLET, FILM COATED ORAL at 08:32

## 2024-12-04 RX ADMIN — QUETIAPINE FUMARATE 12.5 MG: 25 TABLET ORAL at 21:06

## 2024-12-04 RX ADMIN — DIVALPROEX SODIUM 250 MG: 125 CAPSULE, COATED PELLETS ORAL at 21:06

## 2024-12-04 RX ADMIN — DIVALPROEX SODIUM 250 MG: 125 CAPSULE, COATED PELLETS ORAL at 06:12

## 2024-12-04 RX ADMIN — Medication 1 CAPSULE: at 08:32

## 2024-12-04 RX ADMIN — ROSUVASTATIN CALCIUM 10 MG: 5 TABLET, FILM COATED ORAL at 21:06

## 2024-12-04 RX ADMIN — DIVALPROEX SODIUM 250 MG: 125 CAPSULE, COATED PELLETS ORAL at 13:11

## 2024-12-04 RX ADMIN — APIXABAN 2.5 MG: 2.5 TABLET, FILM COATED ORAL at 21:05

## 2024-12-04 RX ADMIN — MIRTAZAPINE 7.5 MG: 15 TABLET, FILM COATED ORAL at 21:06

## 2024-12-04 RX ADMIN — MIDODRINE HYDROCHLORIDE 5 MG: 5 TABLET ORAL at 08:32

## 2024-12-04 NOTE — ACP (ADVANCE CARE PLANNING)
Advance Care Planning   The patient has the following advanced directives on file:  Advance Directives       Power of  Living Will ACP-Advance Directive ACP-Power of     Not on File Not on File Not on File Not on File            The patient has appointed the following active healthcare agents:    Primary Decision Maker: Kathleen Thompson Spouse - [phone]      ABEL Montes  12/4/2024

## 2024-12-04 NOTE — DISCHARGE INSTR - COC
tobacco use Z87.891    Open leg wound, right, subsequent encounter S81.801D    Anemia of chronic renal failure, stage 5 ([facility]) N18.5, D63.1    Encounter regarding vascular access for dialysis for ESRD ([facility]) N18.6, Z99.2    End stage renal disease ([facility]) N18.6    Dizziness R42    Sinus bradycardia R00.1    Cervical compression fracture, initial encounter ([facility]) S12.9XXA    Closed nondisplaced fracture of fourth cervical vertebra ([facility]) S12.301A    Bradycardia R00.1    Sinus node dysfunction ([facility]) I49.5    Moderate protein-calorie malnutrition ([facility]) E44.0    Delirium R41.0    Anoxic encephalopathy ([facility]) G93.1    Hypoperfusion of brain I67.9    Dementia ([facility]) F03.90    Hypotension I95.9    Pneumonitis, aspiration ([facility]) J69.0       Isolation/Infection:   Isolation            No Isolation          Patient Infection Status       None to display            Nurse Assessment:  Last Vital Signs: BP (!) 122/58   Pulse 60   Temp 97.2 °F (36.2 °C) (Temporal)   Resp 18   Ht 1.778 m (5' 10\")   Wt 64.6 kg (142 lb 6.7 oz)   SpO2 94%   BMI 20.43 kg/m²     Last documented pain score (0-10 scale): Pain Level: 0  Last Weight:   Wt Readings from Last 1 Encounters:   12/04/24 64.6 kg (142 lb 6.7 oz)     Mental Status:  oriented to person only     IV Access:  - None    Nursing Mobility/ADLs:  Walking   Assisted  Transfer  Assisted  Bathing  Assisted  Dressing  Assisted  Toileting  Assisted  Feeding  Assisted  Med Admin  Assisted  Med Delivery   crushed and prefers mixed with apple sauce     Wound Care Documentation and Therapy:  Wound 11/21/24 Coccyx (Active)   Wound Image   11/26/24 1445   Wound Etiology Pressure Stage 2 11/29/24 0724   Dressing Status Other (Comment) 12/03/24 1040   Wound Cleansed Soap and water 12/03/24 1040   Dressing/Treatment Pharmaceutical agent (see MAR) 12/04/24 0800   Wound Length (cm) 0.8 cm 12/04/24 0800   Wound Width (cm) 0.5 cm 12/04/24 0800   Wound Depth (cm) 0.1 cm 12/04/24 0800   Wound Surface Area

## 2024-12-04 NOTE — CARE COORDINATION
Social Work/Case Management Transition of Care Planning ([facility] [phone]): Per report and chart review, MBSS was completed on 12/3.  Patient is on a pureed diet with nectar thick liquids.  US of bilateral carotids is pending. MRI of the brain was negative for acute findings.  Neurology signed off.  Nephrology is following for management of HD.  Patient goes to [facility] with a TTS schedule and 11:35 am chair time.  JOHNY placement is needed.  Denials by [facility], and [facility].  Wife also chose 1. [facility] 2. [facility]. Spoke with Georgette of [facility].  They can accept the patient but wife will need to pay cost of transport for dialysis.  They have reached out to a transport company for a reduced rate and are waiting for a return call from his wife to verify she will pay the cost.  CM/SW will follow.   ABEL Montes  12/4/2024    Update:  Per Georgette chu [facility], patient's wife is willing to pay the cost of transport to dialysis.  Pre-cert to be started today by Cor[facility].  JUSTIN/destination completed.  7000 completed.  Discharge envelope with ambulance form is in the soft chart.  CM/SW will follow.  ABEL Montes  12/4/2024

## 2024-12-05 VITALS
RESPIRATION RATE: 16 BRPM | BODY MASS INDEX: 19.85 KG/M2 | WEIGHT: 138.67 LBS | SYSTOLIC BLOOD PRESSURE: 127 MMHG | DIASTOLIC BLOOD PRESSURE: 83 MMHG | HEART RATE: 61 BPM | HEIGHT: 70 IN | TEMPERATURE: 97.3 F | OXYGEN SATURATION: 91 %

## 2024-12-05 LAB
ALBUMIN SERPL-MCNC: 3.3 G/DL (ref 3.5–5.2)
ALP SERPL-CCNC: 80 U/L (ref 40–129)
ALT SERPL-CCNC: 14 U/L (ref 0–40)
ANION GAP SERPL CALCULATED.3IONS-SCNC: 10 MMOL/L (ref 7–16)
AST SERPL-CCNC: 28 U/L (ref 0–39)
BILIRUB SERPL-MCNC: 0.3 MG/DL (ref 0–1.2)
BUN SERPL-MCNC: 46 MG/DL (ref 6–23)
CALCIUM SERPL-MCNC: 8.8 MG/DL (ref 8.6–10.2)
CHLORIDE SERPL-SCNC: 97 MMOL/L (ref 98–107)
CO2 SERPL-SCNC: 31 MMOL/L (ref 22–29)
CREAT SERPL-MCNC: 7.6 MG/DL (ref 0.7–1.2)
ERYTHROCYTE [DISTWIDTH] IN BLOOD BY AUTOMATED COUNT: 14.4 % (ref 11.5–15)
GFR, ESTIMATED: 7 ML/MIN/1.73M2
GLUCOSE BLD-MCNC: 84 MG/DL (ref 74–99)
GLUCOSE BLD-MCNC: 92 MG/DL (ref 74–99)
GLUCOSE SERPL-MCNC: 82 MG/DL (ref 74–99)
HCT VFR BLD AUTO: 28.7 % (ref 37–54)
HGB BLD-MCNC: 8.9 G/DL (ref 12.5–16.5)
MAGNESIUM SERPL-MCNC: 2.6 MG/DL (ref 1.6–2.6)
MCH RBC QN AUTO: 30.9 PG (ref 26–35)
MCHC RBC AUTO-ENTMCNC: 31 G/DL (ref 32–34.5)
MCV RBC AUTO: 99.7 FL (ref 80–99.9)
PHOSPHATE SERPL-MCNC: 4.4 MG/DL (ref 2.5–4.5)
PLATELET # BLD AUTO: 241 K/UL (ref 130–450)
PMV BLD AUTO: 9.6 FL (ref 7–12)
POTASSIUM SERPL-SCNC: 4.3 MMOL/L (ref 3.5–5)
PROT SERPL-MCNC: 5.8 G/DL (ref 6.4–8.3)
RBC # BLD AUTO: 2.88 M/UL (ref 3.8–5.8)
SODIUM SERPL-SCNC: 138 MMOL/L (ref 132–146)
WBC OTHER # BLD: 7.9 K/UL (ref 4.5–11.5)

## 2024-12-05 PROCEDURE — 6370000000 HC RX 637 (ALT 250 FOR IP): Performed by: STUDENT IN AN ORGANIZED HEALTH CARE EDUCATION/TRAINING PROGRAM

## 2024-12-05 PROCEDURE — 6370000000 HC RX 637 (ALT 250 FOR IP): Performed by: INTERNAL MEDICINE

## 2024-12-05 PROCEDURE — 84100 ASSAY OF PHOSPHORUS: CPT

## 2024-12-05 PROCEDURE — 6370000000 HC RX 637 (ALT 250 FOR IP): Performed by: PSYCHIATRY & NEUROLOGY

## 2024-12-05 PROCEDURE — 82947 ASSAY GLUCOSE BLOOD QUANT: CPT

## 2024-12-05 PROCEDURE — 83735 ASSAY OF MAGNESIUM: CPT

## 2024-12-05 PROCEDURE — 90935 HEMODIALYSIS ONE EVALUATION: CPT

## 2024-12-05 PROCEDURE — 85027 COMPLETE CBC AUTOMATED: CPT

## 2024-12-05 PROCEDURE — 36415 COLL VENOUS BLD VENIPUNCTURE: CPT

## 2024-12-05 PROCEDURE — 80053 COMPREHEN METABOLIC PANEL: CPT

## 2024-12-05 PROCEDURE — 1200000000 HC SEMI PRIVATE

## 2024-12-05 RX ORDER — MIRTAZAPINE 7.5 MG/1
7.5 TABLET, FILM COATED ORAL NIGHTLY
DISCHARGE
Start: 2024-12-05

## 2024-12-05 RX ORDER — QUETIAPINE FUMARATE 25 MG/1
12.5 TABLET, FILM COATED ORAL NIGHTLY
DISCHARGE
Start: 2024-12-05 | End: 2024-12-12

## 2024-12-05 RX ORDER — DIVALPROEX SODIUM 125 MG/1
250 CAPSULE, COATED PELLETS ORAL EVERY 8 HOURS SCHEDULED
DISCHARGE
Start: 2024-12-05 | End: 2024-12-12

## 2024-12-05 RX ADMIN — MIDODRINE HYDROCHLORIDE 5 MG: 5 TABLET ORAL at 16:11

## 2024-12-05 RX ADMIN — APIXABAN 2.5 MG: 2.5 TABLET, FILM COATED ORAL at 11:40

## 2024-12-05 RX ADMIN — ALLOPURINOL 100 MG: 100 TABLET ORAL at 11:40

## 2024-12-05 RX ADMIN — DIVALPROEX SODIUM 250 MG: 125 CAPSULE, COATED PELLETS ORAL at 16:11

## 2024-12-05 RX ADMIN — DIVALPROEX SODIUM 250 MG: 125 CAPSULE, COATED PELLETS ORAL at 06:26

## 2024-12-05 RX ADMIN — ANORECTAL OINTMENT: 15.7; .44; 24; 20.6 OINTMENT TOPICAL at 21:18

## 2024-12-05 RX ADMIN — Medication 1 CAPSULE: at 11:40

## 2024-12-05 RX ADMIN — MIDODRINE HYDROCHLORIDE 5 MG: 5 TABLET ORAL at 11:40

## 2024-12-05 ASSESSMENT — PAIN SCALES - GENERAL: PAINLEVEL_OUTOF10: 0

## 2024-12-05 NOTE — CARE COORDINATION
Social Work/Case Management Transition of Care Planning ([facility] [phone]):  Per report and chart review, US of bilateral carotids remains pending.  Nephrology continues to follow for management of HD.  Patient goes to [facility] with a TTS schedule and 11:35 am chair time. Discharge plan is to [facility].  Pre-cert has been approved and is good for 7 days.  Wife is willing to pay cost of transport to dialysis.  JUSTIN/destination completed. 7000 completed. Discharge envelope with ambulance form is in the soft chart. CM/SW will follow.   ABEL Montes  12/5/2024    Update:  Discharge order noted.  Transport via [facility] with a  time of 6:00-8:00 pm.  Notified patient, his wife, Georgette of [facility] and floor nurse.  Discharge envelope with ambulance form is in the soft chart.  ABEL Montes  12/5/2024

## 2024-12-05 NOTE — DISCHARGE SUMMARY
Physician Discharge Summary     Patient ID:  Cleveland Thompson  [id number]  79 y.o.  1945    Admit date: 11/20/2024    Discharge date and time:  12/5/2024    Discharge Diagnoses: Principal Problem:    Hypoperfusion of brain  Active Problems:    Anemia of chronic renal failure, stage 5 (HCC)    End stage renal disease (HCC)    Bradycardia    Sinus node dysfunction (HCC)    Moderate protein-calorie malnutrition (HCC)    Delirium    Anoxic encephalopathy (HCC)    Dementia (HCC)    Hypotension    Pneumonitis, aspiration (HCC)  Resolved Problems:    * No resolved hospital problems. *      Consults: IP CONSULT TO INTERNAL MEDICINE  IP CONSULT TO NEPHROLOGY  IP CONSULT TO ELECTROPHYSIOLOGY  IP CONSULT TO DIETITIAN  IP CONSULT TO PSYCHIATRY  IP CONSULT TO NEUROLOGY  IP CONSULT TO DIETITIAN    Procedures: See below    Hospital Course:   1.  Delirious with dementia: Patient get agitated quickly and neurology consult done MRI shows brain atrophy.  EEG pending.  2.  Bradycardia: Pacemaker placed in November 22.  3.  Pneumonia: Community-acquired pneumonia: Continue Rocephin and azithromycin. completed  4.  Hyperlipidemia: Continue Crestor.  6.  ESRD: Patient received dialysis today and nephrology on the [facility] today    Discharge Exam:  See progress note from today    Condition:  Stable    Disposition: SNF    Patient Instructions:   Current Discharge Medication List        START taking these medications    Details   divalproex (DEPAKOTE SPRINKLE) 125 MG DR capsule Take 2 capsules by mouth every 8 hours for 7 days      mirtazapine (REMERON) 7.5 MG tablet Take 1 tablet by mouth nightly      QUEtiapine (SEROQUEL) 25 MG tablet Take 0.5 tablets by mouth nightly for 7 days           CONTINUE these medications which have NOT CHANGED    Details   midodrine (PROAMATINE) 2.5 MG tablet Take 1 tablet by mouth in the morning and at bedtime Takes 5 mg twice daily on dialysis days.  Qty: 120 tablet, Refills: 5

## 2024-12-05 NOTE — FLOWSHEET NOTE
12/05/24 1121   Vital Signs   /64   Temp (!) 96.7 °F (35.9 °C)   Pulse 67   Respirations 18   Weight - Scale 62.9 kg (138 lb 10.7 oz)   Percent Weight Change 2.95   Post-Hemodialysis Assessment   Machine Disinfection Process Acid/Vinegar Clean;Heat Disinfect   Rinseback Volume (ml) 300 ml   Blood Volume Processed (Liters) 61.6 L   Dialyzer Clearance Lightly streaked   Duration of Treatment (minutes) 210 minutes   Heparin Amount Administered During Treatment (mL) 0 mL   Hemodialysis Intake (ml) 300 ml   Hemodialysis Output (ml) 1800 ml   NET Removed (ml) 1500   Patient Response to Treatment tolerated tx fairly well with mild confusion intermittently   Bilateral Breath Sounds Diminished   Patient Disposition Return to room   Observations & Evaluations   Level of Consciousness 0   Oriented X 1   Heart Rhythm Regular   Respiratory Quality/Effort Unlabored        stretcher

## 2024-12-06 LAB
MICROORGANISM SPEC CULT: NORMAL
SERVICE CMNT-IMP: NORMAL
SPECIMEN DESCRIPTION: NORMAL

## 2024-12-06 NOTE — PLAN OF CARE
Problem: Discharge Planning  Goal: Discharge to home or other facility with appropriate resources  11/21/2024 0820 by Wilman Chen RN  Outcome: Progressing  11/21/2024 0123 by Perla Castrejon RN  Outcome: Progressing     Problem: Skin/Tissue Integrity  Goal: Absence of new skin breakdown  Description: 1.  Monitor for areas of redness and/or skin breakdown  2.  Assess vascular access sites hourly  3.  Every 4-6 hours minimum:  Change oxygen saturation probe site  4.  Every 4-6 hours:  If on nasal continuous positive airway pressure, respiratory therapy assess nares and determine need for appliance change or resting period.  11/21/2024 0820 by Wilman Chen RN  Outcome: Progressing  11/21/2024 0123 by Perla Castrejon RN  Outcome: Progressing     Problem: Safety - Adult  Goal: Free from fall injury  11/21/2024 0820 by Wilman Chen RN  Outcome: Progressing  11/21/2024 0123 by Perla Castrejon RN  Outcome: Progressing     Problem: ABCDS Injury Assessment  Goal: Absence of physical injury  11/21/2024 0820 by Wilman Chen RN  Outcome: Progressing  11/21/2024 0123 by Perla Castrejon RN  Outcome: Progressing     
  Problem: Discharge Planning  Goal: Discharge to home or other facility with appropriate resources  11/21/2024 1510 by Dolores Zamora RN  Outcome: Progressing  Flowsheets  Taken 11/21/2024 1200 by Dolores Zamora RN  Discharge to home or other facility with appropriate resources:   Identify barriers to discharge with patient and caregiver   Identify discharge learning needs (meds, wound care, etc)   Arrange for needed discharge resources and transportation as appropriate   Arrange for interpreters to assist at discharge as needed   Refer to discharge planning if patient needs post-hospital services based on physician order or complex needs related to functional status, cognitive ability or social support system  Taken 11/21/2024 1023 by Wilman Chen RN  Discharge to home or other facility with appropriate resources:   Identify barriers to discharge with patient and caregiver   Arrange for needed discharge resources and transportation as appropriate  11/21/2024 0820 by Wilman Chen RN  Outcome: Progressing  11/21/2024 0123 by Perla Castrejon RN  Outcome: Progressing     Problem: Skin/Tissue Integrity  Goal: Absence of new skin breakdown  Description: 1.  Monitor for areas of redness and/or skin breakdown  2.  Assess vascular access sites hourly  3.  Every 4-6 hours minimum:  Change oxygen saturation probe site  4.  Every 4-6 hours:  If on nasal continuous positive airway pressure, respiratory therapy assess nares and determine need for appliance change or resting period.  11/21/2024 1510 by Dolores Zamora RN  Outcome: Progressing  11/21/2024 0820 by Wilman Chen RN  Outcome: Progressing  11/21/2024 0123 by Perla Castrejon RN  Outcome: Progressing     Problem: Safety - Adult  Goal: Free from fall injury  11/21/2024 1510 by Dolores Zamora RN  Outcome: Progressing  Flowsheets  Taken 11/21/2024 1200 by Dolores Zamora RN  Free From Fall Injury:   Instruct family/caregiver on patient safety   Based on caregiver fall risk 
  Problem: Discharge Planning  Goal: Discharge to home or other facility with appropriate resources  Outcome: Adequate for Discharge     Problem: Skin/Tissue Integrity  Goal: Absence of new skin breakdown  Description: 1.  Monitor for areas of redness and/or skin breakdown  2.  Assess vascular access sites hourly  3.  Every 4-6 hours minimum:  Change oxygen saturation probe site  4.  Every 4-6 hours:  If on nasal continuous positive airway pressure, respiratory therapy assess nares and determine need for appliance change or resting period.  Outcome: Adequate for Discharge     Problem: Safety - Adult  Goal: Free from fall injury  Outcome: Adequate for Discharge     Problem: ABCDS Injury Assessment  Goal: Absence of physical injury  Outcome: Adequate for Discharge     Problem: Neurosensory - Adult  Goal: Achieves stable or improved neurological status  Outcome: Adequate for Discharge     Problem: Cardiovascular - Adult  Goal: Maintains optimal cardiac output and hemodynamic stability  Outcome: Adequate for Discharge     Problem: Skin/Tissue Integrity - Adult  Goal: Skin integrity remains intact  Outcome: Adequate for Discharge     Problem: Pain  Goal: Verbalizes/displays adequate comfort level or baseline comfort level  Outcome: Adequate for Discharge     Problem: Nutrition Deficit:  Goal: Optimize nutritional status  Outcome: Adequate for Discharge     Problem: Chronic Conditions and Co-morbidities  Goal: Patient's chronic conditions and co-morbidity symptoms are monitored and maintained or improved  Outcome: Adequate for Discharge     Problem: Confusion  Goal: Confusion, delirium, dementia, or psychosis is improved or at baseline  Description: INTERVENTIONS:  1. Assess for possible contributors to thought disturbance, including medications, impaired vision or hearing, underlying metabolic abnormalities, dehydration, psychiatric diagnoses, and notify attending LIP  2. [place] high risk fall precautions, as 
  Problem: Discharge Planning  Goal: Discharge to home or other facility with appropriate resources  Outcome: Progressing     Problem: Skin/Tissue Integrity  Goal: Absence of new skin breakdown  Description: 1.  Monitor for areas of redness and/or skin breakdown  2.  Assess vascular access sites hourly  3.  Every 4-6 hours minimum:  Change oxygen saturation probe site  4.  Every 4-6 hours:  If on nasal continuous positive airway pressure, respiratory therapy assess nares and determine need for appliance change or resting period.  Outcome: Progressing     Problem: Safety - Adult  Goal: Free from fall injury  Outcome: Progressing     Problem: ABCDS Injury Assessment  Goal: Absence of physical injury  Outcome: Progressing     
  Problem: Discharge Planning  Goal: Discharge to home or other facility with appropriate resources  Outcome: Progressing  Flowsheets (Taken 11/26/2024 2015)  Discharge to home or other facility with appropriate resources:   Identify barriers to discharge with patient and caregiver   Arrange for needed discharge resources and transportation as appropriate   Identify discharge learning needs (meds, wound care, etc)   Refer to discharge planning if patient needs post-hospital services based on physician order or complex needs related to functional status, cognitive ability or social support system     Problem: Skin/Tissue Integrity  Goal: Absence of new skin breakdown  Description: 1.  Monitor for areas of redness and/or skin breakdown  2.  Assess vascular access sites hourly  3.  Every 4-6 hours minimum:  Change oxygen saturation probe site  4.  Every 4-6 hours:  If on nasal continuous positive airway pressure, respiratory therapy assess nares and determine need for appliance change or resting period.  Outcome: Progressing     Problem: Safety - Adult  Goal: Free from fall injury  Outcome: Progressing  Flowsheets (Taken 11/27/2024 0327)  Free From Fall Injury: Instruct family/caregiver on patient safety     Problem: ABCDS Injury Assessment  Goal: Absence of physical injury  Outcome: Progressing  Flowsheets (Taken 11/27/2024 0327)  Absence of Physical Injury: Implement safety measures based on patient assessment     Problem: Neurosensory - Adult  Goal: Achieves stable or improved neurological status  Outcome: Progressing  Flowsheets (Taken 11/26/2024 2015)  Achieves stable or improved neurological status: Assess for and report changes in neurological status  Goal: Achieves maximal functionality and self care  Outcome: Progressing  Goal: Absence of seizures  Outcome: Progressing  Goal: Remains free of injury related to seizures activity  Outcome: Progressing     Problem: Safety - Medical Restraint  Goal: Remains free of 
  Problem: Discharge Planning  Goal: Discharge to home or other facility with appropriate resources  Recent Flowsheet Documentation  Taken 11/28/2024 2115 by Nicole Moscoso RN  Discharge to home or other facility with appropriate resources: Identify barriers to discharge with patient and caregiver     Problem: ABCDS Injury Assessment  Goal: Absence of physical injury  Recent Flowsheet Documentation  Taken 11/28/2024 1059 by China Johnson RN  Absence of Physical Injury: Implement safety measures based on patient assessment     Problem: Neurosensory - Adult  Goal: Achieves stable or improved neurological status  Recent Flowsheet Documentation  Taken 11/28/2024 2115 by Nicole Moscoso RN  Achieves stable or improved neurological status: Assess for and report changes in neurological status  Goal: Achieves maximal functionality and self care  Recent Flowsheet Documentation  Taken 11/28/2024 2115 by Nicole Moscoso RN  Achieves maximal functionality and self care: Monitor swallowing and airway patency with patient fatigue and changes in neurological status  Goal: Absence of seizures  Recent Flowsheet Documentation  Taken 11/28/2024 2115 by Nicole Moscoso RN  Absence of seizures: Monitor for seizure activity.  If seizure occurs, document type and location of movements and any associated apnea  Goal: Remains free of injury related to seizures activity  Recent Flowsheet Documentation  Taken 11/28/2024 2115 by Nicole Moscoso RN  Remains free of injury related to seizure activity: Maintain airway, patient safety  and administer oxygen as ordered     Problem: Respiratory - Adult  Goal: Achieves optimal ventilation and oxygenation  Recent Flowsheet Documentation  Taken 11/28/2024 2115 by Nicole Moscoso RN  Achieves optimal ventilation and oxygenation: Assess for changes in respiratory status     Problem: Cardiovascular - Adult  Goal: Maintains optimal 
  Problem: Neurosensory - Adult  Goal: Achieves stable or improved neurological status  12/4/2024 1048 by Amairani Fuentes RN  Outcome: Progressing     Problem: Cardiovascular - Adult  Goal: Maintains optimal cardiac output and hemodynamic stability  12/4/2024 1048 by Amairani Fuentes RN  Outcome: Progressing     Problem: Skin/Tissue Integrity - Adult  Goal: Skin integrity remains intact  12/4/2024 1048 by Amairani Fuentes RN  Outcome: Progressing     Problem: Skin/Tissue Integrity  Goal: Absence of new skin breakdown  Description: 1.  Monitor for areas of redness and/or skin breakdown  2.  Assess vascular access sites hourly  3.  Every 4-6 hours minimum:  Change oxygen saturation probe site  4.  Every 4-6 hours:  If on nasal continuous positive airway pressure, respiratory therapy assess nares and determine need for appliance change or resting period.  12/4/2024 1048 by Amairani Fuentes RN  Outcome: Progressing     Problem: Discharge Planning  Goal: Discharge to home or other facility with appropriate resources  12/4/2024 1048 by Amairani Fuentes RN  Outcome: Progressing     Problem: Safety - Adult  Goal: Free from fall injury  12/4/2024 1048 by Amairani Fuentes RN  Outcome: Progressing     Problem: Chronic Conditions and Co-morbidities  Goal: Patient's chronic conditions and co-morbidity symptoms are monitored and maintained or improved  12/4/2024 1048 by Amairani Fuentes RN  Outcome: Progressing     Problem: Confusion  Goal: Confusion, delirium, dementia, or psychosis is improved or at baseline  Description: INTERVENTIONS:  1. Assess for possible contributors to thought disturbance, including medications, impaired vision or hearing, underlying metabolic abnormalities, dehydration, psychiatric diagnoses, and notify attending LIP  2. [place] high risk fall precautions, as indicated  3. Provide frequent short contacts to provide reality reorientation, refocusing and direction  4. Decrease 
  Problem: Neurosensory - Adult  Goal: Achieves stable or improved neurological status  12/4/2024 2223 by Rosemary Godinez RN  Outcome: Progressing     Problem: Cardiovascular - Adult  Goal: Maintains optimal cardiac output and hemodynamic stability  12/4/2024 2223 by Rosemary Godinez RN  Outcome: Progressing     Problem: Skin/Tissue Integrity - Adult  Goal: Skin integrity remains intact  12/4/2024 2223 by Rosemary Godinez RN  Outcome: Progressing     Problem: Skin/Tissue Integrity  Goal: Absence of new skin breakdown  Description: 1.  Monitor for areas of redness and/or skin breakdown  2.  Assess vascular access sites hourly  3.  Every 4-6 hours minimum:  Change oxygen saturation probe site  4.  Every 4-6 hours:  If on nasal continuous positive airway pressure, respiratory therapy assess nares and determine need for appliance change or resting period.  12/4/2024 2223 by Rosemary Godinez RN  Outcome: Progressing     Problem: Discharge Planning  Goal: Discharge to home or other facility with appropriate resources  12/4/2024 2223 by Rosemary Godinez RN  Outcome: Progressing     Problem: Safety - Adult  Goal: Free from fall injury  12/4/2024 2223 by Rosemary Godinez RN  Outcome: Progressing     Problem: Chronic Conditions and Co-morbidities  Goal: Patient's chronic conditions and co-morbidity symptoms are monitored and maintained or improved  12/4/2024 2223 by Rosemary Godinez RN  Outcome: Progressing     Problem: Confusion  Goal: Confusion, delirium, dementia, or psychosis is improved or at baseline  Description: INTERVENTIONS:  1. Assess for possible contributors to thought disturbance, including medications, impaired vision or hearing, underlying metabolic abnormalities, dehydration, psychiatric diagnoses, and notify attending LIP  2. [place] high risk fall precautions, as indicated  3. Provide frequent short contacts to provide reality reorientation, refocusing and 
  Problem: Neurosensory - Adult  Goal: Achieves stable or improved neurological status  Outcome: Not Progressing     Problem: Discharge Planning  Goal: Discharge to home or other facility with appropriate resources  11/22/2024 0022 by Samantha Grey RN  Outcome: Progressing     Problem: Skin/Tissue Integrity  Goal: Absence of new skin breakdown  Description: 1.  Monitor for areas of redness and/or skin breakdown  2.  Assess vascular access sites hourly  3.  Every 4-6 hours minimum:  Change oxygen saturation probe site  4.  Every 4-6 hours:  If on nasal continuous positive airway pressure, respiratory therapy assess nares and determine need for appliance change or resting period.  11/22/2024 0022 by Samantha Grey, RN  Outcome: Progressing     Problem: Safety - Adult  Goal: Free from fall injury  11/22/2024 0022 by Samantha Grey, RN  Outcome: Progressing     Problem: ABCDS Injury Assessment  Goal: Absence of physical injury  11/22/2024 0022 by Samantha Grey, RN  Outcome: Progressing     Problem: Neurosensory - Adult  Goal: Achieves maximal functionality and self care  Outcome: Progressing        Problem: Neurosensory - Adult  Goal: Achieves stable or improved neurological status  Outcome: Not Progressing     
  Problem: Neurosensory - Adult  Goal: Achieves stable or improved neurological status  Outcome: Progressing     Problem: Cardiovascular - Adult  Goal: Maintains optimal cardiac output and hemodynamic stability  Outcome: Progressing     Problem: Skin/Tissue Integrity - Adult  Goal: Skin integrity remains intact  Outcome: Progressing  Flowsheets  Taken 11/30/2024 1126  Skin Integrity Remains Intact: Monitor for areas of redness and/or skin breakdown  Taken 11/30/2024 0740  Skin Integrity Remains Intact: Monitor for areas of redness and/or skin breakdown     
  Problem: Neurosensory - Adult  Goal: Achieves stable or improved neurological status  Outcome: Progressing     Problem: Cardiovascular - Adult  Goal: Maintains optimal cardiac output and hemodynamic stability  Outcome: Progressing     Problem: Skin/Tissue Integrity - Adult  Goal: Skin integrity remains intact  Outcome: Progressing  Flowsheets (Taken 12/1/2024 1255 by Shaina Rock RN)  Skin Integrity Remains Intact: Monitor for areas of redness and/or skin breakdown     Problem: Skin/Tissue Integrity  Goal: Absence of new skin breakdown  Description: 1.  Monitor for areas of redness and/or skin breakdown  2.  Assess vascular access sites hourly  3.  Every 4-6 hours minimum:  Change oxygen saturation probe site  4.  Every 4-6 hours:  If on nasal continuous positive airway pressure, respiratory therapy assess nares and determine need for appliance change or resting period.  12/1/2024 2351 by Debbie Muñoz RN  Outcome: Progressing  12/1/2024 1530 by Shaina Rock RN  Outcome: Progressing     Problem: Discharge Planning  Goal: Discharge to home or other facility with appropriate resources  12/1/2024 2351 by Debbie Muñoz RN  Outcome: Progressing  12/1/2024 1530 by Shaina Rock RN  Outcome: Progressing     Problem: Safety - Adult  Goal: Free from fall injury  12/1/2024 2351 by Debbie Muñoz RN  Outcome: Progressing  12/1/2024 1530 by Shaina Rock RN  Outcome: Progressing  Flowsheets (Taken 12/1/2024 1255)  Free From Fall Injury: Instruct family/caregiver on patient safety     Problem: Chronic Conditions and Co-morbidities  Goal: Patient's chronic conditions and co-morbidity symptoms are monitored and maintained or improved  Outcome: Progressing     Problem: Confusion  Goal: Confusion, delirium, dementia, or psychosis is improved or at baseline  Description: INTERVENTIONS:  1. Assess for possible contributors to thought disturbance, including medications, impaired vision or hearing, underlying metabolic 
  Problem: Neurosensory - Adult  Goal: Achieves stable or improved neurological status  Outcome: Progressing  Flowsheets (Taken 12/3/2024 1040)  Achieves stable or improved neurological status: Assess for and report changes in neurological status     Problem: Discharge Planning  Goal: Discharge to home or other facility with appropriate resources  Outcome: Progressing  Flowsheets (Taken 12/3/2024 1040)  Discharge to home or other facility with appropriate resources:   Identify barriers to discharge with patient and caregiver   Arrange for needed discharge resources and transportation as appropriate   Identify discharge learning needs (meds, wound care, etc)     
  Problem: Safety - Adult  Goal: Free from fall injury  Recent Flowsheet Documentation  Taken 11/29/2024 1335 by China Johnson RN  Free From Fall Injury:   Instruct family/caregiver on patient safety   Based on caregiver fall risk screen, instruct family/caregiver to ask for assistance with transferring infant if caregiver noted to have fall risk factors     Problem: ABCDS Injury Assessment  Goal: Absence of physical injury  Recent Flowsheet Documentation  Taken 11/29/2024 1335 by China Johnson RN  Absence of Physical Injury: Implement safety measures based on patient assessment     Problem: Skin/Tissue Integrity - Adult  Goal: Skin integrity remains intact  Recent Flowsheet Documentation  Taken 11/29/2024 1335 by China Johnson RN  Skin Integrity Remains Intact: Monitor for areas of redness and/or skin breakdown  Goal: Incisions, wounds, or drain sites healing without S/S of infection  Recent Flowsheet Documentation  Taken 11/29/2024 1335 by China Johnson RN  Incisions, Wounds, or Drain Sites Healing Without Sign and Symptoms of Infection: ADMISSION and DAILY: Assess and document risk factors for pressure ulcer development  Goal: Oral mucous membranes remain intact  Recent Flowsheet Documentation  Taken 11/29/2024 1335 by China Johnson RN  Oral Mucous Membranes Remain Intact: Assess oral mucosa and hygiene practices     Problem: Infection - Adult  Goal: Absence of infection at discharge  Recent Flowsheet Documentation  Taken 11/29/2024 1335 by China Johnson RN  Absence of infection at discharge: Assess and monitor for signs and symptoms of infection  Goal: Absence of infection during hospitalization  Recent Flowsheet Documentation  Taken 11/29/2024 1335 by China Johnson RN  Absence of infection during hospitalization: Assess and monitor for signs and symptoms of infection  Goal: Absence of fever/infection during anticipated neutropenic period  Recent Flowsheet Documentation  Taken 11/29/2024 
  Problem: Safety - Medical Restraint  Goal: Remains free of injury from restraints (Restraint for Interference with Medical Device)  Description: INTERVENTIONS:  1. Determine that other, less restrictive measures have been tried or would not be effective before applying the restraint  2. Evaluate the patient's condition at the time of restraint application  3. Inform patient/family regarding the reason for restraint  4. Q2H: Monitor safety, psychosocial status, comfort, nutrition and hydration  11/26/2024 0959 by Larissa Cruz RN  Outcome: Progressing  11/26/2024 0106 by Genoveva Rosado RN  Outcome: Not Progressing  Flowsheets  Taken 11/25/2024 1800 by Criselda Cabrera RN  Remains free of injury from restraints (restraint for interference with medical device): Determine that other, less restrictive measures have been tried or would not be effective before applying the restraint  Taken 11/25/2024 1600 by Criselda Cabrera RN  Remains free of injury from restraints (restraint for interference with medical device): Determine that other, less restrictive measures have been tried or would not be effective before applying the restraint  Taken 11/25/2024 1400 by Criselda Cabrera RN  Remains free of injury from restraints (restraint for interference with medical device): Determine that other, less restrictive measures have been tried or would not be effective before applying the restraint     
  Problem: Skin/Tissue Integrity  Goal: Absence of new skin breakdown  Description: 1.  Monitor for areas of redness and/or skin breakdown  2.  Assess vascular access sites hourly  3.  Every 4-6 hours minimum:  Change oxygen saturation probe site  4.  Every 4-6 hours:  If on nasal continuous positive airway pressure, respiratory therapy assess nares and determine need for appliance change or resting period.  Outcome: Progressing     Problem: Discharge Planning  Goal: Discharge to home or other facility with appropriate resources  Outcome: Progressing     Problem: Safety - Adult  Goal: Free from fall injury  Outcome: Progressing  Flowsheets (Taken 12/1/2024 1255)  Free From Fall Injury: Instruct family/caregiver on patient safety     
Progressing     Problem: Confusion  Goal: Confusion, delirium, dementia, or psychosis is improved or at baseline  Description: INTERVENTIONS:  1. Assess for possible contributors to thought disturbance, including medications, impaired vision or hearing, underlying metabolic abnormalities, dehydration, psychiatric diagnoses, and notify attending LIP  2. [place] high risk fall precautions, as indicated  3. Provide frequent short contacts to provide reality reorientation, refocusing and direction  4. Decrease environmental stimuli, including noise as appropriate  5. Monitor and intervene to maintain adequate nutrition, hydration, elimination, sleep and activity  6. If unable to ensure safety without constant attention obtain sitter and review sitter guidelines with assigned personnel  7. Initiate Psychosocial CNS and Spiritual Care consult, as indicated  Outcome: Progressing     Problem: ABCDS Injury Assessment  Goal: Absence of physical injury  Outcome: Progressing  Flowsheets (Taken 12/2/2024 1342 by Shaina Rock, RN)  Absence of Physical Injury: Implement safety measures based on patient assessment     Problem: Pain  Goal: Verbalizes/displays adequate comfort level or baseline comfort level  Outcome: Progressing     Problem: Nutrition Deficit:  Goal: Optimize nutritional status  Outcome: Progressing  Flowsheets (Taken 12/2/2024 1120 by Albino Lane, RD, LD)  Nutrient intake appropriate for improving, restoring, or maintaining nutritional needs: Recommend appropriate diets, oral nutritional supplements, and vitamin/mineral supplements     
contributors to thought disturbance, including medications, impaired vision or hearing, underlying metabolic abnormalities, dehydration, psychiatric diagnoses, and notify attending LIP  2. [place] high risk fall precautions, as indicated  3. Provide frequent short contacts to provide reality reorientation, refocusing and direction  4. Decrease environmental stimuli, including noise as appropriate  5. Monitor and intervene to maintain adequate nutrition, hydration, elimination, sleep and activity  6. If unable to ensure safety without constant attention obtain sitter and review sitter guidelines with assigned personnel  7. Initiate Psychosocial CNS and Spiritual Care consult, as indicated  Outcome: Progressing     Problem: ABCDS Injury Assessment  Goal: Absence of physical injury  Outcome: Progressing  Flowsheets (Taken 11/30/2024 1126 by Shaina Rock RN)  Absence of Physical Injury: Implement safety measures based on patient assessment     Problem: Pain  Goal: Verbalizes/displays adequate comfort level or baseline comfort level  Outcome: Progressing     
direction  4. Decrease environmental stimuli, including noise as appropriate  5. Monitor and intervene to maintain adequate nutrition, hydration, elimination, sleep and activity  6. If unable to ensure safety without constant attention obtain sitter and review sitter guidelines with assigned personnel  7. Initiate Psychosocial CNS and Spiritual Care consult, as indicated  12/4/2024 0244 by Rosemary Godinez, RN  Outcome: Progressing     Problem: ABCDS Injury Assessment  Goal: Absence of physical injury  12/4/2024 0244 by Rosemary Godinez RN  Outcome: Progressing     Problem: Pain  Goal: Verbalizes/displays adequate comfort level or baseline comfort level  12/4/2024 0244 by Rosemary Godinez RN  Outcome: Progressing     Problem: Nutrition Deficit:  Goal: Optimize nutritional status  12/4/2024 0244 by Rosemary Godinez, RN  Outcome: Progressing     
direction  4. Decrease environmental stimuli, including noise as appropriate  5. Monitor and intervene to maintain adequate nutrition, hydration, elimination, sleep and activity  6. If unable to ensure safety without constant attention obtain sitter and review sitter guidelines with assigned personnel  7. Initiate Psychosocial CNS and Spiritual Care consult, as indicated  12/5/2024 2155 by Rosemary Godinez, RN  Outcome: Progressing     Problem: ABCDS Injury Assessment  Goal: Absence of physical injury  12/5/2024 2155 by Rosemary Godinez RN  Outcome: Progressing     Problem: Pain  Goal: Verbalizes/displays adequate comfort level or baseline comfort level  12/5/2024 2155 by Rosemary Godinez, RN  Outcome: Progressing     Problem: Nutrition Deficit:  Goal: Optimize nutritional status  12/5/2024 2155 by Rosemary Godinez, RN  Outcome: Progressing     
intracranial pressure   Maintain blood pressure and fluid volume within ordered parameters to optimize cerebral perfusion and minimize risk of hemorrhage   Monitor temperature, glucose, and sodium. Initiate appropriate interventions as ordered  Goal: Achieves maximal functionality and self care  Outcome: Not Progressing  Flowsheets (Taken 11/22/2024 0800)  Achieves maximal functionality and self care:   Monitor swallowing and airway patency with patient fatigue and changes in neurological status   Encourage and assist patient to increase activity and self care with guidance from physical therapy/occupational therapy   Encourage visually impaired, hearing impaired and aphasic patients to use assistive/communication devices     
Discharge  Flowsheets (Taken 11/27/2024 1100 by Vel Del Toro RN)  Maintains or returns to baseline bowel function: Assess bowel function  Goal: Maintains adequate nutritional intake  11/27/2024 1415 by Charli Anthony RN  Outcome: Adequate for Discharge  Flowsheets (Taken 11/27/2024 1100 by Vel Del Toro RN)  Maintains adequate nutritional intake: Monitor percentage of each meal consumed  Goal: Establish and maintain optimal ostomy function  11/27/2024 1415 by Charli Anthony RN  Outcome: Adequate for Discharge  Flowsheets (Taken 11/27/2024 1100 by Vel Del Toro RN)  Establish and maintain optimal ostomy function: Administer IV fluids and TPN as ordered     Problem: Genitourinary - Adult  Goal: Absence of urinary retention  11/27/2024 1415 by Charli Anthony RN  Outcome: Adequate for Discharge  Goal: Urinary catheter remains patent  11/27/2024 1415 by Charli Anthony RN  Outcome: Adequate for Discharge     Problem: Metabolic/Fluid and Electrolytes - Adult  Goal: Electrolytes maintained within normal limits  11/27/2024 1415 by Charli Anthony RN  Outcome: Adequate for Discharge  Goal: Hemodynamic stability and optimal renal function maintained  11/27/2024 1415 by Charli Anthony RN  Outcome: Adequate for Discharge  Goal: Glucose maintained within prescribed range  11/27/2024 1415 by Charli Anthony RN  Outcome: Adequate for Discharge     Problem: Infection - Adult  Goal: Absence of infection at discharge  11/27/2024 1415 by Charli Anthony RN  Outcome: Adequate for Discharge  Goal: Absence of infection during hospitalization  11/27/2024 1415 by Charli Anthony RN  Outcome: Adequate for Discharge  Goal: Absence of fever/infection during anticipated neutropenic period  11/27/2024 1415 by Charli Anthony RN  Outcome: Adequate for Discharge     Problem: Hematologic - Adult  Goal: Maintains hematologic stability  11/27/2024 1415 by Charli Anthony RN  Outcome: Adequate for Discharge     Problem: Safety - Medical Restraint  Goal: Remains free of 
function  Outcome: Progressing  Flowsheets (Taken 11/25/2024 1600 by Criselda Cabrera, RN)  Return ADL Status to a Safe Level of Function: Administer medication as ordered     Problem: Gastrointestinal - Adult  Goal: Minimal or absence of nausea and vomiting  Outcome: Progressing  Goal: Maintains or returns to baseline bowel function  Outcome: Progressing  Flowsheets (Taken 11/25/2024 1600 by Criselda Cabrera, RN)  Maintains or returns to baseline bowel function: Assess bowel function  Goal: Maintains adequate nutritional intake  Outcome: Progressing  Flowsheets (Taken 11/25/2024 1600 by Criselda Cabrera, RN)  Maintains adequate nutritional intake: Monitor percentage of each meal consumed  Goal: Establish and maintain optimal ostomy function  Outcome: Progressing     Problem: Genitourinary - Adult  Goal: Absence of urinary retention  Outcome: Progressing  Flowsheets (Taken 11/25/2024 1600 by Criselda Cabrera, RN)  Absence of urinary retention: Assess patient’s ability to void and empty bladder  Goal: Urinary catheter remains patent  Outcome: Progressing     Problem: Infection - Adult  Goal: Absence of infection at discharge  Outcome: Progressing  Flowsheets (Taken 11/25/2024 1600 by Criselda Cabrera, RN)  Absence of infection at discharge: Assess and monitor for signs and symptoms of infection  Goal: Absence of infection during hospitalization  Outcome: Progressing  Flowsheets (Taken 11/25/2024 1600 by Criselda Cabrera, RN)  Absence of infection during hospitalization: Assess and monitor for signs and symptoms of infection  Goal: Absence of fever/infection during anticipated neutropenic period  Outcome: Progressing  Flowsheets (Taken 11/25/2024 1600 by Criselda Cabrera, RN)  Absence of fever/infection during anticipated neutropenic period: Monitor white blood cell count     Problem: Metabolic/Fluid and Electrolytes - Adult  Goal: Electrolytes maintained within normal limits  Outcome: Progressing  Flowsheets (Taken 
Progressing  Flowsheets (Taken 11/22/2024 2000)  Verbalizes/displays adequate comfort level or baseline comfort level:   Assess pain using appropriate pain scale   Administer analgesics based on type and severity of pain and evaluate response   Implement non-pharmacological measures as appropriate and evaluate response   Consider cultural and social influences on pain and pain management

## 2024-12-06 NOTE — PROGRESS NOTES
[facility] Hospitalist Progress Note    Admitting Date and Time: 11/20/2024  5:16 PM  Admit Dx: Bradycardia [R00.1]    Subjective:  Patient is being followed for Bradycardia [R00.1]   He received dialysis yesterday, not in acute distress.      ROS: denies fever, chills, cp, sob, n/v, HA unless stated above.      QUEtiapine  12.5 mg Oral Nightly    divalproex  250 mg Oral 3 times per day    menthol-zinc oxide   Topical BID    melatonin  5 mg Oral Nightly    [Held by provider] metoprolol tartrate  12.5 mg Oral BID    apixaban  2.5 mg Oral BID    mirtazapine  7.5 mg Oral Nightly    midodrine  5 mg Oral TID WC    allopurinol  100 mg Oral Daily    lactobacillus  1 capsule Oral Daily    rosuvastatin  10 mg Oral Nightly    atropine  1 mg IntraVENous Once     LORazepam, 1 mg, Q6H PRN  ziprasidone (GEODON) 10 mg in sterile water 0.5 mL injection, 10 mg, Q12H PRN  sodium chloride flush, 5-40 mL, PRN  sodium chloride, , PRN  albumin human 25%, 25 g, PRN  glucose, 4 tablet, PRN  dextrose bolus, 125 mL, PRN   Or  dextrose bolus, 250 mL, PRN  glucagon (rDNA), 1 mg, PRN  dextrose, , Continuous PRN  dextrose, 25 g, PRN  ondansetron, 4 mg, Q8H PRN   Or  ondansetron, 4 mg, Q6H PRN  polyethylene glycol, 17 g, Daily PRN  acetaminophen, 650 mg, Q6H PRN   Or  acetaminophen, 650 mg, Q6H PRN         Objective:    BP (!) 122/58   Pulse 60   Temp 97.2 °F (36.2 °C) (Temporal)   Resp 18   Ht 1.778 m (5' 10\")   Wt 64.6 kg (142 lb 6.7 oz)   SpO2 94%   BMI 20.43 kg/m²     General Appearance: in no acute distress  Skin: warm and dry  Head: normocephalic and atraumatic  Eyes: pupils equal, round, and reactive to light, extraocular eye movements intact, conjunctivae normal  Neck: neck supple and non tender without mass   Pulmonary/Chest: clear to auscultation bilaterally- no wheezes, rales or rhonchi, normal air movement, no respiratory distress  Cardiovascular: normal rate, normal S1 and S2 and no carotid bruits  Abdomen: soft, 
       [facility] Hospitalist Progress Note    Admitting Date and Time: 11/20/2024  5:16 PM  Admit Dx: Bradycardia [R00.1]    Subjective:  Patient is being followed for Bradycardia [R00.1]   Pt feels receiving dialysis today.      ROS: denies fever, chills, cp, sob, n/v, HA unless stated above.      QUEtiapine  12.5 mg Oral Nightly    divalproex  250 mg Oral 3 times per day    menthol-zinc oxide   Topical BID    melatonin  5 mg Oral Nightly    [Held by provider] metoprolol tartrate  12.5 mg Oral BID    apixaban  2.5 mg Oral BID    mirtazapine  7.5 mg Oral Nightly    midodrine  5 mg Oral TID WC    allopurinol  100 mg Oral Daily    lactobacillus  1 capsule Oral Daily    rosuvastatin  10 mg Oral Nightly    atropine  1 mg IntraVENous Once     LORazepam, 1 mg, Q6H PRN  ziprasidone (GEODON) 10 mg in sterile water 0.5 mL injection, 10 mg, Q12H PRN  sodium chloride flush, 5-40 mL, PRN  sodium chloride, , PRN  albumin human 25%, 25 g, PRN  glucose, 4 tablet, PRN  dextrose bolus, 125 mL, PRN   Or  dextrose bolus, 250 mL, PRN  glucagon (rDNA), 1 mg, PRN  dextrose, , Continuous PRN  dextrose, 25 g, PRN  ondansetron, 4 mg, Q8H PRN   Or  ondansetron, 4 mg, Q6H PRN  polyethylene glycol, 17 g, Daily PRN  acetaminophen, 650 mg, Q6H PRN   Or  acetaminophen, 650 mg, Q6H PRN         Objective:    /65   Pulse 63   Temp 99.5 °F (37.5 °C) (Axillary)   Resp 18   Ht 1.778 m (5' 10\")   Wt 64.5 kg (142 lb 3.2 oz)   SpO2 92%   BMI 20.40 kg/m²     General Appearance: in no acute distress  Skin: warm and dry  Head: normocephalic and atraumatic  Eyes: pupils equal, round, and reactive to light, extraocular eye movements intact, conjunctivae normal  Neck: neck supple and non tender without mass   Pulmonary/Chest: clear to auscultation bilaterally- no wheezes, rales or rhonchi, normal air movement, no respiratory distress  Cardiovascular: normal rate, normal S1 and S2 and no carotid bruits  Abdomen: soft, non-tender, non-distended, 
    Hospitalist Progress Note      SYNOPSIS: Patient admitted on 2024 for Bradycardia   Patient admitted on 2024. Patient was seen with hypotension and bradycardic. Patient was bradycardiac in the 30s. Patient was given midodrine, IVF and admitted for further management. EKG with sinus bradycardia. EP consulted. Patient initiated on dopamine. Titrated off dopamine. Started on low dose Toprol as per EP. Pacer inserted   Patient delirious with agitation, bedside sitter in place    SUBJECTIVE: While having dialysis, patient  Patient seen and examined delirious, not answering to my questions appropriately, confused  Records reviewed.         Temp (24hrs), Av.4 °F (36.3 °C), Min:96.7 °F (35.9 °C), Max:97.8 °F (36.6 °C)    DIET: ADULT DIET; Dysphagia - Minced and Moist; Low Fat/Low Chol/High Fiber/2 gm Na; Low Sodium (2 gm); Low Potassium (Less than 3000 mg/day); Low Phosphorus (Less than 1000 mg)  CODE: Full Code    Intake/Output Summary (Last 24 hours) at 2024 1115  Last data filed at 2024 1001  Gross per 24 hour   Intake 300 ml   Output 1500 ml   Net -1200 ml       Review of Systems  Could not be assessed properly      OBJECTIVE:    BP (!) 107/52   Pulse 67   Temp 97 °F (36.1 °C) (Temporal)   Resp 20   Ht 1.778 m (5' 10\")   Wt 68.6 kg (151 lb 3.8 oz)   SpO2 100%   BMI 21.70 kg/m²     General appearance: Confused  HEENT:  Conjunctivae/corneas clear.   Neck: Supple. No jugular venous distention.   Respiratory: symmetrical; clear to auscultation bilaterally; no wheezes; no rhonchi; no rales  Cardiovascular: rhythm regular; rate controlled; no murmurs  Abdomen: Soft, nontender, nondistended  Extremities:  peripheral pulses present; no peripheral edema; no ulcers  Musculoskeletal: No clubbing, cyanosis, no bilateral lower extremity edema. Brisk capillary refill.   Skin:  No rashes  on visible skin  Neurologic: awake, not following command    ASSESSMENT and PLAN:  Delirium  Patient 
    Hospitalist Progress Note      SYNOPSIS: Patient admitted on 2024 for Bradycardia  Patient admitted on 2024. Patient was seen with hypotension and bradycardic. Patient was bradycardiac in the 30s. Patient was given midodrine, IVF and admitted for further management. EKG with sinus bradycardia. EP consulted. Patient initiated on dopamine. Titrated off dopamine. Started on low dose Toprol as per EP. Pacer inserted   Patient delirious with agitation, bedside sitter in place.   Added Depakote sprinkle, MRI brain without contrast ordered   MRI without contrast has been kept on hold, I did discuss with patient's wife states he gets more agitated during MRI  Increased Depakote to 250 3 times a day  Patient has insomnia, has not slept for few days, started on Ambien    SUBJECTIVE:  Stable overnight. No other overnight issues reported.   Patient seen and examined at bedside today a.m. patient somnolent, easily arousable, still confused  Records reviewed.         Temp (24hrs), Av.2 °F (36.2 °C), Min:96.8 °F (36 °C), Max:97.7 °F (36.5 °C)    DIET: ADULT DIET; Dysphagia - Soft and Bite Sized; Low Fat/Low Chol/High Fiber/2 gm Na; Low Sodium (2 gm); Low Potassium (Less than 3000 mg/day); Low Phosphorus (Less than 1000 mg)  ADULT ORAL NUTRITION SUPPLEMENT; Breakfast, Dinner; Renal Oral Supplement  ADULT ORAL NUTRITION SUPPLEMENT; Lunch, Dinner; Wound Healing Oral Supplement  CODE: Full Code  No intake or output data in the 24 hours ending 24 1027    Review of Systems  Could not be assessed properly      OBJECTIVE:    /62   Pulse 61   Temp 97 °F (36.1 °C) (Axillary)   Resp 18   Ht 1.778 m (5' 10\")   Wt 67 kg (147 lb 11.3 oz)   SpO2 100%   BMI 21.19 kg/m²     General appearance: Somnolent, confused, easily arousable  HEENT:  Conjunctivae/corneas clear.   Neck: Supple. No jugular venous distention.   Respiratory: symmetrical; clear to auscultation bilaterally; no wheezes; no 
    Hospitalist Progress Note      SYNOPSIS: Patient admitted on 2024 for Bradycardia  Patient admitted on 2024. Patient was seen with hypotension and bradycardic. Patient was bradycardiac in the 30s. Patient was given midodrine, IVF and admitted for further management. EKG with sinus bradycardia. EP consulted. Patient initiated on dopamine. Titrated off dopamine. Started on low dose Toprol as per EP. Pacer inserted   Patient delirious with agitation, bedside sitter in place.   Added Depakote sprinkle, MRI brain without contrast ordered   MRI without contrast has been kept on hold, I did discuss with patient's wife states he gets more agitated during MRI  Increased Depakote to 253 times a day    SUBJECTIVE:  Stable overnight.  Still confused not answering to my questions appropriately  patient seen and examined  Records reviewed.         Temp (24hrs), Av.4 °F (36.9 °C), Min:96.9 °F (36.1 °C), Max:99.3 °F (37.4 °C)    DIET: ADULT DIET; Dysphagia - Soft and Bite Sized; Low Fat/Low Chol/High Fiber/2 gm Na; Low Sodium (2 gm); Low Potassium (Less than 3000 mg/day); Low Phosphorus (Less than 1000 mg)  ADULT ORAL NUTRITION SUPPLEMENT; Breakfast, Dinner; Renal Oral Supplement  ADULT ORAL NUTRITION SUPPLEMENT; Lunch, Dinner; Wound Healing Oral Supplement  CODE: Full Code    Intake/Output Summary (Last 24 hours) at 2024 0948  Last data filed at 2024 0853  Gross per 24 hour   Intake 510 ml   Output 1500 ml   Net -990 ml       Review of Systems  Could not be assessed properly      OBJECTIVE:    BP (!) 106/28 Comment: RN notfied about Blood Pressure  Pulse 63   Temp 97.9 °F (36.6 °C) (Oral)   Resp 23   Ht 1.778 m (5' 10\")   Wt 67 kg (147 lb 11.3 oz)   SpO2 97%   BMI 21.19 kg/m²     General appearance:  awake, confused  HEENT:  Conjunctivae/corneas clear.   Neck: Supple. No jugular venous distention.   Respiratory: symmetrical; clear to auscultation bilaterally; no wheezes; no 
    Hospitalist Progress Note      SYNOPSIS: Patient admitted on 2024 for Hypoperfusion of brain  Patient admitted on 2024. Patient was seen with hypotension and bradycardic. Patient was bradycardiac in the 30s. Patient was given midodrine, IVF and admitted for further management. EKG with sinus bradycardia. EP consulted. Patient initiated on dopamine. Titrated off dopamine. Started on low dose Toprol as per EP. Pacer inserted   Patient delirious with agitation, bedside sitter in place.   Added Depakote sprinkle, MRI brain without contrast ordered   MRI without contrast has been kept on hold, I did discuss with patient's wife states he gets more agitated during MRI  Increased Depakote to 250 3 times a day  Patient has insomnia, has not slept for few days, started on Ambien 5mg patient had increased episode of sleepiness so Ambien was decreased to 2.5 mg, and later discontinued  Neurology and psychiatry consulted for further recommendation considering patient's persistent delirium.  Neurology states this to be secondary to hypotensive episodes patient has had an brain hypoperfusion most likely suffered from diffuse anoxic ischemic brain injury      SUBJECTIVE:  Stable overnight. No other overnight issues reported.   Patient seen and examined at bedside today a.m. patient somnolent, easily arousable, confused  Records reviewed.         Temp (24hrs), Av.9 °F (36.6 °C), Min:97.1 °F (36.2 °C), Max:98.8 °F (37.1 °C)    DIET: Diet NPO  CODE: Full Code    Intake/Output Summary (Last 24 hours) at 2024 1045  Last data filed at 2024 2140  Gross per 24 hour   Intake 360 ml   Output 50 ml   Net 310 ml       Review of Systems  Could not be assessed properly      OBJECTIVE:    BP (!) 119/52   Pulse 61   Temp 97.8 °F (36.6 °C) (Temporal)   Resp 16   Ht 1.778 m (5' 10\")   Wt 68.6 kg (151 lb 3.8 oz)   SpO2 100%   BMI 21.70 kg/m²     General appearance: Somnolent, easily arousable, 
    Internal Medicine Progress Note      Synopsis: Patient admitted on 11/20/2024. Patient was seen with hypotension and bradycardic. Patient was daniel in the 30s. Patient was given midodrine, IVF and admitted for further management. EKG with sinus bradycardia. EP consulted. Patient initiated on dopamine. Titrated off dopamine. Started on low dose Toprol as per EP. Pacer inserted.       Assessment and Plan     Bradycardic   Cardiogenic Shock  Syncope  Underwent pacer insertion.   Continue home midodrine     Pneumonia, community acquired  Rocephin + Zithromax    HLD  Crestor    Hypoglycemia  On D5 drip-->stopped today. Monitor blood sugar   Hypoglycemic protocol in place     Fall  CT head negative     ESRD  Nephrology following     Hyperkalemia  Dialysis today     Anemia of chronic Disease   Monitor  Transfuse for hemoglobin <7    Atrial Fibrillation   PPM  Telemetry  Toprol     DVT Prophylaxis: Eliquis   Disposition: Likely needs placement-in restraints     Subjective:     Patient calm and confused  Resting comfortably. No complaints.        Exam:  BP (!) 90/58   Pulse 74   Temp 98 °F (36.7 °C) (Axillary)   Resp 24   Ht 1.778 m (5' 10\")   Wt 67.2 kg (148 lb 2.4 oz)   SpO2 98%   BMI 21.26 kg/m²     General appearance:  No apparent distress, appears stated age and cooperative.  Respiratory:  Normal respiratory effort. Clear to auscultation, bilaterally without Rales/Wheezes/Rhonchi.  Cardiovascular:  Regular rate and rhythm with normal S1/S2 without murmurs, rubs or gallops.  Abdomen: Soft, non-tender, non-distended with normal bowel sounds.  Skin: Bandage at pacer insertion site   Neurologic:  No focal deficits.   Psychiatric:  Confused, calm but can get agitated at times as per nursing     Medications:  Reviewed    Infusion Medications    sodium chloride      dextrose       Scheduled Medications    metoprolol tartrate  12.5 mg Oral BID    [START ON 11/25/2024] apixaban  2.5 mg Oral BID    sodium chloride 
    Internal Medicine Progress Note      Synopsis: Patient admitted on 11/20/2024. Patient was seen with hypotension and bradycardic. Patient was daniel in the 30s. Patient was given midodrine, IVF and admitted for further management. EKG with sinus bradycardia. EP consulted. Patient initiated on dopamine. Titrated off dopamine. Started on low dose Toprol as per EP. Pacer inserted.       Assessment and Plan     Bradycardic   Cardiogenic Shock  Syncope  Underwent pacer insertion.   Continue home midodrine     Pneumonia, community acquired  Rocephin + Zithromax    HLD  Crestor    Hypoglycemia  On D5 drip-->stopped today. Monitor blood sugar   Hypoglycemic protocol in place     Fall  CT head negative     ESRD  Nephrology following     Hyperkalemia  Resolved  Managed with dialysis     Anemia of chronic Disease   Monitor  Transfuse for hemoglobin <7    Atrial Fibrillation   PPM  Telemetry  Toprol     DVT Prophylaxis: Eliquis   Disposition: Likely needs placement    Subjective:     Sitting up in a chair with daughter and sitter by bedside.   No complaints.   No chest pain or shortness of breath     Exam:  BP (!) 86/43   Pulse 60   Temp 98.4 °F (36.9 °C) (Temporal)   Resp 19   Ht 1.778 m (5' 10\")   Wt 68.5 kg (151 lb 1.6 oz)   SpO2 100%   BMI 21.68 kg/m²     General appearance:  No apparent distress, appears stated age and cooperative.  Respiratory:  Normal respiratory effort. Clear to auscultation, bilaterally without Rales/Wheezes/Rhonchi.  Cardiovascular:  Regular rate and rhythm with normal S1/S2 without murmurs, rubs or gallops.  Abdomen: Soft, non-tender, non-distended with normal bowel sounds.  Skin: Bandage at pacer insertion site   Neurologic:  No focal deficits.   Psychiatric:  Confused, calm but can get agitated at times as per nursing     Medications:  Reviewed    Infusion Medications    sodium chloride      dextrose       Scheduled Medications    metoprolol tartrate  12.5 mg Oral BID    [START ON 
    Internal Medicine Progress Note      Synopsis: Patient admitted on 11/20/2024. Patient was seen with hypotension and bradycardic. Patient was daniel in the 30s. Patient was given midodrine, IVF and admitted for further management. EKG with sinus bradycardia. EP consulted. Patient initiated on dopamine. Will need Digoxin therapy s/p pacemaker placement as patient will not tolerate BB or CCB secondary to hypotension despite midodrine.       Assessment and Plan     Bradycardic   Cardiogenic Shock  Syncope  Remains on a dopamine drip   EP Following, for pacer insertion  Continue home midodrine     Pneumonia, community acquired  Rocephin + Zithromax    HLD  Crestor    Hypoglycemia  On D5 drip  Hypoglycemic protocol in place     Fall  CT head and CT chest pending     ESRD  Nephrology following     Hyperkalemia  Resolved with dialysis    Anemia of chronic Disease   Monitor  Transfuse for hemoglobin <7    Atrial Fibrillation   PPM  Telemetry  EP following  Possible digoxin post pacer insertion  Eliquis on hold for procedure     DVT Prophylaxis: Eliquis on hold for procedure   Disposition: Await pacer insertion, titration of dopamine     Subjective:       Patient was agitated earlier and soundly sleeping. Wife asked me not to wake him. No acute complaints. Received pacer today.            Exam:  /64   Pulse 73   Temp 98.3 °F (36.8 °C) (Oral)   Resp 16   Ht 1.778 m (5' 10\")   Wt 69.3 kg (152 lb 12.5 oz)   SpO2 100%   BMI 21.92 kg/m²   General appearance: No apparent distress, sleeping soundly with stable vital signs   Respiratory:  Normal respirations, does not appear to be in distress   Skin:  No rashes on visible skin   Neurologic: sleeping     Medications:  Reviewed    Infusion Medications    dextrose      DOPamine 5 mcg/kg/min (11/22/24 104)    dextrose 5 % and 0.9 % NaCl Stopped (11/22/24 2107)    sodium chloride       Scheduled Medications    vancomycin  1,000 mg IntraVENous Once    allopurinol  100 mg 
    MRI brain was reviewed and did not show any acute findings only moderate atrophy noted in my review.  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    Treat underlying conditions  No further Neuro workup  Neurology to sign off   
  [facility]   Division of Pulmonary, Critical Care and Sleep Medicine  H&P Note    Gabriele Scott MD, MS    Patient: Cleveland Thompson  MRN: [id number]  : 1945    Encounter Time: 11:35 AM     Date of Admission: 2024  5:16 PM    Primary Care Physician: Rafal Weber MD    Reason for ADMISSION: Bradycardia     HISTORY OF PRESENT ILLNESS : Cleveland Thompson 79 y.o. male was seen in consultation regarding the above chief compliant.    79-year-old with history of ESRD on hemodialysis, AAA, DVT on Eliquis, chronic C4 fracture with neck collar, presenting with bradycardia and hypotension.    Patient initially presented to the vascular access center due to hemodialysis access issues when he was found to be bradycardic and hypotensive and brought to the ER where he received 1 L normal saline and 10 mg midodrine with improvement of blood pressure but heart rate still in the 30s.    EP consulted for possible pacemaker placement.    Currently patient is awake and alert and denies any chest pain or shortness of breath or lightheadedness.  He had prior falls with rib fractures and cervical fracture has cervical collar.    :     Patient going for pacemaker today, continuing dopamine until pacer placement.    PAST MEDICAL HISTORY:  has a past medical history of Aneurysm of abdominal aorta (HCC), Deep vein thrombosis of calf (HCC), Gout, History of DVT (deep vein thrombosis), History of tobacco use, Hypertension, Iliac artery aneurysm, bilateral (HCC), Loss of weight, and Right leg DVT (HCC).    SURGICAL HISTORY:  has a past surgical history that includes hernia repair; Tonsillectomy; Abdominal aortic aneurysm repair; open thoracic aortic aneurysm repair (Bilateral, 2018); vascular surgery (Left, 2023); Dialysis fistula creation (Left, 2023); and ep device procedure (N/A, 2024).     SOCIAL HISTORY:  reports that he quit smoking about 29 years ago. His smoking use included cigarettes. He has never 
  Speech Language Pathology  NAME:  Cleveland Thompson  :  1945  DATE: 12/3/2024  ROOM:  [facility]    Pt unavailable at 1445 for Cognitive Evaluation     REASON:  Pt refused to participate  
 called, made aware order is in for camera.  
 started at 1538  
11/26- Spoke to Fine with [facility]- MRI scheduled for 11am 11/27. RN aware to come down with patient during scan.  
4 Eyes Skin Assessment     NAME:  Cleveland Thompson  YOB: 1945  MEDICAL RECORD NUMBER:  [id number]    The patient is being assessed for  Admission    I agree that at least one RN has performed a thorough Head to Toe Skin Assessment on the patient. ALL assessment sites listed below have been assessed.      Areas assessed by both nurses:    Head, Face, Ears, Shoulders, Back, Chest, Arms, Elbows, Hands, Sacrum. Buttock, Coccyx, Ischium, Legs. Feet and Heels, and Under Medical Devices         Does the Patient have a Wound? Yes wound(s) were present on assessment. LDA wound assessment was Initiated and completed by RN       Janak Prevention initiated by RN: Yes  Wound Care Orders initiated by RN: No    Pressure Injury (Stage 3,4, Unstageable, DTI, NWPT, and Complex wounds) if present, place Wound referral order by RN under : No    New Ostomies, if present place, Ostomy referral order under : No     Nurse 1 eSignature: Electronically signed by Dolores Zamora RN on 11/21/24 at 12:41 PM EST    **SHARE this note so that the co-signing nurse can place an eSignature**    Nurse 2 eSignature: Electronically signed by Bronwyn Alberto RN on 11/21/24 at 1:31 PM EST  
4 Eyes Skin Assessment     NAME:  Cleveland Thompson  YOB: 1945  MEDICAL RECORD NUMBER:  [id number]    The patient is being assessed for  Transfer    I agree that at least one RN has performed a thorough Head to Toe Skin Assessment on the patient. ALL assessment sites listed below have been assessed.      Areas assessed by both nurses:    Head, Face, Ears, Shoulders, Back, Chest, Arms, Elbows, Hands, Sacrum. Buttock, Coccyx, Ischium, and Legs. Feet and Heels        Does the Patient have a Wound? No noted wound(s)  BUE ecchymosis  Dry scabs Right leg  Dry and flaky BLE         Janak Prevention initiated by RN: Yes  Wound Care Orders initiated by RN: Yes    Pressure Injury (Stage 3,4, Unstageable, DTI, NWPT, and Complex wounds) if present, place Wound referral order by RN under : No    New Ostomies, if present place, Ostomy referral order under : No     Nurse 1 eSignature: Electronically signed by Rosemary Godinez RN on 12/4/24 at 4:46 AM EST    **SHARE this note so that the co-signing nurse can place an eSignature**    Nurse 2 eSignature: Electronically signed by Reed Perez RN on 12/4/24 at 6:44 AM EST  
4 Eyes Skin Assessment     NAME:  Cleveland Thompson  YOB: 1945  MEDICAL RECORD NUMBER:  [id number]    The patient is being assessed for  Admission    I agree that at least one RN has performed a thorough Head to Toe Skin Assessment on the patient. ALL assessment sites listed below have been assessed.      Areas assessed by both nurses:    Head, Face, Ears, Shoulders, Back, Chest, Arms, Elbows, Hands, Sacrum. Buttock, Coccyx, Ischium, Legs. Feet and Heels, and Under Medical Devices         Does the Patient have a Wound? Yes wound(s) were present on assessment. LDA wound assessment was Initiated and completed by RN       Janak Prevention initiated by RN: Yes  Wound Care Orders initiated by RN: Yes    Pressure Injury (Stage 3,4, Unstageable, DTI, NWPT, and Complex wounds) if present, place Wound referral order by RN under : Yes    New Ostomies, if present place, Ostomy referral order under : No     Nurse 1 eSignature: Electronically signed by Perla Castrejon RN on 11/21/24 at 12:49 AM EST    **SHARE this note so that the co-signing nurse can place an eSignature**    Nurse 2 eSignature: Electronically signed by Genoveva Rosado RN on 11/21/24 at 2:37 AM EST   
Blood sugar was 62. Apple juice given and rechecked BS 72.  
Comprehensive Nutrition Assessment    Type and Reason for Visit:  Initial, Consult (LOS)    Nutrition Recommendations/Plan:   Recommend and start Nepro renal supplement BID and Mendoza wound healing supplement BID to help meet increased nutritional needs from wound healing and d/t known losses from dialysis.           Malnutrition Assessment:  Malnutrition Status:  Moderate malnutrition (11/27/24 1056)    Context:  Chronic Illness     Findings of the 6 clinical characteristics of malnutrition:  Energy Intake:  75% or less estimated energy requirements for 1 month or longer  Weight Loss:  Unable to assess (d/t limited actual weight history)     Body Fat Loss:   (moderate) Orbital, Triceps   Muscle Mass Loss:   (moderate) Temples (temporalis), Clavicles (pectoralis & deltoids)  Fluid Accumulation:  No fluid accumulation     Strength:  Not Performed    Nutrition Assessment:    Patient adm w/ bradycardia/hypotension/dizziness ; hx of ESRD w/ HD ; s/p pacemaker insertion on 11/22 ;  noted PNA and cardiogenic shock ; hx of DVT/CKD/hypertension/gout/abdominal aorta aneurysm ; noted delirium ; wound noted ; pt also meets criteria for moderate malnutrition ; recent syncope and fall ; will provide recommendations    Nutrition Related Findings:    I&Os WNL, no edema, redness to heels, missing teeth, expressive aphasia, HD, muscle/fat wasting ; Wound Type: Open Wounds, Stage II, Multiple, Surgical Incision (wound x 1 ; incisions x 2 noted to chest)       Current Nutrition Intake & Therapies:    Average Meal Intake: 26-50% (no meals recorded in flowsheets at this time)     ADULT DIET; Dysphagia - Soft and Bite Sized; Low Fat/Low Chol/High Fiber/2 gm Na; Low Sodium (2 gm); Low Potassium (Less than 3000 mg/day); Low Phosphorus (Less than 1000 mg)    Anthropometric Measures:  Height: 177.8 cm (5' 10\")  Ideal Body Weight (IBW): 166 lbs (75 kg)    Admission Body Weight: 68.9 kg (152 lb) [facility]  Current Body Weight: 67.6 
Comprehensive Nutrition Assessment    Type and Reason for Visit:  Reassess    Nutrition Recommendations/Plan:   Advance diet when medically appropriate.    When diet advances, will recommend and start appropriate nutritional supplementation to help meet increased nutritional needs and d/t decreased po intake of meals.    Monitor pending speech/swallow consult.           Malnutrition Assessment:  Malnutrition Status:  Moderate malnutrition (11/27/24 1056)    Context:  Chronic Illness     Findings of the 6 clinical characteristics of malnutrition:  Energy Intake:  75% or less estimated energy requirements for 1 month or longer  Weight Loss:  Unable to assess (d/t limited actual weight history)     Body Fat Loss:   (moderate) Orbital, Triceps   Muscle Mass Loss:   (moderate) Temples (temporalis), Clavicles (pectoralis & deltoids)  Fluid Accumulation:  No fluid accumulation     Strength:  Not Performed    Nutrition Assessment:    Patient is currently NPO ; patients po intake has been sporadic since admission, averaging 25-50% of meals served ; noted diffuse anoxic ischemic brain/hypoperfusion of brain and anoxic encephalopathy ; adm w/ bradycardia/hypotension/dizziness ; hx of ESRD w/ HD ; s/p pacemaker insertion on 11/22 ;  noted PNA and cardiogenic shock ; hx of DVT/CKD/hypertension/gout/abdominal aorta aneurysm ; noted delirium w/ dementia ; wound noted ; pt also meets criteria for moderate malnutrition ; recent syncope and fall ; will provide updated recommendations    Nutrition Related Findings:    -I&Os (-1.5 L), no edema, active BS, A&O x 2, upper dentures/missing teeth, HD, ammomia 13, muscle/fat wasting ; Wound Type: Open Wounds, Stage II, Multiple, Surgical Incision (wound x 1 ; incisions x 2 noted to chest)       Current Nutrition Intake & Therapies:    Average Meal Intake: NPO, 26-50%     Diet NPO    Anthropometric Measures:  Height: 177.8 cm (5' 10\")  Ideal Body Weight (IBW): 166 lbs (75 kg)  
[facility]. [facility]illas [facility] but pt restless, request for med to premedicate.   
Elías Kline, NP, notified of patient refusing medication/spitting pills out. Also notified of trending low BPs - last one 100/44.    Genoveva Miller RN  
MRI screening form needs filled out, thank you!  
MRI screening needs completed, thank you  
MRI was scheduled for 11AM,  called and said the MRI is on hold for now. We let the pacemaker rep know and they informed us that if the doctor still wants the MRI, it will have to wait until Monday now.  
Neurology consult sent to Dr. Manjarrez via [employer].  
Nurse to nurse report called, given to Dilia Bolus  
Patient admitted to MICU with the following belongings:  Dentures (Upper), Glasses, Watch, Pants, Shirt, Socks, Shoes, Jacket, and Hat. The following belongings admitted with the patient, None, were sent home with the patient's family.   
Patient at first agree to take the night pills but eventually refusing it. Encourage him to swallow the pills with applesauce but adamantly refused.  
Patient brought to US for carotid exam. Unable to complete Ultrasound due to neck collar.  
Patient is scheduled with [facility] rep 12/3 at 11am- RN made aware.  
Patient refused exam yesterday, if he becomes agreeable today call [phone] to schedule CT. Thank you.  
Patient refusing scans at this time.  
Patient spit out all meds, no IV access  
Patient transported to [facility] via [facility] ambulance.  
Physical Therapy  Initial Assessment     Name: Cleveland Thompson  : 1945  MRN: [id number]      Date of Service: 2024    Evaluating PT: Johnathan Quiñonez, PT, DPT [id number]      Room #:  6402/6402-A  Diagnosis:  Bradycardia [R00.1]  PMHx/PSHx:   has a past medical history of Aneurysm of abdominal aorta (HCC), Deep vein thrombosis of calf (HCC), Gout, History of DVT (deep vein thrombosis), History of tobacco use, Hypertension, Iliac artery aneurysm, bilateral (HCC), Loss of weight, and Right leg DVT (HCC).  Precautions:  Fall risk, Cervical collar, Cervical precautions, L rib fractures, HD,     SUBJECTIVE:    Pt is a poor historian and unable to provide social hx/PLOF. Per chart, pt is from home.    OBJECTIVE:   Initial Evaluation  Date: 24 Treatment Date: Short Term/ Long Term   Goals   AM-PAC 6 Clicks      Was pt agreeable to Eval/treatment? Yes     Does pt have pain? No complaints/indications of pain     Bed Mobility  Rolling: MaxA  Supine to sit: MaxA  Sit to supine: MaxA  Scooting: MaxA to EOB  Rolling: Sami  Supine to sit: Sami  Sit to supine: Sami  Scooting: Sami   Transfers Sit to stand: NT  Stand to sit: NT  Stand pivot: NT  Sit to stand: ModA  Stand to sit: ModA  Stand pivot: ModA   Ambulation   NT  >25 feet with WW with ModA   Stair negotiation: ascended and descended NT  NA   ROM BUE: Refer to OT note  BLE: WFL     Strength BUE: Refer to OT note  BLE: NT     Balance Sitting EOB: MaxA  Dynamic Standing: NT  Sitting EOB: SBA  Dynamic Standing: ModA with WW     Pt is A & O x: 1 to person.   Sensation: Pt denies numbness and tingling of extremities.   Edema: Unremarkable    Patient education  Pt educated on PT role in acute care setting.    Patient response to education:   Pt verbalized understanding Pt demonstrated skill Pt requires further education in this area   Yes NA No     ASSESSMENT:    Conditions Requiring Skilled Therapeutic Intervention:    [x]Decreased 
Physical Therapy  Treatment Note     Name: Cleveland Thompson  : 1945  MRN: [id number]      Date of Service: 2024    Evaluating PT: Johnathan Quiñonez, PT, DPT [id number]      Room #:  8421/8421-B  Diagnosis:  Bradycardia [R00.1]  PMHx/PSHx:   has a past medical history of Aneurysm of abdominal aorta (HCC), Deep vein thrombosis of calf (HCC), Gout, History of DVT (deep vein thrombosis), History of tobacco use, Hypertension, Iliac artery aneurysm, bilateral (HCC), Loss of weight, and Right leg DVT (HCC).  Precautions:  Fall risk, Cervical collar, Cervical precautions, L rib fractures, HD,     SUBJECTIVE:    Pt is a poor historian and unable to provide social hx/PLOF. Per chart, pt is from home.    OBJECTIVE:   Initial Evaluation  Date: 24 Treatment Date: 24 Short Term/ Long Term   Goals   AM-PAC 6 Clicks     Was pt agreeable to Eval/treatment? Yes Yes    Does pt have pain? No complaints/indications of pain No complaints of pain    Bed Mobility  Rolling: MaxA  Supine to sit: MaxA  Sit to supine: MaxA  Scooting: MaxA to EOB Rolling: MaxA  Supine to sit: MaxA  Sit to supine: MaxA  Scooting: MaxA to EOB Rolling: Sami  Supine to sit: Sami  Sit to supine: Sami  Scooting: Sami   Transfers Sit to stand: NT  Stand to sit: NT  Stand pivot: NT Sit to stand: NT  Stand to sit: NT  Stand pivot: NT Sit to stand: ModA  Stand to sit: ModA  Stand pivot: ModA   Ambulation   NT NT >25 feet with WW with ModA   Stair negotiation: ascended and descended NT NT NA   ROM BUE: Refer to OT note  BLE: WFL NT    Strength BUE: Refer to OT note  BLE: NT NT    Balance Sitting EOB: MaxA  Dynamic Standing: NT Sitting EOB: MaxA  Dynamic Standing: NT Sitting EOB: SBA  Dynamic Standing: ModA with WW     Pt is A & O x: 1 to person. Pt was very lethargic.  Sensation: Pt denies numbness and tingling of extremities.   Edema: Unremarkable    Patient education  Pt educated on PT role in acute care setting.    Patient 
Please call CT @ [phone] when ready to schedule a time for scans.  
Psychiatry consult sent to Dr. Doss via [employer].  
Pt alert and oriented to own ability  Baseline confusion  The patients vitals this morning were 89/50 and previous 88/37, then manually 90/60, 90/62. He was given midodrine. Cr. 9.7  The patients wife is at bedside and answers questions for him.   The patient was sent in from nephrology for bradycardia.   Ep on board  Pt npo for possible procedures  The -patients blood sugar 77-59 given glucose tabs.   The patient has c collar due to fractures treated last month and sent to rehab, he went home on Saturday 11/16.  The patients heart rate steadily 30-40 and even as low as 28.  Can't get an accurate oxygen reading due to cold hands, toes and ears.   The patient transferred to higher level of care due to instability.      
Pt refusing blood sugar check as well as night time meds. Attempted twice with pt getting increasingly irritable. Allowed this RN to assess breath sounds but that is all.    Hallie Graham RN    
RN notified Elías Kline NP that patients wife is very adamant about talking to a doctor before patient goes for MRI  
Report called to [phone]. Pt placed in transport.   
Report given to [phone] nurse  
SPEECH/LANGUAGE PATHOLOGY  VIDEOFLUOROSCOPIC STUDY OF SWALLOWING (MBS)   and PLAN OF CARE    PATIENT NAME:  Cleveland Thompson  (male)     MRN:  [id number]    :  1945  (79 y.o.)  STATUS:  Inpatient: Room 6402/6402-A    TODAY'S DATE:  12/3/2024  ORDER DATE, DESCRIPTION AND REFERRING PROVIDER:  24 : FL MODIFIED BARIUM SWALLOW W VIDEO :  Dr. Manjarrez  REASON FOR REFERRAL: dysphagia   EVALUATING THERAPIST: NICOLE Hart      RESULTS:      DYSPHAGIA DIAGNOSIS:  Clinical indicators of mild-moderate oropharyngeal phase dysphagia     DIET RECOMMENDATIONS:  Pureed consistency solids (IDDSI level 4) with  nectar consistency (mildly thick - IDDSI level 2) liquids    FEEDING RECOMMENDATIONS:    Assistance level:  Set-up is required for all oral intake     Compensatory strategies recommended: Liquid wash after thicker items to assist with clearing pharyngeal residue      Discussed recommendations with:  patient nurse via phone      SPEECH THERAPY  PLAN OF CARE   The dysphagia POC is established based on physician order and dysphagia diagnosis    Meal time assessment for 1-2 sessions to provide diet modification and compensatory strategy implementation to safely advance diet as functional ability improves      Conditions Requiring Skilled Therapeutic Intervention for dysphagia:    Patient is performing below functional baseline d/t  current acute condition, Multiple diagnoses, multiple medications, and increased dependency upon caregivers.    SPECIFIC DYSPHAGIA INTERVENTIONS TO INCLUDE:     ongoing mealtime assessment to provide diet modification and compensatory strategy implementation to minimize risk of aspiration associated with PO intake  PO trials of upgraded diet textures with SLP only to determine the least restrictive PO diet     Specific instructions for next treatment:  therapeutic po trial to determine safety of advanced diet textures and consistencies  Treatment Goals:    Short Term Goals:  Pt will 
Shruti in lab notified of STAT blood culture orders x2.  
Spoke to Dr. Mcghee, would like patient transferred to [facility], [facility] cannot accept due to lack of beds, MICU accepted patient. Spoke directly to Dr. Dhillon.   
Spoke to MRI department about possibility of doing MRI and CT in same trip. Pt will need to be cleared by [employer] prior to MRI. MRI not likely for today.  
[facility]  Dialysis Progress Note    Patient's Name: Cleveland Thompson    Reason for Consult: ESRD    Chief Complaint: Hypotensive, bradycardic  History Obtained From:  patient, past medical records, and EMR    History of Present Illness:    11/21: Cleveland Thompson is a 79 y.o. male with a past medical history of DVT, hypertension, and gout.  He presented to the ED on 11/20 for concerns of hypotension and bradycardia.  He reportedly underwent an angioplasty on 11/20. Vital signs on 11/20 includes temperature 96.9, respirations 16, pulse 45, BP 82/39, and he was 92% SpO2.  Lab data on 11/20 includes potassium 5, BUN 53, creatinine 9.3, anion gap 18, proBNP 70,209, and hemoglobin 12.  He had a chest x-ray on 11/20 which showed increasing parenchymal density projecting over the right mid lower lung concerning for pneumonia and/or atelectasis. EP has been consulted to see the patient for concerns of bradycardia.   Nephrology has been consulted to see the patient for ESRD.  He is known to our service and dialyzes as an outpatient at [facility] via left arm AV fistula.  At present, patient was seen and examined in the ICU.  He is a questionable historian at this time.  He denies any palpitations.    11/22 :pt seen in icu. Sp hd yesterday with 900 ml off, wife present, sp pacer today    11/23: Seen and examined at 9:25 AM undergoing HD in his room.  Tolerating treatment fairly well.  BP fairly stable within normal range.  Not in distress.    PMH:    Past Medical History:   Diagnosis Date    Aneurysm of abdominal aorta ([facility]) 9/27/2012    Deep vein thrombosis of calf ([facility])     R calf vein thrombosis involving post tibial vein with extension in close proximity  to popliteal vein    Gout     History of DVT (deep vein thrombosis) 3/12/2015    History of tobacco use 7/27/2017    Hypertension     Iliac artery aneurysm, bilateral ([facility]) 6/29/2017    Loss of weight     diet and exercise    Right leg DVT ([facility]) 3/12/2015 
[facility]  Nephrology Progress Note    Patient's Name: Cleveland Thmopson    History of Present Illness from 11/21 consult note:    \"Cleveland Thompson is a 79 y.o. male with a past medical history of DVT, hypertension, and gout.  He presented to the ED on 11/20 for concerns of hypotension and bradycardia.  He reportedly underwent an angioplasty on 11/20. Vital signs on 11/20 includes temperature 96.9, respirations 16, pulse 45, BP 82/39, and he was 92% SpO2.  Lab data on 11/20 includes potassium 5, BUN 53, creatinine 9.3, anion gap 18, proBNP 70,209, and hemoglobin 12.  He had a chest x-ray on 11/20 which showed increasing parenchymal density projecting over the right mid lower lung concerning for pneumonia and/or atelectasis. EP has been consulted to see the patient for concerns of bradycardia.   Nephrology has been consulted to see the patient for ESRD.  He is known to our service and dialyzes as an outpatient at [facility] via left arm AV fistula.  At present, patient was seen and examined in the ICU.  He is a questionable historian at this time.  He denies any palpitations.\"    Subjective:    11/24/2024: Patient was seen and examined.  He is sitting in a chair and denies any shortness of breath or nausea.  Visitor at bedside.  He had HD yesterday with 1.4 L net removed.    PMH:    Past Medical History:   Diagnosis Date    Aneurysm of abdominal aorta (HCC) 9/27/2012    Deep vein thrombosis of calf ([facility])     R calf vein thrombosis involving post tibial vein with extension in close proximity  to popliteal vein    Gout     History of DVT (deep vein thrombosis) 3/12/2015    History of tobacco use 7/27/2017    Hypertension     Iliac artery aneurysm, bilateral (HCC) 6/29/2017    Loss of weight     diet and exercise    Right leg DVT ([facility]) 3/12/2015       Past Surgical History:   Procedure Laterality Date    ABDOMINAL AORTIC ANEURYSM REPAIR      2018    DIALYSIS FISTULA CREATION Left 11/17/2023    creation AVF 
[facility]  Nephrology Progress Note    Patient's Name: Cleveland Thompson    History of Present Illness from 11/21 consult note:    \"Cleveland Thompson is a 79 y.o. male with a past medical history of DVT, hypertension, and gout.  He presented to the ED on 11/20 for concerns of hypotension and bradycardia.  He reportedly underwent an angioplasty on 11/20. Vital signs on 11/20 includes temperature 96.9, respirations 16, pulse 45, BP 82/39, and he was 92% SpO2.  Lab data on 11/20 includes potassium 5, BUN 53, creatinine 9.3, anion gap 18, proBNP 70,209, and hemoglobin 12.  He had a chest x-ray on 11/20 which showed increasing parenchymal density projecting over the right mid lower lung concerning for pneumonia and/or atelectasis. EP has been consulted to see the patient for concerns of bradycardia.   Nephrology has been consulted to see the patient for ESRD.  He is known to our service and dialyzes as an outpatient at [facility] via left arm AV fistula.  At present, patient was seen and examined in the ICU.  He is a questionable historian at this time.  He denies any palpitations.\"    Subjective:    12/3/2024: Patient was seen and examined.  He denies any pain or shortness of breath.  HD today.    PMH:    Past Medical History:   Diagnosis Date    Aneurysm of abdominal aorta (HCC) 9/27/2012    Deep vein thrombosis of calf (HCC)     R calf vein thrombosis involving post tibial vein with extension in close proximity  to popliteal vein    Gout     History of DVT (deep vein thrombosis) 3/12/2015    History of tobacco use 7/27/2017    Hypertension     Iliac artery aneurysm, bilateral (HCC) 6/29/2017    Loss of weight     diet and exercise    Right leg DVT (HCC) 3/12/2015       Past Surgical History:   Procedure Laterality Date    ABDOMINAL AORTIC ANEURYSM REPAIR      2018    DIALYSIS FISTULA CREATION Left 11/17/2023    creation AVF left arm performed by Mariana Gutierrez MD at [facility] [place]    EP DEVICE PROCEDURE 
[facility]  Nephrology Progress Note    Patient's Name: Cleveland Thompson    History of Present Illness from 11/21 consult note:    \"Cleveland Thompson is a 79 y.o. male with a past medical history of DVT, hypertension, and gout.  He presented to the ED on 11/20 for concerns of hypotension and bradycardia.  He reportedly underwent an angioplasty on 11/20. Vital signs on 11/20 includes temperature 96.9, respirations 16, pulse 45, BP 82/39, and he was 92% SpO2.  Lab data on 11/20 includes potassium 5, BUN 53, creatinine 9.3, anion gap 18, proBNP 70,209, and hemoglobin 12.  He had a chest x-ray on 11/20 which showed increasing parenchymal density projecting over the right mid lower lung concerning for pneumonia and/or atelectasis. EP has been consulted to see the patient for concerns of bradycardia.   Nephrology has been consulted to see the patient for ESRD.  He is known to our service and dialyzes as an outpatient at [facility] via left arm AV fistula.  At present, patient was seen and examined in the ICU.  He is a questionable historian at this time.  He denies any palpitations.\"    Subjective:    12/4/2024: Patient was seen and examined.  He is awake, no distress.  Visitor at bedside.    PMH:    Past Medical History:   Diagnosis Date    Aneurysm of abdominal aorta (HCC) 9/27/2012    Deep vein thrombosis of calf (HCC)     R calf vein thrombosis involving post tibial vein with extension in close proximity  to popliteal vein    Gout     History of DVT (deep vein thrombosis) 3/12/2015    History of tobacco use 7/27/2017    Hypertension     Iliac artery aneurysm, bilateral (HCC) 6/29/2017    Loss of weight     diet and exercise    Right leg DVT ([facility]) 3/12/2015       Past Surgical History:   Procedure Laterality Date    ABDOMINAL AORTIC ANEURYSM REPAIR      2018    DIALYSIS FISTULA CREATION Left 11/17/2023    creation AVF left arm performed by Mariana Gutierrez MD at [facility] [place]    EP DEVICE PROCEDURE N/A 
[facility]  Nephrology Progress Note    Patient's Name: Cleveland Thompson    History of Present Illness from 11/21 consult note:    \"Cleveland Thompson is a 79 y.o. male with a past medical history of DVT, hypertension, and gout.  He presented to the ED on 11/20 for concerns of hypotension and bradycardia.  He reportedly underwent an angioplasty on 11/20. Vital signs on 11/20 includes temperature 96.9, respirations 16, pulse 45, BP 82/39, and he was 92% SpO2.  Lab data on 11/20 includes potassium 5, BUN 53, creatinine 9.3, anion gap 18, proBNP 70,209, and hemoglobin 12.  He had a chest x-ray on 11/20 which showed increasing parenchymal density projecting over the right mid lower lung concerning for pneumonia and/or atelectasis. EP has been consulted to see the patient for concerns of bradycardia.   Nephrology has been consulted to see the patient for ESRD.  He is known to our service and dialyzes as an outpatient at [facility] via left arm AV fistula.  At present, patient was seen and examined in the ICU.  He is a questionable historian at this time.  He denies any palpitations.\"    Subjective:    11/25/2024: Patient was seen and examined on HD. He is resting in the bed, sitter at the bedside. He is confused, confused as baseline but seems worse today than usual, unable to answer any questions appropriately. Chart reviewed, no acute events overnight.    PMH:    Past Medical History:   Diagnosis Date    Aneurysm of abdominal aorta (HCC) 9/27/2012    Deep vein thrombosis of calf ([facility])     R calf vein thrombosis involving post tibial vein with extension in close proximity  to popliteal vein    Gout     History of DVT (deep vein thrombosis) 3/12/2015    History of tobacco use 7/27/2017    Hypertension     Iliac artery aneurysm, bilateral (HCC) 6/29/2017    Loss of weight     diet and exercise    Right leg DVT ([facility]) 3/12/2015       Past Surgical History:   Procedure Laterality Date    ABDOMINAL AORTIC ANEURYSM 
[facility]  Nephrology Progress Note    Patient's Name: Cleveland Thompson    History of Present Illness from 11/21 consult note:    \"Cleveland Thompson is a 79 y.o. male with a past medical history of DVT, hypertension, and gout.  He presented to the ED on 11/20 for concerns of hypotension and bradycardia.  He reportedly underwent an angioplasty on 11/20. Vital signs on 11/20 includes temperature 96.9, respirations 16, pulse 45, BP 82/39, and he was 92% SpO2.  Lab data on 11/20 includes potassium 5, BUN 53, creatinine 9.3, anion gap 18, proBNP 70,209, and hemoglobin 12.  He had a chest x-ray on 11/20 which showed increasing parenchymal density projecting over the right mid lower lung concerning for pneumonia and/or atelectasis. EP has been consulted to see the patient for concerns of bradycardia.   Nephrology has been consulted to see the patient for ESRD.  He is known to our service and dialyzes as an outpatient at [facility] via left arm AV fistula.  At present, patient was seen and examined in the ICU.  He is a questionable historian at this time.  He denies any palpitations.\"    Subjective:    11/29/2024: Patient was seen and examined. He is resting in the bed, wife at the bedside. She reports that he did not sleep last night and has therefore been more restless and talkative today. He is unable to answer questions appropriately. Chart reviewed, no acute events overnight.         Patient Active Problem List   Diagnosis    Aneurysm of abdominal aorta ([facility])    History of DVT (deep vein thrombosis)    Iliac artery aneurysm, bilateral ([facility])    History of tobacco use    Open leg wound, right, subsequent encounter    Anemia of chronic renal failure, stage 5 ([facility])    Encounter regarding vascular access for dialysis for ESRD ([facility])    End stage renal disease ([facility])    Dizziness    Sinus bradycardia    Cervical compression fracture, initial encounter ([facility])    Closed nondisplaced fracture of fourth cervical vertebra 
[facility]  Nephrology Progress Note    Patient's Name: Cleveland Thompson    History of Present Illness from 11/21 consult note:    \"Cleveland Thompson is a 79 y.o. male with a past medical history of DVT, hypertension, and gout.  He presented to the ED on 11/20 for concerns of hypotension and bradycardia.  He reportedly underwent an angioplasty on 11/20. Vital signs on 11/20 includes temperature 96.9, respirations 16, pulse 45, BP 82/39, and he was 92% SpO2.  Lab data on 11/20 includes potassium 5, BUN 53, creatinine 9.3, anion gap 18, proBNP 70,209, and hemoglobin 12.  He had a chest x-ray on 11/20 which showed increasing parenchymal density projecting over the right mid lower lung concerning for pneumonia and/or atelectasis. EP has been consulted to see the patient for concerns of bradycardia.   Nephrology has been consulted to see the patient for ESRD.  He is known to our service and dialyzes as an outpatient at [facility] via left arm AV fistula.  At present, patient was seen and examined in the ICU.  He is a questionable historian at this time.  He denies any palpitations.\"    Subjective:    11/26/2024: Patient was seen and examined.  He is lethargic, No acute distress.  Visitor at bedside.  He had HD yesterday with 1.2 L net removed.    PMH:    Past Medical History:   Diagnosis Date    Aneurysm of abdominal aorta ([facility]) 9/27/2012    Deep vein thrombosis of calf ([facility])     R calf vein thrombosis involving post tibial vein with extension in close proximity  to popliteal vein    Gout     History of DVT (deep vein thrombosis) 3/12/2015    History of tobacco use 7/27/2017    Hypertension     Iliac artery aneurysm, bilateral ([facility]) 6/29/2017    Loss of weight     diet and exercise    Right leg DVT ([facility]) 3/12/2015       Past Surgical History:   Procedure Laterality Date    ABDOMINAL AORTIC ANEURYSM REPAIR      2018    DIALYSIS FISTULA CREATION Left 11/17/2023    creation AVF left arm performed by Brenda 
[facility]  Nephrology Progress Note    Patient's Name: Cleveland Thompson    History of Present Illness from 11/21 consult note:    \"Cleveland Thompson is a 79 y.o. male with a past medical history of DVT, hypertension, and gout.  He presented to the ED on 11/20 for concerns of hypotension and bradycardia.  He reportedly underwent an angioplasty on 11/20. Vital signs on 11/20 includes temperature 96.9, respirations 16, pulse 45, BP 82/39, and he was 92% SpO2.  Lab data on 11/20 includes potassium 5, BUN 53, creatinine 9.3, anion gap 18, proBNP 70,209, and hemoglobin 12.  He had a chest x-ray on 11/20 which showed increasing parenchymal density projecting over the right mid lower lung concerning for pneumonia and/or atelectasis. EP has been consulted to see the patient for concerns of bradycardia.   Nephrology has been consulted to see the patient for ESRD.  He is known to our service and dialyzes as an outpatient at [facility] via left arm AV fistula.  At present, patient was seen and examined in the ICU.  He is a questionable historian at this time.  He denies any palpitations.\"    Subjective:    12/5/2024: Patient was seen and examined.  He is awake, no acute distress.     PMH:    Past Medical History:   Diagnosis Date    Aneurysm of abdominal aorta ([facility]) 9/27/2012    Deep vein thrombosis of calf ([facility])     R calf vein thrombosis involving post tibial vein with extension in close proximity  to popliteal vein    Gout     History of DVT (deep vein thrombosis) 3/12/2015    History of tobacco use 7/27/2017    Hypertension     Iliac artery aneurysm, bilateral ([facility]) 6/29/2017    Loss of weight     diet and exercise    Right leg DVT ([facility]) 3/12/2015       Past Surgical History:   Procedure Laterality Date    ABDOMINAL AORTIC ANEURYSM REPAIR      2018    DIALYSIS FISTULA CREATION Left 11/17/2023    creation AVF left arm performed by Mariana Gutierrez MD at [facility] [place] [facility] OR    EP DEVICE PROCEDURE N/A 01/26/2024    
[facility]  Nephrology Progress Note    Patient's Name: Cleveland Thompson    History of Present Illness from 11/21 consult note:    \"Cleveland Thompson is a 79 y.o. male with a past medical history of DVT, hypertension, and gout.  He presented to the ED on 11/20 for concerns of hypotension and bradycardia.  He reportedly underwent an angioplasty on 11/20. Vital signs on 11/20 includes temperature 96.9, respirations 16, pulse 45, BP 82/39, and he was 92% SpO2.  Lab data on 11/20 includes potassium 5, BUN 53, creatinine 9.3, anion gap 18, proBNP 70,209, and hemoglobin 12.  He had a chest x-ray on 11/20 which showed increasing parenchymal density projecting over the right mid lower lung concerning for pneumonia and/or atelectasis. EP has been consulted to see the patient for concerns of bradycardia.   Nephrology has been consulted to see the patient for ESRD.  He is known to our service and dialyzes as an outpatient at [facility] via left arm AV fistula.  At present, patient was seen and examined in the ICU.  He is a questionable historian at this time.  He denies any palpitations.\"    Subjective:    12/2/2024: Patient was seen and examined.  He is awake, no acute distress.    PMH:    Past Medical History:   Diagnosis Date    Aneurysm of abdominal aorta (HCC) 9/27/2012    Deep vein thrombosis of calf (HCC)     R calf vein thrombosis involving post tibial vein with extension in close proximity  to popliteal vein    Gout     History of DVT (deep vein thrombosis) 3/12/2015    History of tobacco use 7/27/2017    Hypertension     Iliac artery aneurysm, bilateral (HCC) 6/29/2017    Loss of weight     diet and exercise    Right leg DVT (HCC) 3/12/2015       Past Surgical History:   Procedure Laterality Date    ABDOMINAL AORTIC ANEURYSM REPAIR      2018    DIALYSIS FISTULA CREATION Left 11/17/2023    creation AVF left arm performed by Mariana Gutierrez MD at [facility] [place] OR    EP DEVICE PROCEDURE N/A 01/26/2024    
This RN went to administer Depakote sprinkles to the pt with morning med pass and the patient smacked the pills out of my hand. This RN was only able to administer half of the dose.   
Was attempting iv access on pt roommate when pt wife was noted to be yelling at pt multiple times and then heard a loud smack and pt is noted to be in inocente wrist restraints at this time and unable to move his arms   
When attempting to remove bilateral soft wrist restraints patient began pulling at lines and moving R arm after having a new pacemaker placed. Restraints were put back into place for patient safety. Care ongoing.   
S1 and S2 and no carotid bruits  Abdomen: soft, non-tender, non-distended, normal bowel sounds, no masses or organomegaly  Extremities: no cyanosis, no clubbing and no edema  Neurologic:  limitted due to AMS.        Recent Labs     12/03/24 0419 12/04/24 0440 12/05/24 0423    138 138   K 5.3* 4.4 4.3   CL 97* 95* 97*   CO2 27 28 31*   BUN 70* 34* 46*   CREATININE 9.1* 6.1* 7.6*   GLUCOSE 64* 60* 82   CALCIUM 8.5* 8.6 8.8       Recent Labs     12/03/24 0419 12/04/24 0440 12/05/24 0423   WBC 7.2 7.2 7.9   RBC 2.67* 2.79* 2.88*   HGB 8.4* 8.5* 8.9*   HCT 26.4* 27.8* 28.7*   MCV 98.9 99.6 99.7   MCH 31.5 30.5 30.9   MCHC 31.8* 30.6* 31.0*   RDW 14.4 14.2 14.4    237 241   MPV 9.8 10.0 9.6       Radiology:     Assessment:    Principal Problem:    Hypoperfusion of brain  Active Problems:    Anemia of chronic renal failure, stage 5 (HCC)    End stage renal disease (HCC)    Bradycardia    Sinus node dysfunction (HCC)    Moderate protein-calorie malnutrition (HCC)    Delirium    Anoxic encephalopathy (HCC)    Dementia (HCC)    Hypotension    Pneumonitis, aspiration (HCC)  Resolved Problems:    * No resolved hospital problems. *      Plan:  1.  Delirious with dementia: Patient get agitated quickly and neurology consult done MRI shows brain atrophy.  EEG pending.  2.  Bradycardia: Pacemaker placed in November 22.  3.  Pneumonia: Community-acquired pneumonia: Continue Rocephin and azithromycin.  4.  Hyperlipidemia: Continue Crestor.  6.  ESRD: Patient received dialysis today and nephrology on the [facility] today      NOTE: This report was transcribed using voice recognition software. Every effort was made to ensure accuracy; however, inadvertent computerized transcription errors may be present.  Electronically signed by Ricky Alejandro MD on 12/5/2024 at 10:46 AM     
awake, confused HEENT:  Conjunctivae/corneas clear.   Neck: Supple. No jugular venous distention.   Respiratory: symmetrical; clear to auscultation bilaterally; no wheezes; no rhonchi; no rales  Cardiovascular: rhythm regular; rate controlled; no murmurs  Abdomen: Soft, nontender, nondistended  Extremities:  peripheral pulses present; no peripheral edema; no ulcers  Musculoskeletal: No clubbing, cyanosis, no bilateral lower extremity edema. Brisk capillary refill.   Skin:  No rashes  on visible skin  Neurologic: awake, confused    ASSESSMENT and PLAN:  Delirium with dementia  Patient delirious with bedside sitter in place, likely hospital-acquired delirium  Ensure adequate lighting, avoid physical restraint, maintain normal sleep-wake cycle, continue melatonin 5 mg  Continue Depakote sprinkle 250mg 3 times a day  Depakote level 35  Check ammonia level was normal  As needed Geodon 10 mg every 12 hours  Completed course of IV Rocephin for 7 days, azithromycin for 5 days  TSH normal, vitamin B12 and folate  MRI brain without contrast reordered, wife in agreement  Psychiatry consulted for any further recommendation as patient has persistent delirium  Neurology recommended for further recommendation     Insomnia  Patient has not had any sleep since few days, melatonin is not helping him  Added Ambien 5 mg, patient became more somnolent will decrease dose to 2.5 mg x 1 days     Bradycardic   Cardiogenic Shock  Syncope  Underwent pacer insertion 11/22.   Continue home midodrine      Pneumonia, community acquired  Status post Rocephin and azithromycin     HLD  Crestor     Hypoglycemia  Monitor blood sugar   Hypoglycemic protocol in place      Fall  CT head negative      ESRD  Nephrology following      Hyperkalemia  Resolved  Managed with dialysis      Anemia of chronic Disease   Monitor  Transfuse for hemoglobin <7     Atrial Fibrillation   PPM  Telemetry  Toprol             DVT Prophylaxis [] Lovenox, []  Heparin, [] SCDs, 
command    ASSESSMENT and PLAN:  Delirium  Patient delirious with bedside sitter in place, likely hospital-acquired delirium  Ensure adequate lighting, avoid physical restraint, maintain normal sleep-wake cycle, continue melatonin 5 mg  Start Depakote sprinkle 125 mg 3 times a day  As needed Geodon 10 mg every 12 hours  On IV Rocephin 1 g every 24 hours, completed azithromycin 500 mg x 5 doses  TSH normal, vitamin B12 and folate  MRI brain without contrast sent     Bradycardic   Cardiogenic Shock  Syncope  Underwent pacer insertion 11/22.   Continue home midodrine      Pneumonia, community acquired  Completed course of Zithromax, Rocephin last day     HLD  Crestor     Hypoglycemia  Monitor blood sugar   Hypoglycemic protocol in place      Fall  CT head negative      ESRD  Nephrology following      Hyperkalemia  Resolved  Managed with dialysis      Anemia of chronic Disease   Monitor  Transfuse for hemoglobin <7     Atrial Fibrillation   PPM  Telemetry  Toprol          DVT Prophylaxis [] Lovenox, []  Heparin, [] SCDs, [] Ambulation   GI Prophylaxis [] PPI,  [] H2 Blocker,  [] Carafate,  [] Diet/Tube Feeds   Disposition Patient requires continued admission due to delirium evaluation and treatment   MDM [] Low, [] Moderate,[]  High  Patient's risk as above due to        Medications:  REVIEWED DAILY    Infusion Medications    sodium chloride      dextrose       Scheduled Medications    divalproex  125 mg Oral 3 times per day    melatonin  5 mg Oral Nightly    [Held by provider] metoprolol tartrate  12.5 mg Oral BID    apixaban  2.5 mg Oral BID    sodium chloride flush  5-40 mL IntraVENous 2 times per day    mirtazapine  7.5 mg Oral Nightly    midodrine  5 mg Oral TID WC    allopurinol  100 mg Oral Daily    lactobacillus  1 capsule Oral Daily    rosuvastatin  10 mg Oral Nightly    atropine  1 mg IntraVENous Once    cefTRIAXone (ROCEPHIN) IV  1,000 mg IntraVENous Q24H     PRN Meds: ziprasidone (GEODON) 10 mg in sterile 
04:21 AM    ALKPHOS 77 2024 04:21 AM    AST 19 2024 04:21 AM    ALT 9 2024 04:21 AM     Hepatic Function Panel:    Lab Results   Component Value Date/Time    ALKPHOS 77 2024 04:21 AM    ALT 9 2024 04:21 AM    AST 19 2024 04:21 AM    BILITOT 0.3 2024 04:21 AM     HgBA1c:    Lab Results   Component Value Date/Time    LABA1C 5.2 2024 09:39 PM     FLP:    Lab Results   Component Value Date/Time    TRIG 53 2024 04:21 AM    HDL 32 2024 04:21 AM     CTH: 1. No acute intracranial abnormality.  2. Moderate cerebral atrophy with periventricular white matter changes.  3. Nonspecific white matter changes, likely related to chronic microvascular  ischemic disease.    CT Chest: 1. Mild multi lobar aspiration pneumonitis.  2. Small bilateral pleural effusions and associated compressive atelectasis.  3. Mild cardiomegaly.  4. Mildly prominent mediastinal lymph nodes, likely reactive.    All labs and imaging studies reviewed independently today     Assessment:     Metabolic encephalopathy, Possible anoxic brain injury secondary to hypotension episodes   ---MRI brain pending     Dementia   --- significant brain atrophy noted on CTH precursor to dementia   --- extensive   --- no brain reserve and will become easily agitated and confused and will be sensitive to BP changes, brain hypoperfusion, metabolic changes, infections     Mild multi lobar aspiration pneumonitis  --- confirmed by CT chest  --- likely contributing and exacerbating his underlying dementia     Plan:     MRI brain pending  Neurology to follow       ABRIL Fuentes CNP  12:46 PM  2024  
chloride flush  5-40 mL IntraVENous 2 times per day    mirtazapine  7.5 mg Oral Nightly    midodrine  5 mg Oral TID WC    allopurinol  100 mg Oral Daily    lactobacillus  1 capsule Oral Daily    rosuvastatin  10 mg Oral Nightly    atropine  1 mg IntraVENous Once    cefTRIAXone (ROCEPHIN) IV  1,000 mg IntraVENous Q24H     PRN Meds: ziprasidone (GEODON) 10 mg in sterile water 0.5 mL injection, sodium chloride flush, sodium chloride, albumin human 25%, glucose, dextrose bolus **OR** dextrose bolus, glucagon (rDNA), dextrose, dextrose, ondansetron **OR** ondansetron, polyethylene glycol, acetaminophen **OR** acetaminophen    Labs:     Recent Labs     11/25/24  0605 11/26/24  0430 11/27/24  0559   WBC 8.3 7.5 7.8   HGB 8.5* 8.3* 8.8*   HCT 27.2* 26.8* 29.6*    154 178       Recent Labs     11/25/24  0605 11/26/24  0430 11/27/24  0559    139 136   K 4.1 3.9 4.5    97* 97*   CO2 25 30* 25   BUN 45* 22 38*   CREATININE 7.3* 5.1* 7.2*   CALCIUM 8.1* 8.4* 7.9*   PHOS 5.7* 3.7 4.8*       Recent Labs     11/25/24  0605 11/26/24 0430 11/27/24  0559   ALKPHOS 70 70 69   ALT 9 11 12   AST 18 20 22   BILITOT 0.2 0.3 0.2       No results for input(s): \"INR\" in the last 72 hours.    No results for input(s): \"CKTOTAL\", \"TROPONINI\" in the last 72 hours.    Chronic labs:    Lab Results   Component Value Date    CHOL 121 09/17/2022    TRIG 61 09/17/2022    HDL 66 09/17/2022    TSH 2.82 11/21/2024    PSA 2.41 09/17/2022    INR 1.0 11/17/2023    LABA1C 5.6 09/17/2022       Radiology: REVIEWED DAILY    +++++++++++++++++++++++++++++++++++++++++++++++++  Cole Casillas MD   Hospitalist  [place], [place]  +++++++++++++++++++++++++++++++++++++++++++++++++  NOTE: This report was transcribed using voice recognition software. Every effort was made to ensure accuracy; however, inadvertent computerized transcription errors may be present.       
tablet Take 1 tablet by mouth 2 times daily      Probiotic Product (PROBIOTIC BLEND PO) Take by mouth      allopurinol (ZYLOPRIM) 100 MG tablet Take 1 tablet by mouth daily         Allergies:    Penicillins and Seasonal        Physical Exam:      Patient Vitals for the past 24 hrs:   BP Temp Temp src Pulse Resp SpO2 Weight   11/30/24 1153 (!) 101/51 97.6 °F (36.4 °C) -- 66 16 -- 68.5 kg (151 lb 0.2 oz)   11/30/24 0718 119/62 97 °F (36.1 °C) Axillary 61 18 100 % --   11/30/24 0300 (!) 130/56 96.8 °F (36 °C) Axillary 61 15 100 % --   11/30/24 0200 (!) 131/46 97.7 °F (36.5 °C) Oral 65 18 100 % --   11/30/24 0011 (!) 123/56 97 °F (36.1 °C) -- -- -- 100 % --   11/29/24 2321 -- -- -- 60 16 -- --   11/29/24 1950 (!) 148/67 -- -- -- -- -- --   11/29/24 1927 -- 97.7 °F (36.5 °C) Axillary 63 17 97 % --   11/29/24 1645 (!) 113/43 97 °F (36.1 °C) Oral 60 18 99 % --         Intake/Output Summary (Last 24 hours) at 11/30/2024 1241  Last data filed at 11/30/2024 1153  Gross per 24 hour   Intake 300 ml   Output 1300 ml   Net -1000 ml         General appearance: In no acute distress  Skin: No rashes or lesions on exposed skin  Neck: No JVD  Lungs: Diminished throughout no adventitious sounds  Heart: RRR, no rub  Abdomen: Soft, non-tender, + bowel sounds  Extremities: No edema  Neurologic: Mental status:Drowsy, disoriented    Data:    Recent Labs     11/30/24  0545   WBC 7.9   HGB 8.6*   HCT 27.9*   .7*          Recent Labs     11/28/24  0529      K 4.2   CL 94*   CO2 28   CREATININE 5.4*   BUN 32*   LABGLOM 10*   GLUCOSE 79   CALCIUM 8.3*   PHOS 4.3   MG 2.2       Vit D, 25-Hydroxy   Date Value Ref Range Status   02/21/2022 60 30 - 100 ng/mL Final     Comment:     <20 ng/mL.............Deficient  20-30 ng/mL...........Insufficient   ng/mL..........Sufficient  >100 ng/mL............Toxic         No results found for: \"PTH\"    Recent Labs     11/28/24  0529   ALT 12   AST 20   ALKPHOS 73   BILITOT 0.2 
  11/21/24 2000 (!) 97/47 97.7 °F (36.5 °C) Temporal 71 25 --   11/21/24 1935 (!) 96/51 97 °F (36.1 °C) -- 72 21 99 %   11/21/24 1905 (!) 95/50 -- -- 98 17 --   11/21/24 1800 (!) 103/46 -- -- 74 16 --   11/21/24 1700 (!) 120/50 -- -- 69 18 --   11/21/24 1600 (!) 141/50 -- -- 54 15 (!) 70 %   11/21/24 1500 (!) 130/45 -- -- 56 14 --   11/21/24 1400 (!) 146/57 -- -- 60 14 96 %   11/21/24 1300 (!) 156/59 -- -- 67 17 94 %   11/21/24 1200 (!) 102/38 (!) 96.2 °F (35.7 °C) Axillary (!) 40 23 95 %         Intake/Output Summary (Last 24 hours) at 11/22/2024 1050  Last data filed at 11/22/2024 0922  Gross per 24 hour   Intake 1829.08 ml   Output 1200 ml   Net 629.08 ml       General: Awake, alert, no acute distress  Neck: Cervical collar  Lungs: Clear bilaterally upper, diminished to the bases bilaterally.  Unlabored  CV: Regular rate and rhythm.  No rub  Abd: Soft, nontender, nondistended.  Active bowel sounds  Skin: Warm and dry.  No rash on exposed extremities  Ext: No edema   Neuro: Awake, answers questions appropriately    Data:    Recent Labs     11/20/24 1745 11/21/24  0531 11/22/24  0450   WBC 10.7 9.1 9.9   HGB 12.0* 9.9* 9.2*   HCT 39.3 32.8* 29.1*   .9* 105.1* 100.7*    147 128*       Recent Labs     11/20/24 1745 11/21/24  0531 11/22/24  0450    139 142   K 5.0 5.3* 4.2   CL 94* 101 102   CO2 25 21* 27   CREATININE 9.3* 9.7* 6.2*   BUN 53* 57* 29*   LABGLOM 5* 5* 8*   GLUCOSE 186* 77 114*   CALCIUM 9.0 8.6 8.6   PHOS  --   --  4.6*   MG 2.8*  --  2.3       Vit D, 25-Hydroxy   Date Value Ref Range Status   02/21/2022 60 30 - 100 ng/mL Final     Comment:     <20 ng/mL.............Deficient  20-30 ng/mL...........Insufficient   ng/mL..........Sufficient  >100 ng/mL............Toxic         No results found for: \"PTH\"    Recent Labs     11/20/24  1745 11/22/24  0450   ALT 12 7   AST 14 12   ALKPHOS 113 90   BILITOT 0.3 0.3       No results for input(s): \"LABALBU\" in the last 72 
11/26/24  0430 11/27/24  0559 11/28/24  0529   ALT 11 12 12   AST 20 22 20   ALKPHOS 70 69 73   BILITOT 0.3 0.2 0.2       No results for input(s): \"LABALBU\" in the last 72 hours.    Iron   Date Value Ref Range Status   02/21/2022 59 59 - 158 mcg/dL Final     TIBC   Date Value Ref Range Status   02/21/2022 207 (L) 250 - 450 mcg/dL Final       Vitamin B-12   Date Value Ref Range Status   11/25/2024 887 211 - 946 pg/mL Final       Folate   Date Value Ref Range Status   11/25/2024 4.2 (L) 4.8 - 24.2 ng/mL Final       Lab Results   Component Value Date/Time    COLORU Yellow 03/13/2020 06:10 AM    NITRU Negative 03/13/2020 06:10 AM    GLUCOSEU Negative 03/13/2020 06:10 AM    GLUCOSEU NEGATIVE 08/24/2011 08:00 AM    KETUA Negative 03/13/2020 06:10 AM    UROBILINOGEN 0.2 03/13/2020 06:10 AM    BILIRUBINUR Negative 03/13/2020 06:10 AM    BILIRUBINUR NEGATIVE 08/24/2011 08:00 AM       Lab Results   Component Value Date/Time    PROTEIN 5.6 (L) 11/28/2024 05:29 AM       No components found for: \"URIC\"    No results found for: \"LIPIDPAN\"    Assessment and Plans:    ESRD on HD  outpatient at [facility] via left arm AV fistula  Reportedly s/p angioplasty 11/20  Monitor labs  Continue HD while inpatien    2.  Hyperkalemia  With ESRD  K+ 5.1 on 11/23 --> 4.2 today  Low potassium diet   Monitor labs    3.  Bradycardia  HR noted 30s-40s  S/p dopamine  S/p pacemaker insertion 11/22  EP consulted    4.  Intermittent hypotension  On midodrine 5 mg oral 3 times daily  Monitor BPs    5.  Anemia with CKD  Hemoglobin target 10-12  Hemoglobin 8.8- below target  ANA LAURA managed in the outpatient setting  Monitor H&H    6.  Secondary hyperparathyroidism of renal origin   and phosphorus 4.7 on 10/17 in the outpatient setting  Phosphorus 4.3 today  Low phosphorus diet  Monitor labs    Updated wife at bedside    Dontae Bazan MD  
[place]  +++++++++++++++++++++++++++++++++++++++++++++++++  NOTE: This report was transcribed using voice recognition software. Every effort was made to ensure accuracy; however, inadvertent computerized transcription errors may be present.       
independence and quality of life.    Treatment: OT treatment provided this date includes: Facilitation of bed mobility (rolling, supine<>sit; spine neutrality),  sitting balance at EOB (impacting ADLs; addressing posture, weight shifting), BUE ROM and skilled repositioning in bed addressing joint and skin integrity - skilled cuing on sequencing, hand over hand assist, hand placement, posture, body mechanics, energy conservation techniques and safety.  Therapist facilitated self-care retraining: UB/LB self-care tasks (gown, socks) and grooming tasks while cuing on sequencing, hand over hand assist, modified techniques, posture, safety and energy conservation techniques. Skilled monitoring of HR, O2 sats and pts response to treatment.      Rehab Potential: Fair  for established goals     Patient / Family Goal: none stated      Patient and/or family were instructed on functional diagnosis, prognosis/goals and OT plan of care. Demonstrated Poor understanding.     Eval Complexity: Low    Time In: 834  Time Out: 859  Total Treatment Time: 10 minutes    Min Units   OT Eval Low 97165  x  1   OT Eval Medium 77317      OT Eval High 02926      OT Re-Eval 32225       Therapeutic Ex 74254       Therapeutic Activities 11725  8  1   ADL/Self Care 34594  2     Orthotic Management 31319       Manual [id number]     Neuro Re-Ed 37370       Non-Billable Time          Evaluation Time additionally includes thorough review of current medical information, gathering information on past medical history/social history and prior level of function, interpretation of standardized testing/informal observation of tasks, assessment of data and development of plan of care and goals.          Yuval Carey OTR/L #[id number]    
limited  Elbow Grossly wfl fair  and fair- FMC/dexterity noted during ADL tasks   Strength / ROM assess with functional movement  Hearing: [place]  Sensation: continue to assess  Tone: wfl  Edema:none noted        Comments: Upon arrival pt supine in bed, family at bedside. Pt educated on techniques to increase independence and safety during ADL's, bed mobility, and functional transfers. Skilled interventions to include analysis of bed mobility, transfer and balance training, and analysis of body mechanics to increase safety and independence in functional mobility and transfers and ADLs. Pt would benefit from continued skilled OT to increase safety and independence with completion of ADL/IADL tasks for functional independence and quality of life.    At end of session pt left resting in bed, bed alarm on, family present in room call light within reach.     Pt has made slow progress towards set goals.     Continue with current plan of care    Treatment Time In:1125            Treatment Time Out: 1149             Treatment Charges: Mins Units   Ther Ex  97738     Manual Therapy 40329     Thera Activities 09206 24 2   ADL/Home Mgt 80150     Neuro Re-ed 78240     Group Therapy      Orthotic manage/training  01812     Non-Billable Time     Total Timed Treatment 24 2     Tapan CISNEROS/ANKIT Ruiz PUCKETT/L 29775   
  Component Value Date/Time    PROTEIN 5.3 (L) 11/27/2024 05:59 AM       No components found for: \"URIC\"    No results found for: \"LIPIDPAN\"    Assessment and Plans:    ESRD on HD  outpatient at [facility] TTS via left arm AV fistula  Reportedly s/p angioplasty 11/20  Monitor labs  Continue HD while inpatient-HD today    2.  Hyperkalemia  With ESRD  K+ 5.1 on 11/23 --> 4.5 today  Low potassium diet   Monitor labs    3.  Bradycardia  HR noted 30s-40s  S/p dopamine  S/p pacemaker insertion 11/22  EP consulted    4.  Intermittent hypotension  On midodrine 5 mg oral 3 times daily  Monitor BPs    5.  Anemia with CKD  Hemoglobin target 10-12  Hemoglobin 8.8- below target  ANA LAURA managed in the outpatient setting  Monitor H&H    6.  Secondary hyperparathyroidism of renal origin   and phosphorus 4.7 on 10/17 in the outpatient setting  Phosphorus 4.8 today  Low phosphorus diet  Monitor labs    Andrew Mo, APRN - CNP  Pt seen and examined on rounds with andrew soto  Agree with above  Dw wife  S hd today  Sp pacer  Guy Smapson MD     
and radiologic tests personally reviewed agree with above.    Delirium persists being medicated with Depakote, Geodon; monitor response    Dontae Bazan MD        
EtOH: Avoid abuse/binge.    Hypotension   - Continue midodrine.    ESRD on HD via left UE AVF (7/2023)  - Management per nephrology.    Thank you for allowing me to participate in your patient's care.  Please call me if there are any questions.    I spent 30 minutes of critical care time.      Scout Carlisle, DO   Cardiac Electrophysiology  [facility]  [facility]  
pending  Continue cervical collar    Delirium precautions    Day-night orientation  Pain control    CARDIOVASCULAR:    Most Recent Hemodynamics: Reviewed    Bradycardia/heart block  Cardiogenic shock - resolved    Cardiovascular support: none  Maintain MAP > 65    ECHO with hyperdynamic EF    S/p Permanent pacemaker placement 11/22    RESPIRATORY:    Data/Assessment:ABG, CXR - Reviewed  Vent Settings: na    Pneumonia    Empiric antibiotics   bronchodilators  Pulmonary hygiene    INFECTIOUS DISEASE:    Pneumonia    Blood cultures: NGTD  Respiratory cultures: Pending    Empiric Ceftriaxone/Azithromycin    RENAL:    ESRD on hemodialysis  Hyperkalemia  Metabolic acidosis    Patient received [facility]  Nephrology consulted for hemodialysis    Trend renal function and electrolytes, replete per protocol.   Trend urine output     GASTROINTESTINAL    Diet    ENDOCRINE:    TSH wnl  Hypoglycemia    Blood glucose checks  Dextrose as needed    Target glucose range 140-180    HEME/ONC:    History of DVT    Eliquis on hold for possible pacemaker placement.  Restart on Monday 11/25 per EP.    Transfuse PRBC for Hb < 7 gm/dL  Transfuse for Platelets < 10.    MSK/Skin:       PT/OT: Early mobilization as appropriate    PPX: SCD    Lines: Right femoral central line placed 11/21 -plan remove once patient off dopamine.    Case was discussed with care team    Family updated patient and wife by bedside    Gabriele Scott MD, MS  Division of Pulmonary & Critical Care  [facility] [place] [facility], [facility]

## 2024-12-09 ENCOUNTER — TELEPHONE (OUTPATIENT)
Dept: NEUROSURGERY | Age: 79
End: 2024-12-09

## 2024-12-09 NOTE — TELEPHONE ENCOUNTER
Patients wife called stating he tested positive for covid on Saturday 12/7. His appt with us on 12/11 was cancelled per providers request. He is currently at [facility] and will need X-rays prior to appt with us. We are fully booked and he has dialysis Tuesdays & Thursdays so time is limited. Would it be ok to contact the facility and have them complete his X-rays for a PA to review them via phone with his nurse? Please advise.

## 2024-12-09 NOTE — TELEPHONE ENCOUNTER
So unfortunately the facility uses [employer] [facility] for their X-rays. I have contacted [facility] and they are unable to have us view the imaging with an online portal, they would have to produce a disc for the patient to bring in. Is there anywhere acceptable to put the patient on (other than Tuesdays or Thursdays) to bring the disc in and follow up?

## 2024-12-09 NOTE — TELEPHONE ENCOUNTER
Called patiends wife per her request to give her appointment information for upcoming visit. No answer-L/M to return call.

## 2024-12-10 ENCOUNTER — TELEPHONE (OUTPATIENT)
Dept: NEUROSURGERY | Age: 79
End: 2024-12-10

## 2024-12-10 NOTE — TELEPHONE ENCOUNTER
Mrs. Thompson called. I gave her appt date time and location and advised her to bring disc from facility for xray.

## 2024-12-13 ENCOUNTER — NURSE ONLY (OUTPATIENT)
Dept: NON INVASIVE DIAGNOSTICS | Age: 79
End: 2024-12-13

## 2024-12-13 DIAGNOSIS — Z95.0 PACEMAKER: Primary | ICD-10-CM

## 2024-12-13 NOTE — PATIENT INSTRUCTIONS
Continue arm restrictions until 1/3/2025      Call if any signs or symptoms of infection [phone] ext: [phone]  Fevers, chills, redness, swelling or drainage.       Daniel ( PRICILA) [employer] support ( home monitoring) [phone]

## 2024-12-18 ENCOUNTER — OFFICE VISIT (OUTPATIENT)
Dept: NEUROSURGERY | Age: 79
End: 2024-12-18
Payer: MEDICARE

## 2024-12-18 VITALS
RESPIRATION RATE: 16 BRPM | HEART RATE: 66 BPM | DIASTOLIC BLOOD PRESSURE: 60 MMHG | WEIGHT: 138 LBS | OXYGEN SATURATION: 96 % | SYSTOLIC BLOOD PRESSURE: 102 MMHG | HEIGHT: 70 IN | TEMPERATURE: 97.7 F | BODY MASS INDEX: 19.76 KG/M2

## 2024-12-18 DIAGNOSIS — S12.391D OTHER CLOSED NONDISPLACED FRACTURE OF FOURTH CERVICAL VERTEBRA WITH ROUTINE HEALING, SUBSEQUENT ENCOUNTER: Primary | ICD-10-CM

## 2024-12-18 PROCEDURE — 1159F MED LIST DOCD IN RCRD: CPT | Performed by: STUDENT IN AN ORGANIZED HEALTH CARE EDUCATION/TRAINING PROGRAM

## 2024-12-18 PROCEDURE — 99213 OFFICE O/P EST LOW 20 MIN: CPT | Performed by: STUDENT IN AN ORGANIZED HEALTH CARE EDUCATION/TRAINING PROGRAM

## 2024-12-18 PROCEDURE — 1123F ACP DISCUSS/DSCN MKR DOCD: CPT | Performed by: STUDENT IN AN ORGANIZED HEALTH CARE EDUCATION/TRAINING PROGRAM

## 2024-12-18 NOTE — PROGRESS NOTES
Hospital Follow-up     Subjective: Cleveland Thompson is a 78 y/o male who was orginally seen in the hospital after a mechanical fall. Patient was found to have suffered a C4 body fracture. He was placed in a C-Collar. He presents today for 6 week follow up.     Patient presents from [facility]. Patient states he is doing well. He denies any neck pain. No pain down the arms. No numbness or weakness. C-Collar complaint. XR reviewed.      Physical Exam:              WDWN, no apparent distress              Non-labored breathing               Vitals Stable              Alert and oriented x3              CN 3-12 intact              PERRL              EOMI              TORRES well              Motor strength symmetric              Sensation to LT intact bilaterally   In C-Collar                Imagin2024 XR Cervical Spine  C4 fracture hard to visualize as only AP view was done, Stable alignment.     Assessment: This is a 79 y.o.  male presenting for a 6 week follow-up s/p C4 fracture.      Plan:  -Pain control and expectations discussed  -Continue C-Collar and restrictions   -OARRS report reviewed   -Follow-up in neurosurgery clinic in 6 weeks with repeat XR-Patient would like to go to [facility]  -Call or return to neurosurgery office sooner if symptoms worsen or if new issues arise in the interim.    Electronically signed by Elizabeth Antonio PA-C on 2024 at 4:08 PM

## 2024-12-20 ENCOUNTER — TELEPHONE (OUTPATIENT)
Dept: NEUROSURGERY | Age: 79
End: 2024-12-20

## 2024-12-20 ENCOUNTER — CLINICAL DOCUMENTATION (OUTPATIENT)
Dept: NEUROSURGERY | Age: 79
End: 2024-12-20

## 2024-12-20 NOTE — TELEPHONE ENCOUNTER
Patient's wife, Kathleen, called.  She wants a fax sent to [facility] regarding instructions for collar like being able to take it off to shower and do a swallow test.    Fax

## 2024-12-23 NOTE — TELEPHONE ENCOUNTER
Patients wife called back, I informed her the letter was written after the staff left for the day. Letter faxed to [facility].

## 2024-12-26 ENCOUNTER — TELEPHONE (OUTPATIENT)
Dept: NEUROSURGERY | Age: 79
End: 2024-12-26

## 2024-12-26 DIAGNOSIS — S12.391D OTHER CLOSED NONDISPLACED FRACTURE OF FOURTH CERVICAL VERTEBRA WITH ROUTINE HEALING, SUBSEQUENT ENCOUNTER: Primary | ICD-10-CM

## 2024-12-26 NOTE — TELEPHONE ENCOUNTER
Patients wife Kathleen called requesting for us to order a new cervical collar for Cleveland as his is falling apart. Please advise.

## 2025-01-21 PROCEDURE — 93294 REM INTERROG EVL PM/LDLS PM: CPT | Performed by: INTERNAL MEDICINE

## 2025-01-21 PROCEDURE — 93296 REM INTERROG EVL PM/IDS: CPT | Performed by: INTERNAL MEDICINE

## 2025-01-29 DIAGNOSIS — S12.391D OTHER CLOSED NONDISPLACED FRACTURE OF FOURTH CERVICAL VERTEBRA WITH ROUTINE HEALING, SUBSEQUENT ENCOUNTER: ICD-10-CM

## 2025-01-31 ENCOUNTER — OFFICE VISIT (OUTPATIENT)
Dept: NEUROSURGERY | Age: 80
End: 2025-01-31
Payer: MEDICARE

## 2025-01-31 VITALS
OXYGEN SATURATION: 96 % | BODY MASS INDEX: 19.76 KG/M2 | DIASTOLIC BLOOD PRESSURE: 84 MMHG | WEIGHT: 138 LBS | SYSTOLIC BLOOD PRESSURE: 133 MMHG | HEIGHT: 70 IN | TEMPERATURE: 97.2 F | HEART RATE: 86 BPM

## 2025-01-31 DIAGNOSIS — S12.391D OTHER CLOSED NONDISPLACED FRACTURE OF FOURTH CERVICAL VERTEBRA WITH ROUTINE HEALING, SUBSEQUENT ENCOUNTER: Primary | ICD-10-CM

## 2025-01-31 PROCEDURE — 1126F AMNT PAIN NOTED NONE PRSNT: CPT | Performed by: STUDENT IN AN ORGANIZED HEALTH CARE EDUCATION/TRAINING PROGRAM

## 2025-01-31 PROCEDURE — 1159F MED LIST DOCD IN RCRD: CPT | Performed by: STUDENT IN AN ORGANIZED HEALTH CARE EDUCATION/TRAINING PROGRAM

## 2025-01-31 PROCEDURE — 99213 OFFICE O/P EST LOW 20 MIN: CPT | Performed by: STUDENT IN AN ORGANIZED HEALTH CARE EDUCATION/TRAINING PROGRAM

## 2025-01-31 PROCEDURE — 1123F ACP DISCUSS/DSCN MKR DOCD: CPT | Performed by: STUDENT IN AN ORGANIZED HEALTH CARE EDUCATION/TRAINING PROGRAM

## 2025-01-31 NOTE — PROGRESS NOTES
Office Follow-up     Subjective: Cleveland Thompson is a 80 y/o male who was orginally seen in the hospital after a mechanical fall. Patient was found to have suffered a C4 body fracture. He was placed in a C-Collar. He presents today for 3 month follow up.     Patient states he is doing well. He denies any neck pain. No pain down the arms. No numbness or weakness. C-Collar complaint. XR reviewed.      Physical Exam:              WDWN, no apparent distress              Non-labored breathing               Vitals Stable              Alert and oriented x3              CN 3-12 intact              PERRL              EOMI              TORRES well              Motor strength symmetric              Sensation to LT intact bilaterally   In C-Collar                Imagin2025 XR Cervical Spine  C4 fracture hard to visualize as only AP view was done, Stable alignment.     Assessment: This is a 79 y.o.  male presenting for a 3 month follow-up s/p C4 fracture.      Plan:  -Pain control and expectations discussed  -Can discontinue C-Collar and restrictions   -OARRS report reviewed   -Follow-up in neurosurgery clinic prn  -Call or return to neurosurgery office sooner if symptoms worsen or if new issues arise in the interim.    Electronically signed by Elizabeth Antonio PA-C on 2025 at 6:19 PM

## 2025-03-03 ENCOUNTER — OFFICE VISIT (OUTPATIENT)
Dept: VASCULAR SURGERY | Age: 80
End: 2025-03-03
Payer: MEDICARE

## 2025-03-03 DIAGNOSIS — N18.6 ENCOUNTER REGARDING VASCULAR ACCESS FOR DIALYSIS FOR ESRD (HCC): Primary | ICD-10-CM

## 2025-03-03 DIAGNOSIS — Z99.2 ENCOUNTER REGARDING VASCULAR ACCESS FOR DIALYSIS FOR ESRD (HCC): Primary | ICD-10-CM

## 2025-03-03 PROCEDURE — 1123F ACP DISCUSS/DSCN MKR DOCD: CPT | Performed by: PHYSICIAN ASSISTANT

## 2025-03-03 PROCEDURE — 99212 OFFICE O/P EST SF 10 MIN: CPT | Performed by: PHYSICIAN ASSISTANT

## 2025-03-03 PROCEDURE — 1159F MED LIST DOCD IN RCRD: CPT | Performed by: PHYSICIAN ASSISTANT

## 2025-03-03 NOTE — PROGRESS NOTES
told it is not functioning well and we can plan for fistulogram/plasty with DCB  they are using it for dialysis  they are not experiencing symptoms of steal syndrome  f/u in 6 months for followup  Patient understands to call with any issues related to their access    Pt seen and plan reviewed with Dr. Gutierrez.     Yolanda Rodriguez PA-C

## 2025-03-12 NOTE — PROGRESS NOTES
Arrhythmias: none   Reprogramming included counters cleared.   Overall device function is normal  All device programmable settings were evaluated per above and in the scanned document, along with iterative adjustments (capture thresholds) to assess and select the most appropriate final programming to provide for consistent delivery of the appropriate therapy and to verify function of the device.       Assessment:    1. Sinus node dysfuncion.   - Junction bradycardia and sinus arrest associated with syncope.     2. Pacemaker in situ   - DOI 01/26/2024  - [employer]       3. Paroxysmal atrial fibrillation   - During anesthesia prior to abdominal aortic aneurysm stenting at [facility] in 2018.  -On Eliquis and continues on this without bleeding issues.    4. Paroxysmal supraventricular tachycardia  - 48 hour Holter monitor on 3/1/22 which showed PACs, PVCs and 5 SVTs with longest duration of 16 beats.  - Fourteen day cardiac monitor 7/18/23 which showed 25 Supraventricular Tachycardia runs occurred, the run with the longest lasting 14 beats. No VT or PAF. No pause or AV block. No triggered events or symptoms reported.     5. Abdominal aortic aneurysm   - 2/2018: pEVAR, L IIA coil embolization and coverage, R anterior branch coil embolization with iliac branch    6. Peripheral vascular disease   - 5/2018: L AK pop-to-BK pop bypass with 8mm ringed PTFE for pop aneurysm  - 10/2018: R AK pop-to-BK pop bypass with 8mm ringed PTFE for pop aneurysm    7. Hypertension  - Now hypotension required Midodrine.     8. Hyperlipidemia  - On Crestor.    9. ESRD   - On dialysis.     10. History of DVT  .    11. Hyperuricemia  - On Allopurinol.    12.  Close cervical C4 compression fracture due to fall\    13. Dementia     Recommendations:  Continue Eliquis 2.5mg BID   Agree with Midodrine 5mg BID.   Normal device function.   Remotes Q 91 days   Follow up in office 6 month with Dr. Gonsalves or sooner if needed.       I have spent a total of 40

## 2025-03-14 ENCOUNTER — OFFICE VISIT (OUTPATIENT)
Dept: NON INVASIVE DIAGNOSTICS | Age: 80
End: 2025-03-14

## 2025-03-14 VITALS
SYSTOLIC BLOOD PRESSURE: 118 MMHG | DIASTOLIC BLOOD PRESSURE: 58 MMHG | HEART RATE: 60 BPM | HEIGHT: 67 IN | BODY MASS INDEX: 23.61 KG/M2 | RESPIRATION RATE: 14 BRPM | WEIGHT: 150.4 LBS | TEMPERATURE: 98.4 F

## 2025-03-14 DIAGNOSIS — Z95.0 PACEMAKER: ICD-10-CM

## 2025-03-14 DIAGNOSIS — I51.9 HEART PROBLEM: Primary | ICD-10-CM

## 2025-03-17 ENCOUNTER — TELEPHONE (OUTPATIENT)
Dept: PRIMARY CARE CLINIC | Age: 80
End: 2025-03-17

## 2025-03-17 NOTE — TELEPHONE ENCOUNTER
Per TJT pt  ok to schedule new pt appt with him. He would like to see him in a few weeks. LM TO FERNANDO

## 2025-03-31 RX ORDER — MIDODRINE HYDROCHLORIDE 5 MG/1
5 TABLET ORAL 2 TIMES DAILY
Qty: 60 TABLET | Refills: 0 | Status: SHIPPED | OUTPATIENT
Start: 2025-03-31

## 2025-03-31 NOTE — TELEPHONE ENCOUNTER
Called and confirmed with June patient is taking 5mg bid. States that they increased around Oct 2024-Jan 2025 due to falls medication and pharmacy pended.

## 2025-04-08 ENCOUNTER — TELEPHONE (OUTPATIENT)
Dept: PRIMARY CARE CLINIC | Age: 80
End: 2025-04-08

## 2025-04-08 RX ORDER — MIRTAZAPINE 7.5 MG/1
7.5 TABLET, FILM COATED ORAL NIGHTLY
Qty: 90 TABLET | Refills: 3 | Status: SHIPPED | OUTPATIENT
Start: 2025-04-08

## 2025-04-08 NOTE — TELEPHONE ENCOUNTER
Wife calling, says he needs a refill of his mirtazapine 7.5 daily, he doesn't have an appointment until the 18th as a new pt. I tried to explain you wouldn't refill until seen but she insisted you would.  They would prefer a 90day supply

## 2025-04-18 ENCOUNTER — OFFICE VISIT (OUTPATIENT)
Dept: PRIMARY CARE CLINIC | Age: 80
End: 2025-04-18
Payer: MEDICARE

## 2025-04-18 VITALS
WEIGHT: 151 LBS | DIASTOLIC BLOOD PRESSURE: 70 MMHG | HEART RATE: 60 BPM | TEMPERATURE: 97.5 F | OXYGEN SATURATION: 99 % | BODY MASS INDEX: 23.7 KG/M2 | SYSTOLIC BLOOD PRESSURE: 128 MMHG | HEIGHT: 67 IN

## 2025-04-18 DIAGNOSIS — Z86.718 HISTORY OF DVT (DEEP VEIN THROMBOSIS): ICD-10-CM

## 2025-04-18 DIAGNOSIS — N18.6 END STAGE RENAL DISEASE (HCC): ICD-10-CM

## 2025-04-18 DIAGNOSIS — N18.5 ANEMIA OF CHRONIC RENAL FAILURE, STAGE 5: ICD-10-CM

## 2025-04-18 DIAGNOSIS — Z98.890 HISTORY OF AAA (ABDOMINAL AORTIC ANEURYSM) REPAIR: ICD-10-CM

## 2025-04-18 DIAGNOSIS — I49.5 SINUS NODE DYSFUNCTION (HCC): Primary | ICD-10-CM

## 2025-04-18 DIAGNOSIS — D63.1 ANEMIA OF CHRONIC RENAL FAILURE, STAGE 5: ICD-10-CM

## 2025-04-18 DIAGNOSIS — R41.89 COGNITIVE CHANGES: ICD-10-CM

## 2025-04-18 DIAGNOSIS — I72.3 ILIAC ARTERY ANEURYSM, BILATERAL: ICD-10-CM

## 2025-04-18 DIAGNOSIS — Z95.0 PACEMAKER: ICD-10-CM

## 2025-04-18 PROCEDURE — 1123F ACP DISCUSS/DSCN MKR DOCD: CPT | Performed by: FAMILY MEDICINE

## 2025-04-18 PROCEDURE — 1159F MED LIST DOCD IN RCRD: CPT | Performed by: FAMILY MEDICINE

## 2025-04-18 PROCEDURE — 99204 OFFICE O/P NEW MOD 45 MIN: CPT | Performed by: FAMILY MEDICINE

## 2025-04-18 RX ORDER — MEMANTINE HYDROCHLORIDE 5 MG/1
5 TABLET ORAL 2 TIMES DAILY
Qty: 60 TABLET | Refills: 1 | Status: SHIPPED | OUTPATIENT
Start: 2025-04-18

## 2025-04-18 ASSESSMENT — ENCOUNTER SYMPTOMS
GASTROINTESTINAL NEGATIVE: 1
ALLERGIC/IMMUNOLOGIC NEGATIVE: 1
EYES NEGATIVE: 1
RESPIRATORY NEGATIVE: 1

## 2025-04-18 ASSESSMENT — PATIENT HEALTH QUESTIONNAIRE - PHQ9
1. LITTLE INTEREST OR PLEASURE IN DOING THINGS: NOT AT ALL
SUM OF ALL RESPONSES TO PHQ QUESTIONS 1-9: 0
SUM OF ALL RESPONSES TO PHQ QUESTIONS 1-9: 0
2. FEELING DOWN, DEPRESSED OR HOPELESS: NOT AT ALL
SUM OF ALL RESPONSES TO PHQ QUESTIONS 1-9: 0
SUM OF ALL RESPONSES TO PHQ QUESTIONS 1-9: 0

## 2025-04-22 PROCEDURE — 93296 REM INTERROG EVL PM/IDS: CPT | Performed by: INTERNAL MEDICINE

## 2025-04-22 PROCEDURE — 93294 REM INTERROG EVL PM/LDLS PM: CPT | Performed by: INTERNAL MEDICINE

## 2025-04-24 ENCOUNTER — TELEPHONE (OUTPATIENT)
Dept: PRIMARY CARE CLINIC | Age: 80
End: 2025-04-24

## 2025-04-24 NOTE — TELEPHONE ENCOUNTER
Pt recently started on memantine on Monday and today has diarrhea, pt doesn't think he can go to dialysis because of it. Spouse asking if maybe twice a day is too much?? Please advise. They havent tried any OTC's.

## 2025-04-28 RX ORDER — MIDODRINE HYDROCHLORIDE 5 MG/1
TABLET ORAL
Qty: 60 TABLET | Refills: 0 | Status: SHIPPED | OUTPATIENT
Start: 2025-04-28

## 2025-05-02 ENCOUNTER — APPOINTMENT (OUTPATIENT)
Dept: CT IMAGING | Age: 80
End: 2025-05-02
Payer: MEDICARE

## 2025-05-02 ENCOUNTER — HOSPITAL ENCOUNTER (EMERGENCY)
Age: 80
Discharge: HOME | End: 2025-05-02
Attending: STUDENT IN AN ORGANIZED HEALTH CARE EDUCATION/TRAINING PROGRAM
Payer: MEDICARE

## 2025-05-02 ENCOUNTER — APPOINTMENT (OUTPATIENT)
Dept: GENERAL RADIOLOGY | Age: 80
End: 2025-05-02
Payer: MEDICARE

## 2025-05-02 VITALS
BODY MASS INDEX: 24.11 KG/M2 | RESPIRATION RATE: 11 BRPM | OXYGEN SATURATION: 95 % | SYSTOLIC BLOOD PRESSURE: 107 MMHG | TEMPERATURE: 97.5 F | HEIGHT: 66 IN | HEART RATE: 60 BPM | WEIGHT: 150 LBS | DIASTOLIC BLOOD PRESSURE: 48 MMHG

## 2025-05-02 DIAGNOSIS — N18.6 STAGE 5 CHRONIC KIDNEY DISEASE ON CHRONIC DIALYSIS (HCC): ICD-10-CM

## 2025-05-02 DIAGNOSIS — M54.2 NECK PAIN: Primary | ICD-10-CM

## 2025-05-02 DIAGNOSIS — Z99.2 STAGE 5 CHRONIC KIDNEY DISEASE ON CHRONIC DIALYSIS (HCC): ICD-10-CM

## 2025-05-02 LAB
ALBUMIN SERPL-MCNC: 4 G/DL (ref 3.5–5.2)
ALP SERPL-CCNC: 71 U/L (ref 40–129)
ALT SERPL-CCNC: 9 U/L (ref 0–40)
ANION GAP SERPL CALCULATED.3IONS-SCNC: 15 MMOL/L (ref 7–16)
AST SERPL-CCNC: 16 U/L (ref 0–39)
BASOPHILS # BLD: 0.07 K/UL (ref 0–0.2)
BASOPHILS NFR BLD: 1 % (ref 0–2)
BILIRUB SERPL-MCNC: 0.3 MG/DL (ref 0–1.2)
BNP SERPL-MCNC: ABNORMAL PG/ML (ref 0–450)
BUN SERPL-MCNC: 20 MG/DL (ref 6–23)
CALCIUM SERPL-MCNC: 9.3 MG/DL (ref 8.6–10.2)
CHLORIDE SERPL-SCNC: 92 MMOL/L (ref 98–107)
CO2 SERPL-SCNC: 30 MMOL/L (ref 22–29)
CREAT SERPL-MCNC: 5.1 MG/DL (ref 0.7–1.2)
EKG ATRIAL RATE: 60 BPM
EKG P AXIS: 63 DEGREES
EKG P-R INTERVAL: 222 MS
EKG Q-T INTERVAL: 518 MS
EKG QRS DURATION: 64 MS
EKG QTC CALCULATION (BAZETT): 518 MS
EKG R AXIS: 45 DEGREES
EKG T AXIS: 52 DEGREES
EKG VENTRICULAR RATE: 60 BPM
EOSINOPHIL # BLD: 0.14 K/UL (ref 0.05–0.5)
EOSINOPHILS RELATIVE PERCENT: 1 % (ref 0–6)
ERYTHROCYTE [DISTWIDTH] IN BLOOD BY AUTOMATED COUNT: 15.6 % (ref 11.5–15)
GFR, ESTIMATED: 11 ML/MIN/1.73M2
GLUCOSE SERPL-MCNC: 182 MG/DL (ref 74–99)
HCT VFR BLD AUTO: 36.2 % (ref 37–54)
HGB BLD-MCNC: 10.8 G/DL (ref 12.5–16.5)
IMM GRANULOCYTES # BLD AUTO: 0.06 K/UL (ref 0–0.58)
IMM GRANULOCYTES NFR BLD: 1 % (ref 0–5)
LYMPHOCYTES NFR BLD: 1.77 K/UL (ref 1.5–4)
LYMPHOCYTES RELATIVE PERCENT: 17 % (ref 20–42)
MAGNESIUM SERPL-MCNC: 2.6 MG/DL (ref 1.6–2.6)
MCH RBC QN AUTO: 30.6 PG (ref 26–35)
MCHC RBC AUTO-ENTMCNC: 29.8 G/DL (ref 32–34.5)
MCV RBC AUTO: 102.5 FL (ref 80–99.9)
MONOCYTES NFR BLD: 0.93 K/UL (ref 0.1–0.95)
MONOCYTES NFR BLD: 9 % (ref 2–12)
NEUTROPHILS NFR BLD: 72 % (ref 43–80)
NEUTS SEG NFR BLD: 7.71 K/UL (ref 1.8–7.3)
PLATELET # BLD AUTO: 268 K/UL (ref 130–450)
PMV BLD AUTO: 10 FL (ref 7–12)
POTASSIUM SERPL-SCNC: 4.3 MMOL/L (ref 3.5–5)
PROT SERPL-MCNC: 7 G/DL (ref 6.4–8.3)
RBC # BLD AUTO: 3.53 M/UL (ref 3.8–5.8)
SODIUM SERPL-SCNC: 137 MMOL/L (ref 132–146)
TROPONIN I SERPL HS-MCNC: 168 NG/L (ref 0–22)
TROPONIN I SERPL HS-MCNC: 179 NG/L (ref 0–22)
WBC OTHER # BLD: 10.7 K/UL (ref 4.5–11.5)

## 2025-05-02 PROCEDURE — 83735 ASSAY OF MAGNESIUM: CPT

## 2025-05-02 PROCEDURE — 2580000003 HC RX 258

## 2025-05-02 PROCEDURE — 85025 COMPLETE CBC W/AUTO DIFF WBC: CPT

## 2025-05-02 PROCEDURE — 70450 CT HEAD/BRAIN W/O DYE: CPT

## 2025-05-02 PROCEDURE — 99285 EMERGENCY DEPT VISIT HI MDM: CPT

## 2025-05-02 PROCEDURE — 80053 COMPREHEN METABOLIC PANEL: CPT

## 2025-05-02 PROCEDURE — 71045 X-RAY EXAM CHEST 1 VIEW: CPT

## 2025-05-02 PROCEDURE — 72125 CT NECK SPINE W/O DYE: CPT

## 2025-05-02 PROCEDURE — 6370000000 HC RX 637 (ALT 250 FOR IP)

## 2025-05-02 PROCEDURE — 93005 ELECTROCARDIOGRAM TRACING: CPT

## 2025-05-02 PROCEDURE — 93010 ELECTROCARDIOGRAM REPORT: CPT | Performed by: INTERNAL MEDICINE

## 2025-05-02 PROCEDURE — 84484 ASSAY OF TROPONIN QUANT: CPT

## 2025-05-02 PROCEDURE — 83880 ASSAY OF NATRIURETIC PEPTIDE: CPT

## 2025-05-02 RX ORDER — 0.9 % SODIUM CHLORIDE 0.9 %
1000 INTRAVENOUS SOLUTION INTRAVENOUS ONCE
Status: COMPLETED | OUTPATIENT
Start: 2025-05-02 | End: 2025-05-02

## 2025-05-02 RX ORDER — MIDODRINE HYDROCHLORIDE 5 MG/1
5 TABLET ORAL ONCE
Status: COMPLETED | OUTPATIENT
Start: 2025-05-02 | End: 2025-05-02

## 2025-05-02 RX ADMIN — MIDODRINE HYDROCHLORIDE 5 MG: 5 TABLET ORAL at 11:22

## 2025-05-02 RX ADMIN — SODIUM CHLORIDE 1000 ML: 0.9 INJECTION, SOLUTION INTRAVENOUS at 11:21

## 2025-05-02 ASSESSMENT — LIFESTYLE VARIABLES
HOW OFTEN DO YOU HAVE A DRINK CONTAINING ALCOHOL: 2-3 TIMES A WEEK
HOW MANY STANDARD DRINKS CONTAINING ALCOHOL DO YOU HAVE ON A TYPICAL DAY: 1 OR 2

## 2025-05-02 ASSESSMENT — PAIN - FUNCTIONAL ASSESSMENT: PAIN_FUNCTIONAL_ASSESSMENT: NONE - DENIES PAIN

## 2025-05-02 NOTE — DISCHARGE INSTRUCTIONS
Please follow-up with primary care physician, return to the emergency department for any worsening symptoms or concerns including confusion, chest pain, shortness of breath, headaches, blurry vision, or other general concerns.    XR CHEST PORTABLE   Final Result   No evidence of an acute intrathoracic process.         CT HEAD WO CONTRAST   Final Result   No evidence of an acute or subacute intracranial abnormality.         CT CSpine W/O Contrast   Final Result   1. No evidence of acute cervical spine fracture or posttraumatic subluxation.

## 2025-05-02 NOTE — ED PROVIDER NOTES
[facility] EMERGENCY DEPARTMENT  EMERGENCY DEPARTMENT ENCOUNTER        Pt Name: Cleveland Thompson  MRN: [id number]  Birthdate 1945  Date of evaluation: 5/2/2025  Provider: David Han DO  PCP: Rajat Loera DO  Note Started: 10:16 AM EDT 5/2/25    CHIEF COMPLAINT       Chief Complaint   Patient presents with    Neck Pain     Pt complains of chromic neck pain, worsening yesterday after HD treatment       HISTORY OF PRESENT ILLNESS: 1 or more Elements   History From: Patient and family member    Cleveland Thompson is a 79 y.o. male with a PMHx of hypertension, end-stage renal disease on dialysis, DVT, on Eliquis, sinus node dysfunction, pacemaker, AAA and thoracic aortic aneurysm with repair of both who presents with neck pain.  Patient received dialysis yesterday, got home needed help out of the car due to weakness and lightheadedness, was able to drink some fluids and felt much better and was able to walk around the house, patient does live at home, but during the time at home he was complaining of paraspinal neck pain per the family member.  Patient has a previous history of cervical spine fracture with no surgical intervention and was in a c-collar for a while.  This C-spine injury was approximately 1 year ago.  Patient is alert and oriented x 2 at baseline confirmed by the family member to person and place.  He denies any headaches, blurry vision, chest pain, shortness of breath, abdominal pain, nausea, vomiting, diarrhea, constipation, urinary symptoms, hematuria, hematochezia, melena.  Patient is a former smoker, quit in the 90s, admits to occasional alcohol use which includes 1 screw ball whiskey and orange liquor and a shot glass approximately 3 times a week, denies any other illicit drug use.      Nursing Notes were all reviewed and agreed with or any disagreements were addressed in the HPI.      REVIEW OF EXTERNAL NOTES :       PDMP Monitoring:    Last PDMP David as  presents for neck pain and weakness.  Upon my initial evaluation the patient is sitting up comfortably in the hospital bed, no acute distress, nontoxic-appearing, with soft blood pressure of 75/40, heart rate of 57, normal respirations, nonhypoxic on room air, afebrile.  Patient was treated with a liter of fluids and midodrine.  Differential diagnosis includes but is not limited to ACS, intracranial bleed or other abnormality, C-spine injury, electrolyte abnormality, to name a few.  Patient has a history of end-stage renal disease on dialysis.  ACS was considered, EKG shows no evidence of acute ischemia, delta troponins negative.  BNP 20,000 but improved from 5 months ago.  CMP shows stable electrolytes, creatinine of 5.1, GFR of 11, improved from prior readings, stable hepatic function, magnesium 2.6.  CBC shows no evidence of acute anemia or leukocytosis.  CT head and C-spine showed no acute abnormality.  Chest x-ray shows no evidence of pneumonia or pneumothorax.  Patient was reevaluated, blood pressure is improving, patient remains asymptomatic, patient and family member updated on all laboratory results and imaging findings as well as management the emergency department, they are understanding and agreeable with plan with discharge home pending ambulation.  Patient ambulated with no issues or complaints.  Patient remains hemodynamically stable, no focal neurological deficits, patient was discharged home in stable condition and given strict return precautions.    CONSULTS: (Who and What was discussed)  None          FINAL IMPRESSION      1. Neck pain    2. Stage 5 chronic kidney disease on chronic dialysis (HCC)          DISPOSITION/PLAN     DISPOSITION Decision To Discharge 05/02/2025 02:55:55 PM    DISPOSITION  Disposition: Discharge to home  Patient condition is stable    5/2/25, 10:16 AM EDT.    David Han, PGY-1  Emergency Medicine    PATIENT REFERRED TO:  Rajat Loera, DO  [id number]

## 2025-05-16 DIAGNOSIS — N18.6 END STAGE RENAL DISEASE (HCC): ICD-10-CM

## 2025-05-16 DIAGNOSIS — D63.1 ANEMIA OF CHRONIC RENAL FAILURE, STAGE 5 (HCC): ICD-10-CM

## 2025-05-16 DIAGNOSIS — Z95.0 PACEMAKER: ICD-10-CM

## 2025-05-16 DIAGNOSIS — R41.89 COGNITIVE CHANGES: ICD-10-CM

## 2025-05-16 DIAGNOSIS — N18.5 ANEMIA OF CHRONIC RENAL FAILURE, STAGE 5 (HCC): ICD-10-CM

## 2025-05-16 LAB
ALBUMIN: 4 G/DL (ref 3.5–5.2)
ALP BLD-CCNC: 78 U/L (ref 40–129)
ALT SERPL-CCNC: 12 U/L (ref 0–50)
ANION GAP SERPL CALCULATED.3IONS-SCNC: 13 MMOL/L (ref 7–16)
AST SERPL-CCNC: 25 U/L (ref 0–50)
BASOPHILS ABSOLUTE: 0.07 K/UL (ref 0–0.2)
BASOPHILS RELATIVE PERCENT: 1 % (ref 0–2)
BILIRUB SERPL-MCNC: 0.4 MG/DL (ref 0–1.2)
BUN BLDV-MCNC: 17 MG/DL (ref 8–23)
CALCIUM SERPL-MCNC: 9.1 MG/DL (ref 8.8–10.2)
CHLORIDE BLD-SCNC: 94 MMOL/L (ref 98–107)
CHOLESTEROL, TOTAL: 129 MG/DL
CO2: 30 MMOL/L (ref 22–29)
CREAT SERPL-MCNC: 4.6 MG/DL (ref 0.7–1.2)
EOSINOPHILS ABSOLUTE: 0.2 K/UL (ref 0.05–0.5)
EOSINOPHILS RELATIVE PERCENT: 2 % (ref 0–6)
GFR, ESTIMATED: 12 ML/MIN/1.73M2
GLUCOSE BLD-MCNC: 90 MG/DL (ref 74–99)
HCT VFR BLD CALC: 34.7 % (ref 37–54)
HDLC SERPL-MCNC: 37 MG/DL
HEMOGLOBIN: 10.7 G/DL (ref 12.5–16.5)
IMMATURE GRANULOCYTES %: 1 % (ref 0–5)
IMMATURE GRANULOCYTES ABSOLUTE: 0.05 K/UL (ref 0–0.58)
LDL CHOLESTEROL: 66 MG/DL
LYMPHOCYTES ABSOLUTE: 2.68 K/UL (ref 1.5–4)
LYMPHOCYTES RELATIVE PERCENT: 28 % (ref 20–42)
MCH RBC QN AUTO: 31.5 PG (ref 26–35)
MCHC RBC AUTO-ENTMCNC: 30.8 G/DL (ref 32–34.5)
MCV RBC AUTO: 102.1 FL (ref 80–99.9)
MONOCYTES ABSOLUTE: 1.1 K/UL (ref 0.1–0.95)
MONOCYTES RELATIVE PERCENT: 11 % (ref 2–12)
NEUTROPHILS ABSOLUTE: 5.58 K/UL (ref 1.8–7.3)
NEUTROPHILS RELATIVE PERCENT: 58 % (ref 43–80)
PDW BLD-RTO: 16.5 % (ref 11.5–15)
PLATELET # BLD: 175 K/UL (ref 130–450)
PMV BLD AUTO: 10.8 FL (ref 7–12)
POTASSIUM SERPL-SCNC: 4.8 MMOL/L (ref 3.5–5.1)
RBC # BLD: 3.4 M/UL (ref 3.8–5.8)
SODIUM BLD-SCNC: 137 MMOL/L (ref 136–145)
THYROXINE (T4): 6.6 UG/DL (ref 4.5–11.7)
TOTAL PROTEIN: 6.9 G/DL (ref 6.4–8.3)
TRIGL SERPL-MCNC: 130 MG/DL
TSH SERPL DL<=0.05 MIU/L-ACNC: 2.8 UIU/ML (ref 0.27–4.2)
VLDLC SERPL CALC-MCNC: 26 MG/DL
WBC # BLD: 9.7 K/UL (ref 4.5–11.5)

## 2025-05-19 ENCOUNTER — OFFICE VISIT (OUTPATIENT)
Dept: PRIMARY CARE CLINIC | Age: 80
End: 2025-05-19
Payer: MEDICARE

## 2025-05-19 VITALS
TEMPERATURE: 97.9 F | HEART RATE: 60 BPM | DIASTOLIC BLOOD PRESSURE: 60 MMHG | BODY MASS INDEX: 24.75 KG/M2 | HEIGHT: 66 IN | SYSTOLIC BLOOD PRESSURE: 110 MMHG | WEIGHT: 154 LBS

## 2025-05-19 DIAGNOSIS — Z98.890 HISTORY OF AAA (ABDOMINAL AORTIC ANEURYSM) REPAIR: ICD-10-CM

## 2025-05-19 DIAGNOSIS — R41.89 COGNITIVE CHANGES: ICD-10-CM

## 2025-05-19 DIAGNOSIS — E79.0 HYPERURICEMIA: ICD-10-CM

## 2025-05-19 DIAGNOSIS — N18.6 END STAGE RENAL DISEASE (HCC): ICD-10-CM

## 2025-05-19 DIAGNOSIS — I95.1 ORTHOSTATIC HYPOTENSION: Primary | ICD-10-CM

## 2025-05-19 DIAGNOSIS — Z95.0 PACEMAKER: ICD-10-CM

## 2025-05-19 DIAGNOSIS — R53.83 OTHER FATIGUE: ICD-10-CM

## 2025-05-19 DIAGNOSIS — I72.3 ILIAC ARTERY ANEURYSM, BILATERAL: ICD-10-CM

## 2025-05-19 DIAGNOSIS — Z86.718 HISTORY OF DVT (DEEP VEIN THROMBOSIS): ICD-10-CM

## 2025-05-19 PROCEDURE — 1123F ACP DISCUSS/DSCN MKR DOCD: CPT | Performed by: FAMILY MEDICINE

## 2025-05-19 PROCEDURE — 1159F MED LIST DOCD IN RCRD: CPT | Performed by: FAMILY MEDICINE

## 2025-05-19 PROCEDURE — 99214 OFFICE O/P EST MOD 30 MIN: CPT | Performed by: FAMILY MEDICINE

## 2025-05-19 RX ORDER — ALLOPURINOL 100 MG/1
100 TABLET ORAL DAILY
Qty: 90 TABLET | Refills: 0 | Status: SHIPPED | OUTPATIENT
Start: 2025-05-19

## 2025-05-19 ASSESSMENT — ENCOUNTER SYMPTOMS
EYES NEGATIVE: 1
RESPIRATORY NEGATIVE: 1
ALLERGIC/IMMUNOLOGIC NEGATIVE: 1
GASTROINTESTINAL NEGATIVE: 1

## 2025-05-19 NOTE — PROGRESS NOTES
25     Cleveland Thompson    : 1945 Sex: male   Age: 79 y.o.      Chief Complaint   Patient presents with    Follow-Up from Hospital       Prior to Admission medications    Medication Sig Start Date End Date Taking? Authorizing Provider   midodrine (PROAMATINE) 5 MG tablet TAKE ONE TABLET BY MOUTH IN THE MORNING AND AT BEDTIME. TAKES ONE TABLET (5MG) TWICE DAILY ON DIALYSIS DAYS. 25  Yes Rajat Loera DO   Apoaequorin (PREVAGEN PO) Take by mouth   Yes Becky Munoz MD   mirtazapine (REMERON) 7.5 MG tablet Take 1 tablet by mouth nightly 25  Yes Rajat Loera DO   rosuvastatin (CRESTOR) 10 MG tablet TAKE ONE TABLET BY MOUTH EVERY EVENING 22  Yes Becky Munoz MD   apixaban (ELIQUIS) 2.5 MG TABS tablet Take 1 tablet by mouth 2 times daily   Yes Becky Munoz MD   Probiotic Product (PROBIOTIC BLEND PO) Take by mouth   Yes Becky Munoz MD   allopurinol (ZYLOPRIM) 100 MG tablet Take 1 tablet by mouth daily   Yes Becky Munoz MD          HPI: Cleveland seen today medical follow-up on issues of hypotension cardiac pacemaker peripheral vascular disease and end-stage renal disease and cognitive decline.  Overall medically managing fairly well at home with the assistance of his wife.  All medications reviewed and these will be maintained as prescribed.          Review of Systems   Constitutional: Negative.    HENT: Negative.     Eyes: Negative.    Respiratory: Negative.     Gastrointestinal: Negative.    Endocrine: Negative.    Genitourinary: Negative.    Musculoskeletal: Negative.    Skin: Negative.    Allergic/Immunologic: Negative.    Neurological: Negative.    Hematological: Negative.    Psychiatric/Behavioral: Negative.        Today systems review stable meds as prescribed.        Current Outpatient Medications:     midodrine (PROAMATINE) 5 MG tablet, TAKE ONE TABLET BY MOUTH IN THE MORNING AND AT BEDTIME. TAKES ONE TABLET (5MG) TWICE DAILY ON DIALYSIS

## 2025-05-28 RX ORDER — MIDODRINE HYDROCHLORIDE 5 MG/1
TABLET ORAL
Qty: 60 TABLET | Refills: 0 | Status: SHIPPED | OUTPATIENT
Start: 2025-05-28

## 2025-06-09 RX ORDER — APIXABAN 2.5 MG/1
2.5 TABLET, FILM COATED ORAL 2 TIMES DAILY
Qty: 120 TABLET | Refills: 0 | Status: SHIPPED | OUTPATIENT
Start: 2025-06-09

## 2025-06-25 DIAGNOSIS — R41.89 COGNITIVE CHANGES: ICD-10-CM

## 2025-06-25 DIAGNOSIS — N18.6 END STAGE RENAL DISEASE (HCC): ICD-10-CM

## 2025-06-25 DIAGNOSIS — E79.0 HYPERURICEMIA: ICD-10-CM

## 2025-06-25 DIAGNOSIS — R53.83 OTHER FATIGUE: ICD-10-CM

## 2025-06-25 DIAGNOSIS — I95.1 ORTHOSTATIC HYPOTENSION: ICD-10-CM

## 2025-06-25 DIAGNOSIS — Z95.0 PACEMAKER: ICD-10-CM

## 2025-06-25 LAB
ALBUMIN: 3.8 G/DL (ref 3.5–5.2)
ALP BLD-CCNC: 74 U/L (ref 40–129)
ALT SERPL-CCNC: <5 U/L (ref 0–50)
ANION GAP SERPL CALCULATED.3IONS-SCNC: 16 MMOL/L (ref 7–16)
AST SERPL-CCNC: 29 U/L (ref 0–50)
BASOPHILS ABSOLUTE: 0.08 K/UL (ref 0–0.2)
BASOPHILS RELATIVE PERCENT: 1 % (ref 0–2)
BILIRUB SERPL-MCNC: 0.4 MG/DL (ref 0–1.2)
BUN BLDV-MCNC: 23 MG/DL (ref 8–23)
CALCIUM SERPL-MCNC: 9.3 MG/DL (ref 8.8–10.2)
CHLORIDE BLD-SCNC: 93 MMOL/L (ref 98–107)
CHOLESTEROL, TOTAL: 134 MG/DL
CO2: 27 MMOL/L (ref 22–29)
CREAT SERPL-MCNC: 5.4 MG/DL (ref 0.7–1.2)
EOSINOPHILS ABSOLUTE: 0.23 K/UL (ref 0.05–0.5)
EOSINOPHILS RELATIVE PERCENT: 2 % (ref 0–6)
FOLATE: 8.3 NG/ML (ref 4.6–34.8)
GFR, ESTIMATED: 10 ML/MIN/1.73M2
GLUCOSE BLD-MCNC: 88 MG/DL (ref 74–99)
HCT VFR BLD CALC: 36.8 % (ref 37–54)
HDLC SERPL-MCNC: 35 MG/DL
HEMOGLOBIN: 11.1 G/DL (ref 12.5–16.5)
IMMATURE GRANULOCYTES %: 1 % (ref 0–5)
IMMATURE GRANULOCYTES ABSOLUTE: 0.1 K/UL (ref 0–0.58)
LDL CHOLESTEROL: 73 MG/DL
LYMPHOCYTES ABSOLUTE: 2.56 K/UL (ref 1.5–4)
LYMPHOCYTES RELATIVE PERCENT: 19 % (ref 20–42)
MCH RBC QN AUTO: 30.7 PG (ref 26–35)
MCHC RBC AUTO-ENTMCNC: 30.2 G/DL (ref 32–34.5)
MCV RBC AUTO: 101.7 FL (ref 80–99.9)
MONOCYTES ABSOLUTE: 1.13 K/UL (ref 0.1–0.95)
MONOCYTES RELATIVE PERCENT: 9 % (ref 2–12)
NEUTROPHILS ABSOLUTE: 9.12 K/UL (ref 1.8–7.3)
NEUTROPHILS RELATIVE PERCENT: 69 % (ref 43–80)
PDW BLD-RTO: 15.7 % (ref 11.5–15)
PLATELET # BLD: 249 K/UL (ref 130–450)
PMV BLD AUTO: 10.3 FL (ref 7–12)
POTASSIUM SERPL-SCNC: 4.1 MMOL/L (ref 3.5–5.1)
RBC # BLD: 3.62 M/UL (ref 3.8–5.8)
SODIUM BLD-SCNC: 136 MMOL/L (ref 136–145)
THYROXINE (T4): 7.1 UG/DL (ref 4.5–11.7)
TOTAL PROTEIN: 7.1 G/DL (ref 6.4–8.3)
TRIGL SERPL-MCNC: 129 MG/DL
TSH SERPL DL<=0.05 MIU/L-ACNC: 1.55 UIU/ML (ref 0.27–4.2)
URIC ACID: 3.2 MG/DL (ref 3.4–7)
VITAMIN B-12: 520 PG/ML (ref 232–1245)
VLDLC SERPL CALC-MCNC: 26 MG/DL
WBC # BLD: 13.2 K/UL (ref 4.5–11.5)

## 2025-06-29 RX ORDER — MIDODRINE HYDROCHLORIDE 5 MG/1
TABLET ORAL
Qty: 60 TABLET | Refills: 0 | Status: SHIPPED | OUTPATIENT
Start: 2025-06-29

## 2025-06-30 ENCOUNTER — OFFICE VISIT (OUTPATIENT)
Dept: PRIMARY CARE CLINIC | Age: 80
End: 2025-06-30
Payer: MEDICARE

## 2025-06-30 VITALS
SYSTOLIC BLOOD PRESSURE: 120 MMHG | HEART RATE: 59 BPM | WEIGHT: 158 LBS | DIASTOLIC BLOOD PRESSURE: 64 MMHG | BODY MASS INDEX: 25.5 KG/M2 | RESPIRATION RATE: 16 BRPM | TEMPERATURE: 97.8 F | OXYGEN SATURATION: 99 %

## 2025-06-30 DIAGNOSIS — I95.1 ORTHOSTATIC HYPOTENSION: Primary | ICD-10-CM

## 2025-06-30 DIAGNOSIS — F01.B11 MODERATE VASCULAR DEMENTIA WITH AGITATION (HCC): ICD-10-CM

## 2025-06-30 DIAGNOSIS — Z95.0 PACEMAKER: ICD-10-CM

## 2025-06-30 DIAGNOSIS — Z98.890 HISTORY OF AAA (ABDOMINAL AORTIC ANEURYSM) REPAIR: ICD-10-CM

## 2025-06-30 DIAGNOSIS — R41.89 COGNITIVE CHANGES: ICD-10-CM

## 2025-06-30 PROCEDURE — 1159F MED LIST DOCD IN RCRD: CPT | Performed by: FAMILY MEDICINE

## 2025-06-30 PROCEDURE — 99214 OFFICE O/P EST MOD 30 MIN: CPT | Performed by: FAMILY MEDICINE

## 2025-06-30 PROCEDURE — 1123F ACP DISCUSS/DSCN MKR DOCD: CPT | Performed by: FAMILY MEDICINE

## 2025-06-30 RX ORDER — ROSUVASTATIN CALCIUM 10 MG/1
10 TABLET, COATED ORAL NIGHTLY
Qty: 90 TABLET | Refills: 1 | Status: SHIPPED | OUTPATIENT
Start: 2025-06-30

## 2025-06-30 RX ORDER — MIDODRINE HYDROCHLORIDE 5 MG/1
5 TABLET ORAL 2 TIMES DAILY
Qty: 180 TABLET | Refills: 1 | Status: SHIPPED | OUTPATIENT
Start: 2025-06-30

## 2025-06-30 ASSESSMENT — ENCOUNTER SYMPTOMS
ALLERGIC/IMMUNOLOGIC NEGATIVE: 1
EYES NEGATIVE: 1
RESPIRATORY NEGATIVE: 1
GASTROINTESTINAL NEGATIVE: 1

## 2025-06-30 NOTE — PROGRESS NOTES
25     Cleveland Thompson    : 1945 Sex: male   Age: 79 y.o.      Chief Complaint   Patient presents with    Hypotension    Discuss Labs       Prior to Admission medications    Medication Sig Start Date End Date Taking? Authorizing Provider   apixaban (ELIQUIS) 2.5 MG TABS tablet Take 1 tablet by mouth 2 times daily 25  Yes Rajat Loera DO   rosuvastatin (CRESTOR) 10 MG tablet Take 1 tablet by mouth nightly 25  Yes Rajat Loera DO   midodrine (PROAMATINE) 5 MG tablet Take 1 tablet by mouth in the morning and at bedtime 25  Yes Rajat Loera DO   allopurinol (ZYLOPRIM) 100 MG tablet Take 1 tablet by mouth daily 25  Yes Rajat Loera DO   Apoaequorin (PREVAGEN PO) Take by mouth   Yes ProviderBecky MD   mirtazapine (REMERON) 7.5 MG tablet Take 1 tablet by mouth nightly 25  Yes Rajat Loera, DO   Probiotic Product (PROBIOTIC BLEND PO) Take by mouth   Yes Provider, MD Becky          HPI: Patient evaluated today orthostatic hypotension cardiac pacemaker progressive dementia peripheral vascular disease.  Overall medically managing well at this time.  Has the assistance of his wife and seems to be doing quite well at this time.  Dialysis 3 days a week.          Review of Systems   Constitutional: Negative.    HENT: Negative.     Eyes: Negative.    Respiratory: Negative.     Gastrointestinal: Negative.    Endocrine: Negative.    Genitourinary: Negative.    Musculoskeletal: Negative.    Skin: Negative.    Allergic/Immunologic: Negative.    Neurological: Negative.    Hematological: Negative.    Psychiatric/Behavioral: Negative.        Today systems review stable meds as prescribed.        Current Outpatient Medications:     apixaban (ELIQUIS) 2.5 MG TABS tablet, Take 1 tablet by mouth 2 times daily, Disp: 180 tablet, Rfl: 1    rosuvastatin (CRESTOR) 10 MG tablet, Take 1 tablet by mouth nightly, Disp: 90 tablet, Rfl: 1    midodrine (PROAMATINE) 5 MG

## 2025-07-21 PROCEDURE — NBSRV REMOTE CARDIAC DEVICE CHECK (MURJ): Performed by: INTERNAL MEDICINE

## 2025-08-08 RX ORDER — ALLOPURINOL 100 MG/1
100 TABLET ORAL DAILY
Qty: 90 TABLET | Refills: 0 | Status: SHIPPED | OUTPATIENT
Start: 2025-08-08

## (undated) DEVICE — INTRODUCER LD L13CM OD6FR SPLITTABLE DIL W/ J TIP GWIRE SYR

## (undated) DEVICE — SOLUTION IRRIG 500ML 0.9% SOD CHLO USP POUR PLAS BTL

## (undated) DEVICE — AV FISTULA: Brand: MEDLINE INDUSTRIES, INC.

## (undated) DEVICE — SLING ARM XL L20IN D75IN WHT POLY MESH ENVELOP MTL SIDE

## (undated) DEVICE — GEL US 20GM NONIRRITATING OVERWRAPPED FILE PCH TRNSMIT

## (undated) DEVICE — SUTURE VCRL SZ 4-0 L27IN ABSRB UD L17MM RB-1 1/2 CIR J214H

## (undated) DEVICE — LOOP VES W13MM THK09MM MINI RED SIL FLD REPELLENT

## (undated) DEVICE — PAD, DEFIB, ADULT, RADIOTRAN, PHYSIO, LO: Brand: MEDLINE

## (undated) DEVICE — SYSTEM CATH 20GA L1IN OD1.0668-1.143MM ID0.7874-0.8636MM 383516

## (undated) DEVICE — CLAMP INSERT: Brand: STEALTH® CLAMP INSERT

## (undated) DEVICE — SYRINGE MED 10ML LUERLOCK TIP W/O SFTY DISP

## (undated) DEVICE — ELECTRODE PT RET AD L9FT HI MOIST COND ADH HYDRGEL CORDED

## (undated) DEVICE — STOCKINETTE,SINGLE PLY,4X48,STERILE: Brand: MEDLINE

## (undated) DEVICE — GOWN,SIRUS,FABRNF,L,20/CS: Brand: MEDLINE

## (undated) DEVICE — SUTURE PROL SZ 7-0 L24IN NONABSORBABLE BLU L9.3MM BV-1 3/8 M8702

## (undated) DEVICE — NEPTUNE E-SEP SMOKE EVACUATION PENCIL, COATED, 70MM BLADE, PUSH BUTTON SWITCH: Brand: NEPTUNE E-SEP

## (undated) DEVICE — GLOVE SURG SZ 7.5 L11.73IN FNGR THK9.8MIL STRW LTX POLYMER

## (undated) DEVICE — GLOVE ORANGE PI 7 1/2   MSG9075

## (undated) DEVICE — RADIFOCUS GLIDEWIRE: Brand: GLIDEWIRE

## (undated) DEVICE — CABLE PACE L12FT ALGTR CLP DISP FOR PACE SYS ANALZR MERLIN

## (undated) DEVICE — AGENT HEMOSTATIC SURGIFLOW MATRIX KIT W/THROMBIN

## (undated) DEVICE — MICROPUNCTURE INTRODUCER SET SILHOUETTE TRANSITIONLESS PUSH-PLUS DESIGN - STIFFENED CANNULA WITH STAINLESS STEEL WIRE GUIDE: Brand: MICROPUNCTURE

## (undated) DEVICE — 18 GA N.G. KIT, 10 PACK: Brand: SITE-RITE